# Patient Record
Sex: FEMALE | Race: BLACK OR AFRICAN AMERICAN | Employment: PART TIME | URBAN - METROPOLITAN AREA
[De-identification: names, ages, dates, MRNs, and addresses within clinical notes are randomized per-mention and may not be internally consistent; named-entity substitution may affect disease eponyms.]

---
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
## 2023-01-01 ENCOUNTER — APPOINTMENT (OUTPATIENT)
Dept: CT IMAGING | Facility: CLINIC | Age: 18
End: 2023-01-01
Attending: EMERGENCY MEDICINE
Payer: COMMERCIAL

## 2023-01-01 ENCOUNTER — HOSPITAL ENCOUNTER (EMERGENCY)
Facility: CLINIC | Age: 18
Discharge: HOME | End: 2023-01-01
Attending: EMERGENCY MEDICINE | Admitting: EMERGENCY MEDICINE
Payer: COMMERCIAL

## 2023-01-01 VITALS
TEMPERATURE: 97.8 F | HEART RATE: 68 BPM | DIASTOLIC BLOOD PRESSURE: 58 MMHG | RESPIRATION RATE: 18 BRPM | WEIGHT: 235 LBS | OXYGEN SATURATION: 99 % | BODY MASS INDEX: 34.7 KG/M2 | SYSTOLIC BLOOD PRESSURE: 114 MMHG

## 2023-01-01 DIAGNOSIS — I26.99 ACUTE PULMONARY EMBOLISM WITHOUT ACUTE COR PULMONALE, UNSPECIFIED PULMONARY EMBOLISM TYPE (H): ICD-10-CM

## 2023-01-01 LAB
ANION GAP SERPL CALCULATED.3IONS-SCNC: 11 MMOL/L (ref 7–15)
BASOPHILS # BLD AUTO: 0 10E3/UL (ref 0–0.2)
BASOPHILS NFR BLD AUTO: 0 %
BUN SERPL-MCNC: 11.6 MG/DL (ref 6–20)
CALCIUM SERPL-MCNC: 8.6 MG/DL (ref 8.6–10)
CHLORIDE SERPL-SCNC: 99 MMOL/L (ref 98–107)
CREAT SERPL-MCNC: 0.62 MG/DL (ref 0.51–0.95)
DEPRECATED HCO3 PLAS-SCNC: 26 MMOL/L (ref 22–29)
EOSINOPHIL # BLD AUTO: 0 10E3/UL (ref 0–0.7)
EOSINOPHIL NFR BLD AUTO: 0 %
ERYTHROCYTE [DISTWIDTH] IN BLOOD BY AUTOMATED COUNT: 17.2 % (ref 10–15)
GFR SERPL CREATININE-BSD FRML MDRD: >90 ML/MIN/1.73M2
GLUCOSE SERPL-MCNC: 83 MG/DL (ref 70–99)
HCG SERPL QL: NEGATIVE
HCT VFR BLD AUTO: 33.5 % (ref 35–47)
HGB BLD-MCNC: 9.8 G/DL (ref 11.7–15.7)
HOLD SPECIMEN: NORMAL
IMM GRANULOCYTES # BLD: 0 10E3/UL
IMM GRANULOCYTES NFR BLD: 0 %
LYMPHOCYTES # BLD AUTO: 2.3 10E3/UL (ref 0.8–5.3)
LYMPHOCYTES NFR BLD AUTO: 24 %
MCH RBC QN AUTO: 22.6 PG (ref 26.5–33)
MCHC RBC AUTO-ENTMCNC: 29.3 G/DL (ref 31.5–36.5)
MCV RBC AUTO: 77 FL (ref 78–100)
MONOCYTES # BLD AUTO: 0.6 10E3/UL (ref 0–1.3)
MONOCYTES NFR BLD AUTO: 6 %
NEUTROPHILS # BLD AUTO: 6.6 10E3/UL (ref 1.6–8.3)
NEUTROPHILS NFR BLD AUTO: 70 %
NRBC # BLD AUTO: 0 10E3/UL
NRBC BLD AUTO-RTO: 0 /100
NT-PROBNP SERPL-MCNC: <36 PG/ML (ref 0–450)
PLATELET # BLD AUTO: 422 10E3/UL (ref 150–450)
POTASSIUM SERPL-SCNC: 4.5 MMOL/L (ref 3.4–5.3)
RBC # BLD AUTO: 4.33 10E6/UL (ref 3.8–5.2)
SODIUM SERPL-SCNC: 136 MMOL/L (ref 136–145)
TROPONIN T SERPL HS-MCNC: <6 NG/L
WBC # BLD AUTO: 9.6 10E3/UL (ref 4–11)

## 2023-01-01 PROCEDURE — 96374 THER/PROPH/DIAG INJ IV PUSH: CPT | Mod: 59

## 2023-01-01 PROCEDURE — 99285 EMERGENCY DEPT VISIT HI MDM: CPT | Mod: 25

## 2023-01-01 PROCEDURE — 80048 BASIC METABOLIC PNL TOTAL CA: CPT | Performed by: EMERGENCY MEDICINE

## 2023-01-01 PROCEDURE — 250N000011 HC RX IP 250 OP 636: Performed by: EMERGENCY MEDICINE

## 2023-01-01 PROCEDURE — 36415 COLL VENOUS BLD VENIPUNCTURE: CPT | Performed by: EMERGENCY MEDICINE

## 2023-01-01 PROCEDURE — 250N000009 HC RX 250: Performed by: EMERGENCY MEDICINE

## 2023-01-01 PROCEDURE — 71275 CT ANGIOGRAPHY CHEST: CPT

## 2023-01-01 PROCEDURE — 83880 ASSAY OF NATRIURETIC PEPTIDE: CPT | Performed by: EMERGENCY MEDICINE

## 2023-01-01 PROCEDURE — 93005 ELECTROCARDIOGRAM TRACING: CPT

## 2023-01-01 PROCEDURE — 85025 COMPLETE CBC W/AUTO DIFF WBC: CPT | Performed by: EMERGENCY MEDICINE

## 2023-01-01 PROCEDURE — 84484 ASSAY OF TROPONIN QUANT: CPT | Performed by: EMERGENCY MEDICINE

## 2023-01-01 PROCEDURE — 84703 CHORIONIC GONADOTROPIN ASSAY: CPT | Performed by: EMERGENCY MEDICINE

## 2023-01-01 RX ORDER — IOPAMIDOL 755 MG/ML
120 INJECTION, SOLUTION INTRAVASCULAR ONCE
Status: COMPLETED | OUTPATIENT
Start: 2023-01-01 | End: 2023-01-01

## 2023-01-01 RX ORDER — ONDANSETRON 2 MG/ML
4 INJECTION INTRAMUSCULAR; INTRAVENOUS EVERY 30 MIN PRN
Status: DISCONTINUED | OUTPATIENT
Start: 2023-01-01 | End: 2023-01-01 | Stop reason: HOSPADM

## 2023-01-01 RX ADMIN — SODIUM CHLORIDE 100 ML: 9 INJECTION, SOLUTION INTRAVENOUS at 14:35

## 2023-01-01 RX ADMIN — IOPAMIDOL 85 ML: 755 INJECTION, SOLUTION INTRAVENOUS at 14:34

## 2023-01-01 RX ADMIN — ONDANSETRON HYDROCHLORIDE 4 MG: 2 INJECTION, SOLUTION INTRAMUSCULAR; INTRAVENOUS at 13:32

## 2023-01-01 ASSESSMENT — ENCOUNTER SYMPTOMS
FEVER: 0
SHORTNESS OF BREATH: 1

## 2023-01-01 ASSESSMENT — ACTIVITIES OF DAILY LIVING (ADL)
ADLS_ACUITY_SCORE: 35
ADLS_ACUITY_SCORE: 35
ADLS_ACUITY_SCORE: 33

## 2023-01-01 NOTE — ED NOTES
Rapid Assessment Note    History:   Hector Mazariegos is a 17 year old female who presents with chest pain and shortness of breath following a breast reduction. Three days ago she received a breast reduction at the [facility]. Since then she has been feeling short of breath. Today this shortness of breath worsened and she began to experience chest pain. When she called [facility] about her symptoms earlier today she claimed that [facility] suggested a potential blood clot and directed her to the ED. She explained that she can only take short breaths because full ones are painful. The patient denies fever.     Exam:   General:  Alert, interactive  Cardiovascular:  Well perfused  Lungs:  No respiratory distress, no accessory muscle use  Neuro:  Moving all 4 extremities  Skin:  Warm, dry  Psych:  Normal affect      Plan of Care:   I evaluated the patient and developed an initial plan of care. I discussed this plan and explained that I, or one of my partners, would be returning to complete the evaluation.     Afebrile and hemodynamically stable 17-year-old female status post bilateral breast reduction on December 29 at [facility] in [place].  This was performed as an outpatient.  She has developed shortness of breath and some chest discomfort, contacted her clinic and was directed to the emergency department to rule out pulmonary embolism.  IV, labs, and CT chest PE protocol have been ordered.  Her surgical incisions have not been examined in the intake area.        I, KHRIS CORTES, am serving as a scribe to document services personally performed by Dr. Coombs, based on my observations and the provider's statements to me.    1/1/2023  [employer]    Portions of this medical record were completed by a scribe. UPON MY REVIEW AND AUTHENTICATION BY ELECTRONIC SIGNATURE, this confirms (a) I performed the applicable clinical services, and (b) the record is accurate.        Jg Coombs,  MD  01/01/23 [id number]

## 2023-01-01 NOTE — ED TRIAGE NOTES
Breast reduction on 12/29/2022. Pt reports SOB, chest pain, warmth and worsening pain around incisions that started last night. Also concerned about rash on left shoulder. C/o sharp pain near drainage incisions too. Has been taking oxycodone for pain. Denies fever, N/V/D.

## 2023-01-01 NOTE — ED PROVIDER NOTES
History     Chief Complaint:  Shortness of Breath       HPI   Hector Mazariegos is a 17 year old female s/p breast reduction on 12/29 at [facility] who presents with chest pain and shortness of breath. Three days ago she received a breast reduction at the [facility]. Since then she has been feeling short of breath. Today this shortness of breath worsened and she began to experience chest pain. When she called [facility] about her symptoms earlier today she claimed that [facility] suggested a potential blood clot and directed her to the ED. She explained that she can only take short breaths because full ones are painful. The patient denies fever.     Independent Historian: Yes     Review of External Notes: yes    ROS:  Review of Systems   Constitutional: Negative for fever.   Respiratory: Positive for shortness of breath.    Cardiovascular: Positive for chest pain.   All other systems reviewed and are negative.    Allergies:  No Known Allergies     Medications:    Tylenol  Colace  Motrin  Senokot  Aldactone  Docusate sodium  Roxicodone  Zofran    Past Medical History:    Macromastia  Intoeing  Obesity  Anxiety  Acne    Past Surgical History:    Reduction Breast, Bilateral     Family History:  Mother: Diabetes  Brother: Diabetes      Social History:  The patient presents to the ED with her mother  PCP: No Ref-Primary, Physician     Physical Exam     Patient Vitals for the past 24 hrs:   BP Temp Temp src Pulse Resp SpO2 Weight   01/01/23 1642 114/58 -- -- -- -- 99 % --   01/01/23 1621 124/71 -- -- -- -- 100 % --   01/01/23 1606 -- -- -- -- -- 99 % --   01/01/23 1605 136/61 -- -- 68 -- -- --   01/01/23 1330 104/50 -- -- 67 -- 98 % --   01/01/23 1323 111/45 -- -- -- -- 98 % --   01/01/23 1207 118/72 -- -- -- -- -- --   01/01/23 1206 -- 97.8  F (36.6  C) Temporal 89 18 100 % 106.6 kg (235 lb)        Physical Exam  General: Patient is alert and cooperative.  HENT:  Normal nose, oropharynx. Moist oral mucosa.  Eyes: EOMI. Normal  conjunctiva.  Neck:  Normal range of motion and appearance.   Cardiovascular:  Normal rate, regular rhythm and normal heart sounds.   Pulmonary/Chest:  Effort normal. No wheezing or crackles.  Surgical incisions healing, no sign of infection.   Abdominal: Soft. No distension or tenderness.     Musculoskeletal: Normal range of motion. No edema or tenderness.   Neurological: oriented, normal strength, sensation, and coordination.   Skin: Warm and dry. No rash or bruising.   Psychiatric: Normal mood and affect. Normal behavior and judgement.      Emergency Department Course   ECG:  ECG results from 01/01/23   EKG 12 lead     Value    Systolic Blood Pressure     Diastolic Blood Pressure     Ventricular Rate 70    Atrial Rate 70    NE Interval 144    QRS Duration 80        QTc 432    P Axis 0    R AXIS 46    T Axis 33    Interpretation ECG      Sinus rhythm with sinus arrhythmia  Normal ECG  No previous ECGs available         Imaging:  CT Chest Pulmonary Embolism w Contrast   Final Result   IMPRESSION: The exam is positive for acute appearing pulmonary embolism within several right lower lobe pulmonary artery branches.         Critical Result: Pulmonary embolism      Finding was identified on 1/1/2023 2:54 PM.       Dr. Coombs was contacted by me on 1/1/2023 2:54 PM and verbalized understanding of the critical result.          Report per radiology    Laboratory:  Labs Ordered and Resulted from Time of ED Arrival to Time of ED Departure   CBC WITH PLATELETS AND DIFFERENTIAL - Abnormal       Result Value    WBC Count 9.6      RBC Count 4.33      Hemoglobin 9.8 (*)     Hematocrit 33.5 (*)     MCV 77 (*)     MCH 22.6 (*)     MCHC 29.3 (*)     RDW 17.2 (*)     Platelet Count 422      % Neutrophils 70      % Lymphocytes 24      % Monocytes 6      % Eosinophils 0      % Basophils 0      % Immature Granulocytes 0      NRBCs per 100 WBC 0      Absolute Neutrophils 6.6      Absolute Lymphocytes 2.3      Absolute Monocytes  0.6      Absolute Eosinophils 0.0      Absolute Basophils 0.0      Absolute Immature Granulocytes 0.0      Absolute NRBCs 0.0     BASIC METABOLIC PANEL - Normal    Sodium 136      Potassium 4.5      Chloride 99      Carbon Dioxide (CO2) 26      Anion Gap 11      Urea Nitrogen 11.6      Creatinine 0.62      Calcium 8.6      Glucose 83      GFR Estimate >90     HCG QUALITATIVE PREGNANCY - Normal    hCG Serum Qualitative Negative     TROPONIN T, HIGH SENSITIVITY - Normal    Troponin T, High Sensitivity <6     NT PROBNP INPATIENT - Normal    N terminal Pro BNP Inpatient <36        Emergency Department Course & Assessments:      Interventions:  1332 Zofran 4mg IV    Consultations/Discussion of Management or Tests:  1309 I obtained history and examined the patient.  1454 I spoke with Dr. Shell, radiology, regarding CT results.  1558 I rechecked the patient and explained findings.  1650 I rechecked the patient and spoke with the patient's surgery team, including Dr. Mackey, regarding CT findings.      Disposition:  The patient was discharged to home.     Impression & Plan      Medical Decision Making:  Afebrile, hemodynamically stable 17-year-old female status post breast reduction at [facility] on December 29.  She is presented with complaints of chest discomfort and shortness of breath.  Surgical incisions show no drainage or sign of infection.  Work-ups included a CT chest demonstrating evidence of an acute appearing pulmonary embolism within several right lower lobe pulmonary branches.  Labs are unremarkable.  I did speak with the surgery team at [facility].  From their perspective, they have recommended managing pulmonary embolism with anticoagulation, she will be placed on a course of Eliquis with standard dosing and is advised to follow-up with her clinic in the near future for reevaluation and further management.  She has a normal cardiopulmonary examination including a normal respiratory rate and oxygen  saturations.  Blood pressure and heart rate are also within normal limits.  She is a good candidate for outpatient management with early follow-up.    Diagnosis:    ICD-10-CM    1. Acute pulmonary embolism without acute cor pulmonale, unspecified pulmonary embolism type (H)  I26.99            Discharge Medications:  Discharge Medication List as of 1/1/2023  5:08 PM      START taking these medications    Details   Rivaroxaban ANTICOAGULANT 15 & 20 MG TBPK Starter Therapy Pack Take 15 mg by mouth 2 times daily (with meals) for 21 days, THEN 20 mg daily with food for 9 days., Disp-51 each, R-0, E-Prescribe              Scribe Disclosure:  I, KHRIS CORTES, am serving as a scribe at 3:24 PM on 1/1/2023 to document services personally performed by Jg Coombs MD based on my observations and the provider's statements to me.     I, Cindy Swenson, am serving as a scribe () on 1/1/2023 at 4:22 PM to personally document services performed by Jg Coombs MD based on my observations and the provider's statements to me.      1/1/2023   Jg Coombs MD Isaacson, Brian A, MD  01/23/23 0697

## 2023-01-02 LAB
ATRIAL RATE - MUSE: 70 BPM
DIASTOLIC BLOOD PRESSURE - MUSE: NORMAL MMHG
INTERPRETATION ECG - MUSE: NORMAL
P AXIS - MUSE: 0 DEGREES
PR INTERVAL - MUSE: 144 MS
QRS DURATION - MUSE: 80 MS
QT - MUSE: 400 MS
QTC - MUSE: 432 MS
R AXIS - MUSE: 46 DEGREES
SYSTOLIC BLOOD PRESSURE - MUSE: NORMAL MMHG
T AXIS - MUSE: 33 DEGREES
VENTRICULAR RATE- MUSE: 70 BPM

## 2023-01-02 NOTE — ED NOTES
Call received from pt reporting difficulty filling xarelto prescription d/t insurance.  Spoke with Dr. Joya who gave verbal order to change prescription.  Called [facility] [id number] on [facility], left message to change RX to Eliquis 10 mg BID x 1 week, followed by Eliquis 5 mg BID x 1 week.  Pt called back and instructions relayed to  medication tomorrow AM and call ER back with any issues.

## 2023-01-20 ENCOUNTER — APPOINTMENT (OUTPATIENT)
Dept: URBAN - METROPOLITAN AREA CLINIC 253 | Age: 18
Setting detail: DERMATOLOGY
End: 2023-01-20

## 2023-01-20 VITALS — RESPIRATION RATE: 14 BRPM | WEIGHT: 215 LBS | HEIGHT: 69 IN

## 2023-01-20 DIAGNOSIS — L70.0 ACNE VULGARIS: ICD-10-CM

## 2023-01-20 DIAGNOSIS — L85.3 XEROSIS CUTIS: ICD-10-CM

## 2023-01-20 PROCEDURE — 99213 OFFICE O/P EST LOW 20 MIN: CPT

## 2023-01-20 PROCEDURE — OTHER PRESCRIPTION: OTHER

## 2023-01-20 PROCEDURE — OTHER ADDITIONAL NOTES: OTHER

## 2023-01-20 PROCEDURE — OTHER COUNSELING: OTHER

## 2023-01-20 PROCEDURE — OTHER MIPS QUALITY: OTHER

## 2023-01-20 RX ORDER — SPIRONOLACTONE 50 MG/1
TABLET, FILM COATED ORAL
Qty: 60 | Refills: 1 | Status: ERX | COMMUNITY
Start: 2023-01-20

## 2023-01-20 RX ORDER — CLINDAMYCIN PHOSPHATE 10 MG/ML
1% SOLUTION TOPICAL QAM
Qty: 60 | Refills: 5 | Status: ERX | COMMUNITY
Start: 2023-01-20

## 2023-01-20 RX ORDER — TRETIONIN 1 MG/G
0.1% CREAM TOPICAL QHS
Qty: 45 | Refills: 1 | Status: ERX

## 2023-01-20 ASSESSMENT — LOCATION ZONE DERM: LOCATION ZONE: FACE

## 2023-01-20 ASSESSMENT — LOCATION SIMPLE DESCRIPTION DERM: LOCATION SIMPLE: LEFT CHEEK

## 2023-01-20 ASSESSMENT — LOCATION DETAILED DESCRIPTION DERM: LOCATION DETAILED: LEFT INFERIOR MEDIAL MALAR CHEEK

## 2023-01-20 NOTE — HPI: PIMPLES (ACNE)
What Type Of Note Output Would You Prefer (Optional)?: Standard Output
Is This A New Presentation, Or A Follow-Up?: Follow Up Acne
Additional Comments (Use Complete Sentences): Following up for acne. She has been using the tretinoin and clindamycin for a couple years, it’s gone well but now she’s getting larger ones that stay awhile. She thinks she never tried the Spironolactone as it maybe wasn’t covered by her insurance. She thinks the bad ones are worse when she is stressed, or energy drinks. Not necessarily worse around her period. The larger ones are stubborn.

## 2023-01-20 NOTE — PROCEDURE: ADDITIONAL NOTES
Additional Notes: Call if Spironolactone is expensive again, it should not me\\n\\nReviewed Spironolactone risks and side effects with the patient including but not limited to lightheadedness and dizziness, headaches, breast tenderness, menstrual irregularities and spotting, heart palpitations, muscle fatigue or weakness, and signs of renal or liver toxicity. Told patient to call the clinic and stop taking the medication if she experiences any of these serious side effects. Emphasized she should not become pregnant while taking this medication. All questions were answered regarding this medication.\\n\\nA clinical assistant was present for the exam. Care instructions were explained to the patient in detail. Told patient to call with any concerns or questions. The patient verbalized understanding and agreement of the education provided and the treatment plan. Encouraged patient to schedule a follow up appointment right after visit. At the end of the visit, all questions had been answered and the patient was satisfied with the visit.
Detail Level: Simple
Render Risk Assessment In Note?: no

## 2023-01-20 NOTE — PROCEDURE: COUNSELING
High Dose Vitamin A Counseling: Side effects reviewed, pt to contact office should one occur.
Bactrim Pregnancy And Lactation Text: This medication is Pregnancy Category D and is known to cause fetal risk.  It is also excreted in breast milk.
Winlevi Pregnancy And Lactation Text: This medication is considered safe during pregnancy and breastfeeding.
Detail Level: Generalized
Minocycline Counseling: Patient advised regarding possible photosensitivity and discoloration of the teeth, skin, lips, tongue and gums.  Patient instructed to avoid sunlight, if possible.  When exposed to sunlight, patients should wear protective clothing, sunglasses, and sunscreen.  The patient was instructed to call the office immediately if the following severe adverse effects occur:  hearing changes, easy bruising/bleeding, severe headache, or vision changes.  The patient verbalized understanding of the proper use and possible adverse effects of minocycline.  All of the patient's questions and concerns were addressed.
Topical Clindamycin Counseling: Patient counseled that this medication may cause skin irritation or allergic reactions.  In the event of skin irritation, the patient was advised to reduce the amount of the drug applied or use it less frequently.   The patient verbalized understanding of the proper use and possible adverse effects of clindamycin.  All of the patient's questions and concerns were addressed.
Use Enhanced Medication Counseling?: No
Doxycycline Counseling:  Patient counseled regarding possible photosensitivity and increased risk for sunburn.  Patient instructed to avoid sunlight, if possible.  When exposed to sunlight, patients should wear protective clothing, sunglasses, and sunscreen.  The patient was instructed to call the office immediately if the following severe adverse effects occur:  hearing changes, easy bruising/bleeding, severe headache, or vision changes.  The patient verbalized understanding of the proper use and possible adverse effects of doxycycline.  All of the patient's questions and concerns were addressed.
Winlevi Counseling:  I discussed with the patient the risks of topical clascoterone including but not limited to erythema, scaling, itching, and stinging. Patient voiced their understanding.
Aklief Pregnancy And Lactation Text: It is unknown if this medication is safe to use during pregnancy.  It is unknown if this medication is excreted in breast milk.  Breastfeeding women should use the topical cream on the smallest area of the skin for the shortest time needed while breastfeeding.  Do not apply to nipple and areola.
Birth Control Pills Counseling: Birth Control Pill Counseling: I discussed with the patient the potential side effects of OCPs including but not limited to increased risk of stroke, heart attack, thrombophlebitis, deep venous thrombosis, hepatic adenomas, breast changes, GI upset, headaches, and depression.  The patient verbalized understanding of the proper use and possible adverse effects of OCPs. All of the patient's questions and concerns were addressed.
Patient Specific Counseling (Will Not Stick From Patient To Patient): She states she never started the spironolactone. She states it was to experience. \\nRecommended that she continue with the topical medications.\\nRecommend she try and get the spironolactone again. \\nShe voices liking the clindamycin wipes instead of the lotion.
Detail Level: Zone
Topical Clindamycin Pregnancy And Lactation Text: This medication is Pregnancy Category B and is considered safe during pregnancy. It is unknown if it is excreted in breast milk.
Dapsone Counseling: I discussed with the patient the risks of dapsone including but not limited to hemolytic anemia, agranulocytosis, rashes, methemoglobinemia, kidney failure, peripheral neuropathy, headaches, GI upset, and liver toxicity.  Patients who start dapsone require monitoring including baseline LFTs and weekly CBCs for the first month, then every month thereafter.  The patient verbalized understanding of the proper use and possible adverse effects of dapsone.  All of the patient's questions and concerns were addressed.
Topical Sulfur Applications Pregnancy And Lactation Text: This medication is Pregnancy Category C and has an unknown safety profile during pregnancy. It is unknown if this topical medication is excreted in breast milk.
Tetracycline Pregnancy And Lactation Text: This medication is Pregnancy Category D and not consider safe during pregnancy. It is also excreted in breast milk.
Benzoyl Peroxide Counseling: Patient counseled that medicine may cause skin irritation and bleach clothing.  In the event of skin irritation, the patient was advised to reduce the amount of the drug applied or use it less frequently.   The patient verbalized understanding of the proper use and possible adverse effects of benzoyl peroxide.  All of the patient's questions and concerns were addressed.
Erythromycin Counseling:  I discussed with the patient the risks of erythromycin including but not limited to GI upset, allergic reaction, drug rash, diarrhea, increase in liver enzymes, and yeast infections.
Azelaic Acid Counseling: Patient counseled that medicine may cause skin irritation and to avoid applying near the eyes.  In the event of skin irritation, the patient was advised to reduce the amount of the drug applied or use it less frequently.   The patient verbalized understanding of the proper use and possible adverse effects of azelaic acid.  All of the patient's questions and concerns were addressed.
Topical Retinoid counseling:  Patient advised to apply a pea-sized amount only at bedtime and wait 30 minutes after washing their face before applying.  If too drying, patient may add a non-comedogenic moisturizer. The patient verbalized understanding of the proper use and possible adverse effects of retinoids.  All of the patient's questions and concerns were addressed.
Sarecycline Counseling: Patient advised regarding possible photosensitivity and discoloration of the teeth, skin, lips, tongue and gums.  Patient instructed to avoid sunlight, if possible.  When exposed to sunlight, patients should wear protective clothing, sunglasses, and sunscreen.  The patient was instructed to call the office immediately if the following severe adverse effects occur:  hearing changes, easy bruising/bleeding, severe headache, or vision changes.  The patient verbalized understanding of the proper use and possible adverse effects of sarecycline.  All of the patient's questions and concerns were addressed.
Isotretinoin Counseling: Patient should get monthly blood tests, not donate blood, not drive at night if vision affected, not share medication, and not undergo elective surgery for 6 months after tx completed. Side effects reviewed, pt to contact office should one occur.
Bactrim Counseling:  I discussed with the patient the risks of sulfa antibiotics including but not limited to GI upset, allergic reaction, drug rash, diarrhea, dizziness, photosensitivity, and yeast infections.  Rarely, more serious reactions can occur including but not limited to aplastic anemia, agranulocytosis, methemoglobinemia, blood dyscrasias, liver or kidney failure, lung infiltrates or desquamative/blistering drug rashes.
Isotretinoin Pregnancy And Lactation Text: This medication is Pregnancy Category X and is considered extremely dangerous during pregnancy. It is unknown if it is excreted in breast milk.
Spironolactone Pregnancy And Lactation Text: This medication can cause feminization of the male fetus and should be avoided during pregnancy. The active metabolite is also found in breast milk.
Benzoyl Peroxide Pregnancy And Lactation Text: This medication is Pregnancy Category C. It is unknown if benzoyl peroxide is excreted in breast milk.
Topical Retinoid Pregnancy And Lactation Text: This medication is Pregnancy Category C. It is unknown if this medication is excreted in breast milk.
Erythromycin Pregnancy And Lactation Text: This medication is Pregnancy Category B and is considered safe during pregnancy. It is also excreted in breast milk.
Tetracycline Counseling: Patient counseled regarding possible photosensitivity and increased risk for sunburn.  Patient instructed to avoid sunlight, if possible.  When exposed to sunlight, patients should wear protective clothing, sunglasses, and sunscreen.  The patient was instructed to call the office immediately if the following severe adverse effects occur:  hearing changes, easy bruising/bleeding, severe headache, or vision changes.  The patient verbalized understanding of the proper use and possible adverse effects of tetracycline.  All of the patient's questions and concerns were addressed. Patient understands to avoid pregnancy while on therapy due to potential birth defects.
Tazorac Pregnancy And Lactation Text: This medication is not safe during pregnancy. It is unknown if this medication is excreted in breast milk.
Tazorac Counseling:  Patient advised that medication is irritating and drying.  Patient may need to apply sparingly and wash off after an hour before eventually leaving it on overnight.  The patient verbalized understanding of the proper use and possible adverse effects of tazorac.  All of the patient's questions and concerns were addressed.
High Dose Vitamin A Pregnancy And Lactation Text: High dose vitamin A therapy is contraindicated during pregnancy and breast feeding.
Aklief counseling:  Patient advised to apply a pea-sized amount only at bedtime and wait 30 minutes after washing their face before applying.  If too drying, patient may add a non-comedogenic moisturizer.  The most commonly reported side effects including irritation, redness, scaling, dryness, stinging, burning, itching, and increased risk of sunburn.  The patient verbalized understanding of the proper use and possible adverse effects of retinoids.  All of the patient's questions and concerns were addressed.
Birth Control Pills Pregnancy And Lactation Text: This medication should be avoided if pregnant and for the first 30 days post-partum.
Spironolactone Counseling: Patient advised regarding risks of diarrhea, abdominal pain, hyperkalemia, birth defects (for female patients), liver toxicity and renal toxicity. The patient may need blood work to monitor liver and kidney function and potassium levels while on therapy. The patient verbalized understanding of the proper use and possible adverse effects of spironolactone.  All of the patient's questions and concerns were addressed.
Azithromycin Counseling:  I discussed with the patient the risks of azithromycin including but not limited to GI upset, allergic reaction, drug rash, diarrhea, and yeast infections.
Topical Sulfur Applications Counseling: Topical Sulfur Counseling: Patient counseled that this medication may cause skin irritation or allergic reactions.  In the event of skin irritation, the patient was advised to reduce the amount of the drug applied or use it less frequently.   The patient verbalized understanding of the proper use and possible adverse effects of topical sulfur application.  All of the patient's questions and concerns were addressed.
Azithromycin Pregnancy And Lactation Text: This medication is considered safe during pregnancy and is also secreted in breast milk.
Dapsone Pregnancy And Lactation Text: This medication is Pregnancy Category C and is not considered safe during pregnancy or breast feeding.
Doxycycline Pregnancy And Lactation Text: This medication is Pregnancy Category D and not consider safe during pregnancy. It is also excreted in breast milk but is considered safe for shorter treatment courses.
Patient Specific Counseling (Will Not Stick From Patient To Patient): Declined exam. \\nRecommend OTC moisturizers.
Azelaic Acid Pregnancy And Lactation Text: This medication is considered safe during pregnancy and breast feeding.

## 2023-02-13 ENCOUNTER — OFFICE VISIT (OUTPATIENT)
Dept: URGENT CARE | Facility: URGENT CARE | Age: 18
End: 2023-02-13
Payer: COMMERCIAL

## 2023-02-13 VITALS
DIASTOLIC BLOOD PRESSURE: 67 MMHG | TEMPERATURE: 97.8 F | OXYGEN SATURATION: 100 % | WEIGHT: 245 LBS | BODY MASS INDEX: 36.29 KG/M2 | HEART RATE: 77 BPM | SYSTOLIC BLOOD PRESSURE: 102 MMHG | HEIGHT: 69 IN

## 2023-02-13 DIAGNOSIS — Z86.711 HISTORY OF PULMONARY EMBOLISM: ICD-10-CM

## 2023-02-13 DIAGNOSIS — R06.02 SOB (SHORTNESS OF BREATH): Primary | ICD-10-CM

## 2023-02-13 PROCEDURE — 99207 PR NO BILLABLE SERVICE THIS VISIT: CPT

## 2023-02-13 NOTE — PROGRESS NOTES
SUBJECTIVE:   Hector Mazariegos is a 17 year old female presenting with a chief complaint of   Chief Complaint   Patient presents with     Urgent Care     Shortness of Breath     Pt in clinic to have eval for SOB and sharp right breast pain.  Pt states she is 2 months post op breast reduction.     Breast Problem       She is an established patient of [facility].  Patient is presenting with right breast pain and SOB.  No abdominal pain.  Since yesterday.  Patient has a hx of PE and is off of her blood thinner.          Review of Systems    No past medical history on file.  Family History   Problem Relation Age of Onset     Diabetes Mother      Diabetes Brother      Alzheimer Disease Paternal Grandmother      Current Outpatient Medications   Medication Sig Dispense Refill     acetaminophen 500 MG CAPS Take 2 capsules by mouth every 8 hours as needed For aches, pain, fever (Patient not taking: Reported on 2/13/2023) 60 capsule 0     clindamycin (CLEOCIN T) 1 % external lotion Through Dermatologist (Patient not taking: Reported on 2/13/2023)  1     docusate sodium (COLACE) 100 MG tablet Take 1 tablet (100 mg) by mouth daily (Patient not taking: Reported on 2/13/2023) 20 tablet 0     ibuprofen (ADVIL/MOTRIN) 200 MG tablet Take 1 tablet (200 mg) by mouth every 4 hours as needed for mild pain (Patient not taking: Reported on 2/13/2023) 100 tablet 3     RETIN-A 0.1 % external cream Through Dermatologist (Patient not taking: Reported on 2/13/2023)       Rivaroxaban ANTICOAGULANT 15 & 20 MG TBPK Starter Therapy Pack Take 15 mg by mouth 2 times daily (with meals) for 21 days, THEN 20 mg daily with food for 9 days. 51 each 0     sennosides (SENOKOT) 8.6 MG tablet Take 1 tablet by mouth daily (Patient not taking: Reported on 2/13/2023) 15 tablet 0     spironolactone (ALDACTONE) 100 MG tablet Through Dermatologist (Patient not taking: Reported on 2/13/2023)       vitamin D3 (CHOLECALCIFEROL) 1.25 MG (29806 UT) capsule Take 1 capsule  "(50,000 Units) by mouth once a week (Patient not taking: Reported on 2/13/2023) 8 capsule 0     Social History     Tobacco Use     Smoking status: Never     Smokeless tobacco: Never   Substance Use Topics     Alcohol use: No       OBJECTIVE  /67   Pulse 77   Temp 97.8  F (36.6  C) (Temporal)   Ht 1.753 m (5' 9\")   Wt 111.1 kg (245 lb)   LMP 02/12/2023   SpO2 100%   BMI 36.18 kg/m      Physical Exam    No PE    PLAN:  Although patient has excellent vitals, cannnot R/O PE.  Patient thought she was in the ED. Patient sent to ED for evaluation and treatment.  Patient left in stable condition.    Followup:    If not improving or if condition worsens, follow up with your Primary Care Provider, Transfer to ED via private car    There are no Patient Instructions on file for this visit.      "

## 2023-02-15 ENCOUNTER — OFFICE VISIT (OUTPATIENT)
Dept: PEDIATRICS | Facility: CLINIC | Age: 18
End: 2023-02-15
Payer: COMMERCIAL

## 2023-02-15 ENCOUNTER — OFFICE VISIT (OUTPATIENT)
Dept: FAMILY MEDICINE | Facility: CLINIC | Age: 18
End: 2023-02-15
Payer: COMMERCIAL

## 2023-02-15 VITALS
HEART RATE: 79 BPM | DIASTOLIC BLOOD PRESSURE: 72 MMHG | TEMPERATURE: 98.6 F | BODY MASS INDEX: 35.53 KG/M2 | RESPIRATION RATE: 15 BRPM | WEIGHT: 248.2 LBS | OXYGEN SATURATION: 100 % | HEIGHT: 70 IN | SYSTOLIC BLOOD PRESSURE: 120 MMHG

## 2023-02-15 VITALS
DIASTOLIC BLOOD PRESSURE: 59 MMHG | WEIGHT: 248 LBS | SYSTOLIC BLOOD PRESSURE: 101 MMHG | BODY MASS INDEX: 35.5 KG/M2 | TEMPERATURE: 98.1 F | HEART RATE: 69 BPM | HEIGHT: 70 IN | RESPIRATION RATE: 16 BRPM | OXYGEN SATURATION: 100 %

## 2023-02-15 DIAGNOSIS — I26.99 ACUTE PULMONARY EMBOLISM WITHOUT ACUTE COR PULMONALE, UNSPECIFIED PULMONARY EMBOLISM TYPE (H): Primary | ICD-10-CM

## 2023-02-15 DIAGNOSIS — R06.02 SOB (SHORTNESS OF BREATH): ICD-10-CM

## 2023-02-15 DIAGNOSIS — Z86.711 HISTORY OF PULMONARY EMBOLUS (PE): ICD-10-CM

## 2023-02-15 LAB
BASOPHILS # BLD AUTO: 0 10E3/UL (ref 0–0.2)
BASOPHILS NFR BLD AUTO: 0 %
D DIMER PPP FEU-MCNC: <0.27 UG/ML FEU (ref 0–0.5)
EOSINOPHIL # BLD AUTO: 0 10E3/UL (ref 0–0.7)
EOSINOPHIL NFR BLD AUTO: 0 %
ERYTHROCYTE [DISTWIDTH] IN BLOOD BY AUTOMATED COUNT: 15.9 % (ref 10–15)
HCT VFR BLD AUTO: 34.5 % (ref 35–47)
HGB BLD-MCNC: 9.9 G/DL (ref 11.7–15.7)
IMM GRANULOCYTES # BLD: 0 10E3/UL
IMM GRANULOCYTES NFR BLD: 0 %
LYMPHOCYTES # BLD AUTO: 2.5 10E3/UL (ref 0.8–5.3)
LYMPHOCYTES NFR BLD AUTO: 45 %
MCH RBC QN AUTO: 21.7 PG (ref 26.5–33)
MCHC RBC AUTO-ENTMCNC: 28.7 G/DL (ref 31.5–36.5)
MCV RBC AUTO: 76 FL (ref 78–100)
MONOCYTES # BLD AUTO: 0.2 10E3/UL (ref 0–1.3)
MONOCYTES NFR BLD AUTO: 4 %
NEUTROPHILS # BLD AUTO: 2.8 10E3/UL (ref 1.6–8.3)
NEUTROPHILS NFR BLD AUTO: 51 %
NRBC # BLD AUTO: 0 10E3/UL
NRBC BLD AUTO-RTO: 0 /100
PLATELET # BLD AUTO: 449 10E3/UL (ref 150–450)
RBC # BLD AUTO: 4.56 10E6/UL (ref 3.8–5.2)
WBC # BLD AUTO: 5.5 10E3/UL (ref 4–11)

## 2023-02-15 PROCEDURE — 99213 OFFICE O/P EST LOW 20 MIN: CPT | Performed by: PHYSICIAN ASSISTANT

## 2023-02-15 PROCEDURE — 99207 REFERRAL TO ACUTE AND DIAGNOSTIC SERVICES: CPT | Performed by: PHYSICIAN ASSISTANT

## 2023-02-15 PROCEDURE — 99214 OFFICE O/P EST MOD 30 MIN: CPT | Performed by: NURSE PRACTITIONER

## 2023-02-15 PROCEDURE — 85025 COMPLETE CBC W/AUTO DIFF WBC: CPT | Performed by: NURSE PRACTITIONER

## 2023-02-15 PROCEDURE — 36415 COLL VENOUS BLD VENIPUNCTURE: CPT | Performed by: NURSE PRACTITIONER

## 2023-02-15 PROCEDURE — 85379 FIBRIN DEGRADATION QUANT: CPT | Performed by: NURSE PRACTITIONER

## 2023-02-15 ASSESSMENT — PAIN SCALES - GENERAL
PAINLEVEL: SEVERE PAIN (6)
PAINLEVEL: SEVERE PAIN (7)

## 2023-02-15 NOTE — PATIENT INSTRUCTIONS
Results for orders placed or performed in visit on 02/15/23   D dimer quantitative     Status: Normal   Result Value Ref Range    D-Dimer Quantitative <0.27 0.00 - 0.50 ug/mL FEU    Narrative    This D-dimer assay is intended for use in conjunction with a clinical pretest probability assessment model to exclude pulmonary embolism (PE) and deep venous thrombosis (DVT) in outpatients suspected of PE or DVT. The cut-off value is 0.50 ug/mL FEU.   CBC with platelets and differential     Status: Abnormal   Result Value Ref Range    WBC Count 5.5 4.0 - 11.0 10e3/uL    RBC Count 4.56 3.80 - 5.20 10e6/uL    Hemoglobin 9.9 (L) 11.7 - 15.7 g/dL    Hematocrit 34.5 (L) 35.0 - 47.0 %    MCV 76 (L) 78 - 100 fL    MCH 21.7 (L) 26.5 - 33.0 pg    MCHC 28.7 (L) 31.5 - 36.5 g/dL    RDW 15.9 (H) 10.0 - 15.0 %    Platelet Count 449 150 - 450 10e3/uL    % Neutrophils 51 %    % Lymphocytes 45 %    % Monocytes 4 %    % Eosinophils 0 %    % Basophils 0 %    % Immature Granulocytes 0 %    NRBCs per 100 WBC 0 <1 /100    Absolute Neutrophils 2.8 1.6 - 8.3 10e3/uL    Absolute Lymphocytes 2.5 0.8 - 5.3 10e3/uL    Absolute Monocytes 0.2 0.0 - 1.3 10e3/uL    Absolute Eosinophils 0.0 0.0 - 0.7 10e3/uL    Absolute Basophils 0.0 0.0 - 0.2 10e3/uL    Absolute Immature Granulocytes 0.0 <=0.4 10e3/uL    Absolute NRBCs 0.0 10e3/uL   CBC with platelets differential     Status: Abnormal    Narrative    The following orders were created for panel order CBC with platelets differential.  Procedure                               Abnormality         Status                     ---------                               -----------         ------                     CBC with platelets and d...[[id number]]  Abnormal            Final result                 Please view results for these tests on the individual orders.     The d dimer is negative.  This usually indicates this is not from a blood clot.    We were unable to get an IV in to do the CT scan to confirm this.  I  do recommend you continue on the anticoagulants and follow up with your PCP.

## 2023-02-15 NOTE — PROGRESS NOTES
Assessment & Plan     History of pulmonary embolus (PE)  - Referral to Acute and Diagnostic Services (Day of diagnostic / First order acute)  - D dimer quantitative; Future  - CBC with platelets differential; Future  - IV access  - sodium chloride (PF) 0.9% PF flush 3 mL  - D dimer quantitative  - CBC with platelets differential    SOB (shortness of breath)  - Referral to Acute and Diagnostic Services (Day of diagnostic / First order acute)  - D dimer quantitative; Future  - CBC with platelets differential; Future  - IV access  - sodium chloride (PF) 0.9% PF flush 3 mL  - D dimer quantitative  - CBC with platelets differential    Patient Instructions     Results for orders placed or performed in visit on 02/15/23   D dimer quantitative     Status: Normal   Result Value Ref Range    D-Dimer Quantitative <0.27 0.00 - 0.50 ug/mL FEU    Narrative    This D-dimer assay is intended for use in conjunction with a clinical pretest probability assessment model to exclude pulmonary embolism (PE) and deep venous thrombosis (DVT) in outpatients suspected of PE or DVT. The cut-off value is 0.50 ug/mL FEU.   CBC with platelets and differential     Status: Abnormal   Result Value Ref Range    WBC Count 5.5 4.0 - 11.0 10e3/uL    RBC Count 4.56 3.80 - 5.20 10e6/uL    Hemoglobin 9.9 (L) 11.7 - 15.7 g/dL    Hematocrit 34.5 (L) 35.0 - 47.0 %    MCV 76 (L) 78 - 100 fL    MCH 21.7 (L) 26.5 - 33.0 pg    MCHC 28.7 (L) 31.5 - 36.5 g/dL    RDW 15.9 (H) 10.0 - 15.0 %    Platelet Count 449 150 - 450 10e3/uL    % Neutrophils 51 %    % Lymphocytes 45 %    % Monocytes 4 %    % Eosinophils 0 %    % Basophils 0 %    % Immature Granulocytes 0 %    NRBCs per 100 WBC 0 <1 /100    Absolute Neutrophils 2.8 1.6 - 8.3 10e3/uL    Absolute Lymphocytes 2.5 0.8 - 5.3 10e3/uL    Absolute Monocytes 0.2 0.0 - 1.3 10e3/uL    Absolute Eosinophils 0.0 0.0 - 0.7 10e3/uL    Absolute Basophils 0.0 0.0 - 0.2 10e3/uL    Absolute Immature Granulocytes 0.0 <=0.4 10e3/uL     Absolute NRBCs 0.0 10e3/uL   CBC with platelets differential     Status: Abnormal    Narrative    The following orders were created for panel order CBC with platelets differential.  Procedure                               Abnormality         Status                     ---------                               -----------         ------                     CBC with platelets and d...[[id number]]  Abnormal            Final result                 Please view results for these tests on the individual orders.     The d dimer is negative.  This usually indicates this is not from a blood clot.    We were unable to get an IV in to do the CT scan to confirm this.  I do recommend you continue on the anticoagulants and follow up with your PCP.              Return in about 1 week (around 2/22/2023) for with regular provider if symptoms persist.    UZIEL Damian CNP  [facility]    Lance Young is a 17 year old, presenting  urgently to [facility] by referral from Rah FLORES to rule out recurrent PE for the following health issues:  Breathing Problem (SOB and pain on rt upper chest for 3 days)      HPI     Shortness of Breath/Breathing Problem  Onset/Duration: 3 days  Progression of Symptoms: worsening  Accompanying Signs & Symptoms:  SOB at rest: YES  SOB with activity: YES  Pain with inspiration: YES  Cough: No  Pink tinged sputum: No  Sweating: YES  Nausea/vomiting: No  Lightheadedness: YES  Palpitations: YES  Fever/Chills: No           Heartburn: YES  History:   Family history of coagulation disorders: No  Tobacco use: YES  Previous similar symptoms: no   Precipitating factors:            Related to eating: No           Better with burping: No  Therapies tried and outcome: none    Acute PE post breast reduction 2 months ago, only took anticoagulant for 2-3 weeks then stopped.        Review of Systems   Constitutional, HEENT, cardiovascular, pulmonary, GI, , musculoskeletal,  "neuro, skin, endocrine and psych systems are negative, except as otherwise noted.      Objective    /59   Pulse 69   Temp 98.1  F (36.7  C) (Oral)   Resp 16   Ht 1.765 m (5' 9.5\")   Wt 112.5 kg (248 lb)   LMP 02/12/2023   SpO2 100%   BMI 36.10 kg/m    Body mass index is 36.1 kg/m .  Physical Exam   GENERAL: healthy, alert and no distress  NECK: no adenopathy, no asymmetry, masses, or scars and thyroid normal to palpation  RESP: lungs clear to auscultation - no rales, rhonchi or wheezes  CV: regular rate and rhythm, normal S1 S2, no S3 or S4, no murmur, click or rub, no peripheral edema and peripheral pulses strong  ABDOMEN: soft, nontender, no hepatosplenomegaly, no masses and bowel sounds normal  MS: no gross musculoskeletal defects noted, no edema  SKIN: no suspicious lesions or rashes           "

## 2023-02-15 NOTE — PROGRESS NOTES
"  Assessment & Plan     Acute pulmonary embolism without acute cor pulmonale, unspecified pulmonary embolism type (H)  Discussed with provider at [facility]- will send patient over to get CT scan and follow up.  Consider Heme referral as well.  - Referral to Acute and Diagnostic Services (Day of diagnostic / First order acute); Future    SOB (shortness of breath)    - Referral to Acute and Diagnostic Services (Day of diagnostic / First order acute); Future       MED REC REQUIRED  Post Medication Reconciliation Status:       BMI:   Estimated body mass index is 36.13 kg/m  as calculated from the following:    Height as of this encounter: 1.765 m (5' 9.5\").    Weight as of this encounter: 112.6 kg (248 lb 3.2 oz).   Weight management plan: Discussed healthy diet and exercise guidelines        Return today (on 2/15/2023) for HUB-ADS for work up.    Rah Castillo PA-C  [facility] SHAYE Young is a 17 year old, presenting for the following health issues:  Chest Pain (Follow up pulmonary embolism and continues with some sob and discomfort on the right side of chest that has moved from the outer aspect to the midline. )      HPI     Concern - C/O right sided chest discomfort  Onset: has been since having her breast reduction and pulmonary embolism in January.   Description: discomfort can be very sharp and painful at times  Intensity: moderate  Progression of Symptoms:  same  Accompanying Signs & Symptoms: none  Previous history of similar problem: no  Precipitating factors:        Worsened by: if she tries to lay on her right side it is very painful.   Alleviating factors:        Improved by: uncertain  Therapies tried and outcome:  none     Done with blood thinners- took only for 2-3 weeks she thinks, had some left over but can't find in her house right now  No FH of blood clots  Still having SOB and chest pain  Plastic surg told her to follow up with PCP- had breast reduction two " "months ago  Was in  2/13/23 and was told to go to the ED but she couldn't wait there due to having class  Patient in nursing school        Review of Systems   Constitutional, HEENT, cardiovascular, pulmonary, gi and gu systems are negative, except as otherwise noted.      Objective    /72   Pulse 79   Temp 98.6  F (37  C) (Oral)   Resp 15   Ht 1.765 m (5' 9.5\")   Wt 112.6 kg (248 lb 3.2 oz)   LMP 02/12/2023   SpO2 100%   BMI 36.13 kg/m    Body mass index is 36.13 kg/m .  Physical Exam   GENERAL: healthy, alert and no distress  CV: regular rate and rhythm, normal S1 S2, no S3 or S4, no murmur, click or rub, no peripheral edema and peripheral pulses strong                     "

## 2023-03-21 ENCOUNTER — OFFICE VISIT (OUTPATIENT)
Dept: PEDIATRICS | Facility: CLINIC | Age: 18
End: 2023-03-21
Payer: COMMERCIAL

## 2023-03-21 ENCOUNTER — NURSE TRIAGE (OUTPATIENT)
Dept: NURSING | Facility: CLINIC | Age: 18
End: 2023-03-21

## 2023-03-21 VITALS
TEMPERATURE: 99.4 F | OXYGEN SATURATION: 100 % | SYSTOLIC BLOOD PRESSURE: 108 MMHG | RESPIRATION RATE: 18 BRPM | HEART RATE: 84 BPM | DIASTOLIC BLOOD PRESSURE: 64 MMHG

## 2023-03-21 DIAGNOSIS — L03.116 CELLULITIS OF LEFT LOWER EXTREMITY: Primary | ICD-10-CM

## 2023-03-21 PROCEDURE — 99214 OFFICE O/P EST MOD 30 MIN: CPT | Performed by: PEDIATRICS

## 2023-03-21 RX ORDER — CLINDAMYCIN HCL 300 MG
300 CAPSULE ORAL 4 TIMES DAILY
Qty: 40 CAPSULE | Refills: 0 | Status: SHIPPED | OUTPATIENT
Start: 2023-03-21 | End: 2023-03-31

## 2023-03-21 RX ORDER — MUPIROCIN 20 MG/G
OINTMENT TOPICAL 3 TIMES DAILY
Qty: 30 G | Refills: 0 | Status: SHIPPED | OUTPATIENT
Start: 2023-03-21 | End: 2023-03-28

## 2023-03-21 NOTE — TELEPHONE ENCOUNTER
She has lump on her left leg    Inner thigh more by the rectum - and she noticed it 2 days    It is painful    There is one and it is the size of about a quarter    She also has a URI    The area is red    Per protocol patient should be seen today    Home cares given per protocol.    Wendy Reaves RN  [facility] Nurse Advisor  7:48 AM  3/21/2023        Reason for Disposition    Boil > 1/2 inch across (> 12 mm; larger than a marble) and center is soft or pus colored    Additional Information    Negative: Widespread rash and bright red, sunburn-like and too weak to stand    Negative: Sounds like a life-threatening emergency to the triager    Negative: Widespread red rash    Negative: Black (necrotic) color or blisters develop in wound    Negative: Patient sounds very sick or weak to the triager    Negative: SEVERE pain (e.g., excruciating)    Negative: Red streak from area of infection    Negative: Fever > 100.4 F (38.0 C)    Negative: Boil > 2 inches across (> 5 cm; larger than a golf ball or ping pong ball)    Protocols used: BOIL (SKIN ABSCESS)-A-OH

## 2023-03-21 NOTE — PROGRESS NOTES
Assessment & Plan     Cellulitis of left lower extremity     - clindamycin (CLEOCIN) 300 MG capsule; Take 1 capsule (300 mg) by mouth 4 times daily for 10 days  - mupirocin (BACTROBAN) 2 % external ointment; Apply topically 3 times daily for 7 days    Prescription drug management  30 minutes spent on the date of the encounter doing chart review, history and exam, documentation and further activities per the note        MEDICATIONS:        - Start taking clindamycin  FUTURE APPOINTMENTS:       - Follow-up visit in 1 week    Return in about 2 days (around 3/23/2023) for via VIRTUAL VISIT.    Mary Shabazz MD  [facility]    Subjective   Hector is a 17 year old, presenting for the following health issues:  No chief complaint on file.      History of Present Illness       Reason for visit:  A boil in my inner thigh  Symptom onset:  1-3 days ago    She eats 0-1 servings of fruits and vegetables daily.She consumes 1 sweetened beverage(s) daily.She exercises with enough effort to increase her heart rate 20 to 29 minutes per day.  She exercises with enough effort to increase her heart rate 3 or less days per week.   She is taking medications regularly.       SUBJECTIVE:  Hector Mazariegos is a 17 year old female  who is here because of a infected    The rash was firs noticed on the finger 3 days.    Associated symptoms:     Recent illnesses: none  Sick contacts: none known  ROS:    Review of systems negative for constitutional, HEENT, respiratory, cardiovascular, gastrointestinal, genitourinary, endocrine, neurological, skin, and hematologic issues, other than as above.  Review of systems negative for constitutional, HEENT, respiratory, cardiovascular, gastrointestinal, genitourinary, endocrine, neorological, skin, and hematologic issues, other than as above.        OBJECTIVE:  Resp 15  Ht 5' 4.5 (1.638m)  Wt 132 lbs 3 oz (59.966 kg)    EXAM:   Rash description:     Location: .left inner  prox thigh 5 mm     Lesion type: pustular     Color: red       ASSESSMENT / IMPRESSION:  Cellulitis    PLAN:    Cellulitis of left lower extremity  Warm baths  Soaks bid    Clindamycin  rec ER if febrile increased redness or pain  Aveeno baths  See orders and follow-up plans for this encounter.

## 2023-03-22 ENCOUNTER — HOSPITAL ENCOUNTER (EMERGENCY)
Facility: CLINIC | Age: 18
End: 2023-03-22
Payer: COMMERCIAL

## 2023-04-28 ENCOUNTER — VIRTUAL VISIT (OUTPATIENT)
Dept: PEDIATRICS | Facility: CLINIC | Age: 18
End: 2023-04-28
Payer: COMMERCIAL

## 2023-04-28 DIAGNOSIS — F41.9 ANXIETY: ICD-10-CM

## 2023-04-28 DIAGNOSIS — F32.89 OTHER DEPRESSION: Primary | ICD-10-CM

## 2023-04-28 PROCEDURE — 96127 BRIEF EMOTIONAL/BEHAV ASSMT: CPT | Mod: 95 | Performed by: PEDIATRICS

## 2023-04-28 PROCEDURE — 99443 PR PHYSICIAN TELEPHONE EVALUATION 21-30 MIN: CPT | Mod: 93 | Performed by: PEDIATRICS

## 2023-04-28 RX ORDER — FLUOXETINE 10 MG/1
CAPSULE ORAL
Qty: 49 CAPSULE | Refills: 0 | Status: SHIPPED | OUTPATIENT
Start: 2023-04-28 | End: 2023-11-16

## 2023-04-28 ASSESSMENT — ANXIETY QUESTIONNAIRES
3. WORRYING TOO MUCH ABOUT DIFFERENT THINGS: NEARLY EVERY DAY
GAD7 TOTAL SCORE: 17
5. BEING SO RESTLESS THAT IT IS HARD TO SIT STILL: MORE THAN HALF THE DAYS
GAD7 TOTAL SCORE: 17
4. TROUBLE RELAXING: NEARLY EVERY DAY
7. FEELING AFRAID AS IF SOMETHING AWFUL MIGHT HAPPEN: MORE THAN HALF THE DAYS
7. FEELING AFRAID AS IF SOMETHING AWFUL MIGHT HAPPEN: MORE THAN HALF THE DAYS
8. IF YOU CHECKED OFF ANY PROBLEMS, HOW DIFFICULT HAVE THESE MADE IT FOR YOU TO DO YOUR WORK, TAKE CARE OF THINGS AT HOME, OR GET ALONG WITH OTHER PEOPLE?: EXTREMELY DIFFICULT
6. BECOMING EASILY ANNOYED OR IRRITABLE: MORE THAN HALF THE DAYS
1. FEELING NERVOUS, ANXIOUS, OR ON EDGE: MORE THAN HALF THE DAYS
GAD7 TOTAL SCORE: 17
2. NOT BEING ABLE TO STOP OR CONTROL WORRYING: NEARLY EVERY DAY
IF YOU CHECKED OFF ANY PROBLEMS ON THIS QUESTIONNAIRE, HOW DIFFICULT HAVE THESE PROBLEMS MADE IT FOR YOU TO DO YOUR WORK, TAKE CARE OF THINGS AT HOME, OR GET ALONG WITH OTHER PEOPLE: EXTREMELY DIFFICULT

## 2023-04-28 ASSESSMENT — PATIENT HEALTH QUESTIONNAIRE - PHQ9
SUM OF ALL RESPONSES TO PHQ QUESTIONS 1-9: 19
10. IF YOU CHECKED OFF ANY PROBLEMS, HOW DIFFICULT HAVE THESE PROBLEMS MADE IT FOR YOU TO DO YOUR WORK, TAKE CARE OF THINGS AT HOME, OR GET ALONG WITH OTHER PEOPLE: VERY DIFFICULT
SUM OF ALL RESPONSES TO PHQ QUESTIONS 1-9: 19

## 2023-04-28 ASSESSMENT — ENCOUNTER SYMPTOMS: NERVOUS/ANXIOUS: 1

## 2023-04-28 NOTE — PROGRESS NOTES
Hector is a 17 year old who is being evaluated via a billable telephone visit.      What phone number would you like to be contacted at? [phone]  How would you like to obtain your AVS? Mail a copy    [place] Location (provider location):  On-site    Assessment & Plan     Other depression  Anxiety  Reviewed risk and benefits of medication and elected to start fluoxetine as below  - Adult Mental Health  Referral  - FLUoxetine (PROZAC) 10 MG capsule  Dispense: 49 capsule; Refill: 0  - FLUoxetine (PROZAC) 20 MG capsule  Dispense: 30 capsule; Refill: 0  - PRIMARY CARE FOLLOW-UP SCHEDULING  - [place] BEHAV ASSMT W/SCORE & DOCD/STAND INSTRUMENT      Follow-up in 1 month, sooner if needed     Depression Screening Follow Up        4/28/2023    12:01 PM   PHQ   PHQ-9 Total Score 19   Q9: Thoughts of better off dead/self-harm past 2 weeks Not at all     Kamryn Murphy MD  [facility]    Lance Young is a 17 year old, presenting for the following health issues:  Anxiety         View : No data to display.              Anxiety    History of Present Illness       Mental Health Follow-up:  Patient presents to follow-up on Depression & Anxiety.Patient's depression since last visit has been:  Worse  The patient is having other symptoms associated with depression.  Patient's anxiety since last visit has been:  Worse  The patient is having other symptoms associated with anxiety.  Any significant life events: other  Patient is feeling anxious or having panic attacks.  Patient has no concerns about alcohol or drug use.    She eats 2-3 servings of fruits and vegetables daily.She consumes 1 sweetened beverage(s) daily.She exercises with enough effort to increase her heart rate 30 to 60 minutes per day.  She exercises with enough effort to increase her heart rate 3 or less days per week.   She is taking medications regularly.    Today's PHQ-9         PHQ-9 Total Score: 19    PHQ-9 Q9 Thoughts  of better off dead/self-harm past 2 weeks :   Not at all    How difficult have these problems made it for you to do your work, take care of things at home, or get along with other people: Very difficult  Today's SANJANA-7 Score: 17     ===========================================================  Hector has a history of anxiety.  Since last semester in school, her symptoms have gotten worse.  She used to be excited to go to school and to study nursing, but now finds herself lacking in motivation.  It is difficult for her to pay attention in class, even though the material is interesting.  She has lost interest in other activities and social groups.  Her grades have dropped.  Her appetite seems to be increasing.  She is also isolating herself from her family and friends, and is often feeling tired and sleepy.    There is no known family history of mental health problems, though she acknowledges that this is not something discussed in her family.  No history of trauma.  No known trigger for these worsening symptoms.  No suicidal ideation.    She does not smoke, drink alcohol, or use any other drugs.  She has already reached out to her school for a school-based therapist.      Review of Systems   Psychiatric/Behavioral: The patient is nervous/anxious.       Constitutional, HEENT, cardiovascular, pulmonary, gi and gu systems are negative, except as otherwise noted.      Objective           Vitals:  No vitals were obtained today due to virtual visit.    Physical Exam     PSYCH: Alert and oriented times 3; coherent speech, normal   rate and volume, able to articulate logical thoughts, able   to abstract reason, no tangential thoughts, no hallucinations   or delusions  Her affect is normal  RESP: No cough, no audible wheezing, able to talk in full sentences  Remainder of exam unable to be completed due to telephone visits            Phone call duration: 25 minutes

## 2023-05-16 ENCOUNTER — APPOINTMENT (OUTPATIENT)
Dept: GENERAL RADIOLOGY | Facility: CLINIC | Age: 18
End: 2023-05-16
Attending: EMERGENCY MEDICINE
Payer: COMMERCIAL

## 2023-05-16 ENCOUNTER — HOSPITAL ENCOUNTER (EMERGENCY)
Facility: CLINIC | Age: 18
Discharge: HOME | End: 2023-05-16
Attending: EMERGENCY MEDICINE | Admitting: EMERGENCY MEDICINE
Payer: COMMERCIAL

## 2023-05-16 VITALS
HEIGHT: 69 IN | TEMPERATURE: 98.5 F | WEIGHT: 245.6 LBS | RESPIRATION RATE: 18 BRPM | SYSTOLIC BLOOD PRESSURE: 105 MMHG | BODY MASS INDEX: 36.38 KG/M2 | DIASTOLIC BLOOD PRESSURE: 48 MMHG | OXYGEN SATURATION: 99 % | HEART RATE: 88 BPM

## 2023-05-16 DIAGNOSIS — J02.0 ACUTE STREPTOCOCCAL PHARYNGITIS: ICD-10-CM

## 2023-05-16 LAB
ALBUMIN SERPL BCG-MCNC: 4.1 G/DL (ref 3.5–5.2)
ALBUMIN UR-MCNC: 50 MG/DL
ALP SERPL-CCNC: 86 U/L (ref 35–104)
ALT SERPL W P-5'-P-CCNC: 14 U/L (ref 10–35)
ANION GAP SERPL CALCULATED.3IONS-SCNC: 11 MMOL/L (ref 7–15)
APPEARANCE UR: ABNORMAL
AST SERPL W P-5'-P-CCNC: 16 U/L (ref 10–35)
ATRIAL RATE - MUSE: 65 BPM
BASOPHILS # BLD AUTO: 0 10E3/UL (ref 0–0.2)
BASOPHILS NFR BLD AUTO: 0 %
BILIRUB SERPL-MCNC: 0.3 MG/DL
BILIRUB UR QL STRIP: NEGATIVE
BUN SERPL-MCNC: 8.5 MG/DL (ref 6–20)
CALCIUM SERPL-MCNC: 8.6 MG/DL (ref 8.6–10)
CHLORIDE SERPL-SCNC: 105 MMOL/L (ref 98–107)
COLOR UR AUTO: ABNORMAL
CREAT SERPL-MCNC: 0.63 MG/DL (ref 0.51–0.95)
D DIMER PPP FEU-MCNC: 0.27 UG/ML FEU (ref 0–0.5)
DEPRECATED HCO3 PLAS-SCNC: 23 MMOL/L (ref 22–29)
DEPRECATED S PYO AG THROAT QL EIA: POSITIVE
DIASTOLIC BLOOD PRESSURE - MUSE: NORMAL MMHG
EOSINOPHIL # BLD AUTO: 0 10E3/UL (ref 0–0.7)
EOSINOPHIL NFR BLD AUTO: 0 %
ERYTHROCYTE [DISTWIDTH] IN BLOOD BY AUTOMATED COUNT: 18.9 % (ref 10–15)
FLUAV RNA SPEC QL NAA+PROBE: NEGATIVE
FLUBV RNA RESP QL NAA+PROBE: NEGATIVE
GFR SERPL CREATININE-BSD FRML MDRD: >90 ML/MIN/1.73M2
GLUCOSE SERPL-MCNC: 81 MG/DL (ref 70–99)
GLUCOSE UR STRIP-MCNC: NEGATIVE MG/DL
HCG INTACT+B SERPL-ACNC: <1 MIU/ML
HCT VFR BLD AUTO: 33.4 % (ref 35–47)
HGB BLD-MCNC: 9.7 G/DL (ref 11.7–15.7)
HGB UR QL STRIP: ABNORMAL
IMM GRANULOCYTES # BLD: 0 10E3/UL
IMM GRANULOCYTES NFR BLD: 0 %
INTERPRETATION ECG - MUSE: NORMAL
KETONES UR STRIP-MCNC: NEGATIVE MG/DL
LACTATE SERPL-SCNC: 1 MMOL/L (ref 0.7–2)
LEUKOCYTE ESTERASE UR QL STRIP: ABNORMAL
LYMPHOCYTES # BLD AUTO: 1.6 10E3/UL (ref 0.8–5.3)
LYMPHOCYTES NFR BLD AUTO: 21 %
MCH RBC QN AUTO: 21.6 PG (ref 26.5–33)
MCHC RBC AUTO-ENTMCNC: 29 G/DL (ref 31.5–36.5)
MCV RBC AUTO: 74 FL (ref 78–100)
MONOCYTES # BLD AUTO: 0.4 10E3/UL (ref 0–1.3)
MONOCYTES NFR BLD AUTO: 5 %
MUCOUS THREADS #/AREA URNS LPF: PRESENT /LPF
NEUTROPHILS # BLD AUTO: 5.7 10E3/UL (ref 1.6–8.3)
NEUTROPHILS NFR BLD AUTO: 74 %
NITRATE UR QL: NEGATIVE
NRBC # BLD AUTO: 0 10E3/UL
NRBC BLD AUTO-RTO: 0 /100
NT-PROBNP SERPL-MCNC: 42 PG/ML (ref 0–450)
P AXIS - MUSE: 25 DEGREES
PH UR STRIP: 6 [PH] (ref 5–7)
PLATELET # BLD AUTO: 396 10E3/UL (ref 150–450)
POTASSIUM SERPL-SCNC: 3.7 MMOL/L (ref 3.4–5.3)
PR INTERVAL - MUSE: 146 MS
PROCALCITONIN SERPL IA-MCNC: 0.04 NG/ML
PROT SERPL-MCNC: 6.7 G/DL (ref 6.4–8.3)
QRS DURATION - MUSE: 78 MS
QT - MUSE: 436 MS
QTC - MUSE: 453 MS
R AXIS - MUSE: 33 DEGREES
RBC # BLD AUTO: 4.5 10E6/UL (ref 3.8–5.2)
RBC URINE: >182 /HPF
RSV RNA SPEC NAA+PROBE: NEGATIVE
SARS-COV-2 RNA RESP QL NAA+PROBE: NEGATIVE
SODIUM SERPL-SCNC: 139 MMOL/L (ref 136–145)
SP GR UR STRIP: 1.03 (ref 1–1.03)
SQUAMOUS EPITHELIAL: 3 /HPF
SYSTOLIC BLOOD PRESSURE - MUSE: NORMAL MMHG
T AXIS - MUSE: 18 DEGREES
TROPONIN T SERPL HS-MCNC: <6 NG/L
TSH SERPL DL<=0.005 MIU/L-ACNC: 0.89 UIU/ML (ref 0.5–4.3)
UROBILINOGEN UR STRIP-MCNC: NORMAL MG/DL
VENTRICULAR RATE- MUSE: 65 BPM
WBC # BLD AUTO: 7.8 10E3/UL (ref 4–11)
WBC URINE: 3 /HPF

## 2023-05-16 PROCEDURE — 99285 EMERGENCY DEPT VISIT HI MDM: CPT | Mod: 25 | Performed by: EMERGENCY MEDICINE

## 2023-05-16 PROCEDURE — 84145 PROCALCITONIN (PCT): CPT | Performed by: EMERGENCY MEDICINE

## 2023-05-16 PROCEDURE — 87880 STREP A ASSAY W/OPTIC: CPT | Performed by: EMERGENCY MEDICINE

## 2023-05-16 PROCEDURE — 36415 COLL VENOUS BLD VENIPUNCTURE: CPT | Performed by: EMERGENCY MEDICINE

## 2023-05-16 PROCEDURE — 85379 FIBRIN DEGRADATION QUANT: CPT | Performed by: EMERGENCY MEDICINE

## 2023-05-16 PROCEDURE — 96361 HYDRATE IV INFUSION ADD-ON: CPT | Performed by: EMERGENCY MEDICINE

## 2023-05-16 PROCEDURE — 85004 AUTOMATED DIFF WBC COUNT: CPT | Performed by: EMERGENCY MEDICINE

## 2023-05-16 PROCEDURE — 84132 ASSAY OF SERUM POTASSIUM: CPT | Performed by: EMERGENCY MEDICINE

## 2023-05-16 PROCEDURE — 250N000013 HC RX MED GY IP 250 OP 250 PS 637: Performed by: EMERGENCY MEDICINE

## 2023-05-16 PROCEDURE — 84484 ASSAY OF TROPONIN QUANT: CPT | Performed by: EMERGENCY MEDICINE

## 2023-05-16 PROCEDURE — 84702 CHORIONIC GONADOTROPIN TEST: CPT | Performed by: EMERGENCY MEDICINE

## 2023-05-16 PROCEDURE — 258N000003 HC RX IP 258 OP 636: Performed by: EMERGENCY MEDICINE

## 2023-05-16 PROCEDURE — 83880 ASSAY OF NATRIURETIC PEPTIDE: CPT | Performed by: EMERGENCY MEDICINE

## 2023-05-16 PROCEDURE — 93005 ELECTROCARDIOGRAM TRACING: CPT | Performed by: EMERGENCY MEDICINE

## 2023-05-16 PROCEDURE — 87637 SARSCOV2&INF A&B&RSV AMP PRB: CPT | Performed by: EMERGENCY MEDICINE

## 2023-05-16 PROCEDURE — 84443 ASSAY THYROID STIM HORMONE: CPT | Performed by: EMERGENCY MEDICINE

## 2023-05-16 PROCEDURE — 96360 HYDRATION IV INFUSION INIT: CPT | Performed by: EMERGENCY MEDICINE

## 2023-05-16 PROCEDURE — 81001 URINALYSIS AUTO W/SCOPE: CPT | Performed by: EMERGENCY MEDICINE

## 2023-05-16 PROCEDURE — 83605 ASSAY OF LACTIC ACID: CPT | Performed by: EMERGENCY MEDICINE

## 2023-05-16 PROCEDURE — 71046 X-RAY EXAM CHEST 2 VIEWS: CPT

## 2023-05-16 PROCEDURE — C9803 HOPD COVID-19 SPEC COLLECT: HCPCS | Performed by: EMERGENCY MEDICINE

## 2023-05-16 PROCEDURE — 99284 EMERGENCY DEPT VISIT MOD MDM: CPT | Mod: 25 | Performed by: EMERGENCY MEDICINE

## 2023-05-16 PROCEDURE — 93010 ELECTROCARDIOGRAM REPORT: CPT | Performed by: EMERGENCY MEDICINE

## 2023-05-16 RX ORDER — PENICILLIN V POTASSIUM 500 MG/1
500 TABLET, FILM COATED ORAL ONCE
Status: COMPLETED | OUTPATIENT
Start: 2023-05-16 | End: 2023-05-16

## 2023-05-16 RX ORDER — IBUPROFEN 600 MG/1
600 TABLET, FILM COATED ORAL ONCE
Status: COMPLETED | OUTPATIENT
Start: 2023-05-16 | End: 2023-05-16

## 2023-05-16 RX ORDER — PENICILLIN V POTASSIUM 500 MG/1
500 TABLET, FILM COATED ORAL 3 TIMES DAILY
Qty: 30 TABLET | Refills: 0 | Status: SHIPPED | OUTPATIENT
Start: 2023-05-16 | End: 2023-05-26

## 2023-05-16 RX ADMIN — IBUPROFEN 600 MG: 600 TABLET, FILM COATED ORAL at 13:32

## 2023-05-16 RX ADMIN — PENICILLIN V POTASSIUM 500 MG: 500 TABLET, FILM COATED ORAL at 12:51

## 2023-05-16 RX ADMIN — Medication 1 LOZENGE: at 09:43

## 2023-05-16 RX ADMIN — SODIUM CHLORIDE 1000 ML: 0.9 INJECTION, SOLUTION INTRAVENOUS at 09:53

## 2023-05-16 ASSESSMENT — ACTIVITIES OF DAILY LIVING (ADL)
ADLS_ACUITY_SCORE: 35

## 2023-05-16 NOTE — DISCHARGE INSTRUCTIONS
Take complete course of penicillin.  Take ibuprofen 600 mg every 6 hours with food as needed for pain.  You may also take doses of acetaminophen in between doses of ibuprofen.  Drink plenty of fluids.    Follow-up with your primary care clinic in 1 week if any ongoing symptoms.    Return to the emergency department if worse, difficulty swallowing, or other concerns.

## 2023-05-16 NOTE — ED TRIAGE NOTES
Pharyngitis X 2 days       Cough Dry cough x 2 days           Triage Assessment     Row Name 05/16/23 0801       Triage Assessment (Adult)    Airway WDL WDL       Respiratory WDL    Respiratory WDL WDL       Skin Circulation/Temperature WDL    Skin Circulation/Temperature WDL WDL       Cardiac WDL    Cardiac WDL WDL       Peripheral/Neurovascular WDL    Peripheral Neurovascular WDL WDL       Cognitive/Neuro/Behavioral WDL    Cognitive/Neuro/Behavioral WDL WDL

## 2023-05-16 NOTE — ED PROVIDER NOTES
"ED Provider Note  [facility]      History     Chief Complaint   Patient presents with     Pharyngitis     X 2 days     Cough     Dry cough x 2 days     Dizziness     Feelings of lightheaded since the cough started.     HPI  Hector Mazariegos is a 17 year old female who presents to the emergency department with sore throat and cough.  Patient states that she has felt ill for the past 2 days.  Patient complains of a sore throat and some painful swallowing.  She also reports a cough that has been nonproductive.  She denies any chest pain or dyspnea.  She states that she does feel lightheaded after she has a coughing spell.  She denies any fainting.  Patient denies any chest pain or dyspnea.  No abdominal pain.  No nausea or vomiting.  Patient denies any leg pain or swelling.  She did have a pulmonary embolism after breast reduction surgery in January.  She was treated on outpatient basis.  She received 1 month of treatment out of the emergency department and then never followed up.    Past Medical History  History reviewed. No pertinent past medical history.  History reviewed. No pertinent surgical history.  [START ON 5/28/2023] FLUoxetine (PROZAC) 20 MG capsule  penicillin V (VEETID) 500 MG tablet  FLUoxetine (PROZAC) 10 MG capsule      No Known Allergies  Family History  Family History   Problem Relation Age of Onset     Diabetes Mother      Diabetes Brother      Alzheimer Disease Paternal Grandmother      Social History   Social History     Tobacco Use     Smoking status: Never     Smokeless tobacco: Never   Substance Use Topics     Alcohol use: No     Drug use: No         A medically appropriate review of systems was performed with pertinent positives and negatives noted in the HPI, and all other systems negative.    Physical Exam   BP: (!) 87/65 (second check)  Pulse: 84  Temp: 98.5  F (36.9  C)  Resp: 18  Height: 175.3 cm (5' 9\")  Weight: 111.4 kg (245 lb 9.6 oz)  SpO2: 99 %  Physical " Exam  Vitals and nursing note reviewed.   Constitutional:       General: She is not in acute distress.     Appearance: She is well-developed. She is not diaphoretic.   HENT:      Head: Normocephalic and atraumatic.      Mouth/Throat:      Pharynx: Posterior oropharyngeal erythema present. No oropharyngeal exudate.   Eyes:      General: No scleral icterus.     Pupils: Pupils are equal, round, and reactive to light.   Cardiovascular:      Rate and Rhythm: Normal rate and regular rhythm.      Heart sounds: Normal heart sounds.   Pulmonary:      Effort: No respiratory distress.      Breath sounds: Normal breath sounds.   Abdominal:      General: Bowel sounds are normal.      Palpations: Abdomen is soft.      Tenderness: There is no abdominal tenderness.   Musculoskeletal:         General: No tenderness.      Cervical back: Normal range of motion and neck supple.   Skin:     General: Skin is warm.      Findings: No rash.   Neurological:      General: No focal deficit present.      Mental Status: She is alert.      Motor: Motor function is intact.           ED Course, Procedures, & Data      Procedures       ED Course Selections:        EKG Interpretation:      Interpreted by NAMRATA HEATON MD, MD  Time reviewed: 0817  Symptoms at time of EKG: lightheaded   Rhythm: normal sinus with sinus arrhythmia  Rate:65  Axis: normal  Ectopy: none  Conduction: normal  ST Segments/ T Waves: No ST-T wave changes  Q Waves: none  Comparison to prior: No old EKG available    Clinical Impression: normal EKG    Results for orders placed or performed during the hospital encounter of 05/16/23   XR Chest 2 Views     Status: None    Narrative    CHEST TWO VIEWS   5/16/2023 10:30 AM     HISTORY: Cough.    COMPARISON: 10/1/2022.      Impression    IMPRESSION: No acute cardiopulmonary disease.    AUDIE BARAJAS MD         SYSTEM ID:  [id number]   Symptomatic Influenza A/B, RSV, & SARS-CoV2 PCR (COVID-19) Nose     Status: Normal    Specimen: Nose; Swab    Result Value Ref Range    Influenza A PCR Negative Negative    Influenza B PCR Negative Negative    RSV PCR Negative Negative    SARS CoV2 PCR Negative Negative    Narrative    Testing was performed using the Xpert Xpress CoV2/Flu/RSV Assay on the Cepheid GeneXpert Instrument. This test should be ordered for the detection of SARS-CoV-2, influenza, and RSV viruses in individuals who meet clinical and/or epidemiological criteria. Test performance is unknown in asymptomatic patients. This test is for in vitro diagnostic use under the FDA EUA for laboratories certified under CLIA to perform high or moderate complexity testing. This test has not been FDA cleared or approved. A negative result does not rule out the presence of PCR inhibitors in the specimen or target RNA in concentration below the limit of detection for the assay. If only one viral target is positive but coinfection with multiple targets is suspected, the sample should be re-tested with another FDA cleared, approved, or authorized test, if coinfection would change clinical management. This test was validated by the [facility] [employer]. These laboratories are certified under the Clinical Laboratory Improvement Amendments of 1988 (CLIA-88) as qualified to perform high complexity laboratory testing.   Comprehensive metabolic panel     Status: Normal   Result Value Ref Range    Sodium 139 136 - 145 mmol/L    Potassium 3.7 3.4 - 5.3 mmol/L    Chloride 105 98 - 107 mmol/L    Carbon Dioxide (CO2) 23 22 - 29 mmol/L    Anion Gap 11 7 - 15 mmol/L    Urea Nitrogen 8.5 6.0 - 20.0 mg/dL    Creatinine 0.63 0.51 - 0.95 mg/dL    Calcium 8.6 8.6 - 10.0 mg/dL    Glucose 81 70 - 99 mg/dL    Alkaline Phosphatase 86 35 - 104 U/L    AST 16 10 - 35 U/L    ALT 14 10 - 35 U/L    Protein Total 6.7 6.4 - 8.3 g/dL    Albumin 4.1 3.5 - 5.2 g/dL    Bilirubin Total 0.3 <=1.2 mg/dL    GFR Estimate >90 >60 mL/min/1.73m2   Lactic acid whole blood     Status: Normal   Result  Value Ref Range    Lactic Acid 1.0 0.7 - 2.0 mmol/L   HCG quantitative pregnancy     Status: Normal   Result Value Ref Range    hCG Quantitative <1 <5 mIU/mL   UA with Microscopic reflex to Culture     Status: Abnormal    Specimen: Urine, Clean Catch   Result Value Ref Range    Color Urine Orange (A) Colorless, Straw, Light Yellow, Yellow    Appearance Urine Slightly Cloudy (A) Clear    Glucose Urine Negative Negative mg/dL    Bilirubin Urine Negative Negative    Ketones Urine Negative Negative mg/dL    Specific Gravity Urine 1.032 1.003 - 1.035    Blood Urine Large (A) Negative    pH Urine 6.0 5.0 - 7.0    Protein Albumin Urine 50 (A) Negative mg/dL    Urobilinogen Urine Normal Normal, 2.0 mg/dL    Nitrite Urine Negative Negative    Leukocyte Esterase Urine Trace (A) Negative    Mucus Urine Present (A) None Seen /LPF    RBC Urine >182 (H) <=2 /HPF    WBC Urine 3 <=5 /HPF    Squamous Epithelials Urine 3 (H) <=1 /HPF    Narrative    Urine Culture not indicated   TSH with free T4 reflex     Status: Normal   Result Value Ref Range    TSH 0.89 0.50 - 4.30 uIU/mL   Nt probnp inpatient (BNP)     Status: Normal   Result Value Ref Range    N terminal Pro BNP Inpatient 42 0 - 450 pg/mL   Troponin T, High Sensitivity     Status: Normal   Result Value Ref Range    Troponin T, High Sensitivity <6 <=14 ng/L   D dimer quantitative     Status: Normal   Result Value Ref Range    D-Dimer Quantitative 0.27 0.00 - 0.50 ug/mL FEU    Narrative    This D-dimer assay is intended for use in conjunction with a clinical pretest probability assessment model to exclude pulmonary embolism (PE) and deep venous thrombosis (DVT) in outpatients suspected of PE or DVT. The cut-off value is 0.50 ug/mL FEU.   Procalcitonin     Status: Normal   Result Value Ref Range    Procalcitonin 0.04 <0.05 ng/mL   CBC with platelets and differential     Status: Abnormal   Result Value Ref Range    WBC Count 7.8 4.0 - 11.0 10e3/uL    RBC Count 4.50 3.80 - 5.20  10e6/uL    Hemoglobin 9.7 (L) 11.7 - 15.7 g/dL    Hematocrit 33.4 (L) 35.0 - 47.0 %    MCV 74 (L) 78 - 100 fL    MCH 21.6 (L) 26.5 - 33.0 pg    MCHC 29.0 (L) 31.5 - 36.5 g/dL    RDW 18.9 (H) 10.0 - 15.0 %    Platelet Count 396 150 - 450 10e3/uL    % Neutrophils 74 %    % Lymphocytes 21 %    % Monocytes 5 %    % Eosinophils 0 %    % Basophils 0 %    % Immature Granulocytes 0 %    NRBCs per 100 WBC 0 <1 /100    Absolute Neutrophils 5.7 1.6 - 8.3 10e3/uL    Absolute Lymphocytes 1.6 0.8 - 5.3 10e3/uL    Absolute Monocytes 0.4 0.0 - 1.3 10e3/uL    Absolute Eosinophils 0.0 0.0 - 0.7 10e3/uL    Absolute Basophils 0.0 0.0 - 0.2 10e3/uL    Absolute Immature Granulocytes 0.0 <=0.4 10e3/uL    Absolute NRBCs 0.0 10e3/uL   EKG 12 lead     Status: None   Result Value Ref Range    Systolic Blood Pressure  mmHg    Diastolic Blood Pressure  mmHg    Ventricular Rate 65 BPM    Atrial Rate 65 BPM    AZ Interval 146 ms    QRS Duration 78 ms     ms    QTc 453 ms    P Axis 25 degrees    R AXIS 33 degrees    T Axis 18 degrees    Interpretation ECG       Sinus rhythm with marked sinus arrhythmia  Otherwise normal ECG    Unconfirmed report - interpretation of this ECG is computer generated - see medical record for final interpretation  Confirmed by - EMERGENCY ROOM, PHYSICIAN ([id number]),  TAHMINA FITZPATRICK ([id number]) on 5/16/2023 10:23:21 AM     Streptococcus A Rapid Screen w/Reflex to PCR     Status: Abnormal    Specimen: Throat; Swab   Result Value Ref Range    Group A Strep antigen Positive (A) Negative   CBC with platelets differential     Status: Abnormal    Narrative    The following orders were created for panel order CBC with platelets differential.  Procedure                               Abnormality         Status                     ---------                               -----------         ------                     CBC with platelets and d...[[id number]]  Abnormal            Final result                 Please view results  for these tests on the individual orders.                     Results for orders placed or performed during the hospital encounter of 05/16/23   XR Chest 2 Views     Status: None    Narrative    CHEST TWO VIEWS   5/16/2023 10:30 AM     HISTORY: Cough.    COMPARISON: 10/1/2022.      Impression    IMPRESSION: No acute cardiopulmonary disease.    AUDIE BARAJAS MD         SYSTEM ID:  [id number]   Symptomatic Influenza A/B, RSV, & SARS-CoV2 PCR (COVID-19) Nose     Status: Normal    Specimen: Nose; Swab   Result Value Ref Range    Influenza A PCR Negative Negative    Influenza B PCR Negative Negative    RSV PCR Negative Negative    SARS CoV2 PCR Negative Negative    Narrative    Testing was performed using the Xpert Xpress CoV2/Flu/RSV Assay on the Cepheid GeneXpert Instrument. This test should be ordered for the detection of SARS-CoV-2, influenza, and RSV viruses in individuals who meet clinical and/or epidemiological criteria. Test performance is unknown in asymptomatic patients. This test is for in vitro diagnostic use under the FDA EUA for laboratories certified under CLIA to perform high or moderate complexity testing. This test has not been FDA cleared or approved. A negative result does not rule out the presence of PCR inhibitors in the specimen or target RNA in concentration below the limit of detection for the assay. If only one viral target is positive but coinfection with multiple targets is suspected, the sample should be re-tested with another FDA cleared, approved, or authorized test, if coinfection would change clinical management. This test was validated by the [facility] [employer]. These laboratories are certified under the Clinical Laboratory Improvement Amendments of 1988 (CLIA-88) as qualified to perform high complexity laboratory testing.   Comprehensive metabolic panel     Status: Normal   Result Value Ref Range    Sodium 139 136 - 145 mmol/L    Potassium 3.7 3.4 - 5.3 mmol/L    Chloride 105 98  - 107 mmol/L    Carbon Dioxide (CO2) 23 22 - 29 mmol/L    Anion Gap 11 7 - 15 mmol/L    Urea Nitrogen 8.5 6.0 - 20.0 mg/dL    Creatinine 0.63 0.51 - 0.95 mg/dL    Calcium 8.6 8.6 - 10.0 mg/dL    Glucose 81 70 - 99 mg/dL    Alkaline Phosphatase 86 35 - 104 U/L    AST 16 10 - 35 U/L    ALT 14 10 - 35 U/L    Protein Total 6.7 6.4 - 8.3 g/dL    Albumin 4.1 3.5 - 5.2 g/dL    Bilirubin Total 0.3 <=1.2 mg/dL    GFR Estimate >90 >60 mL/min/1.73m2   Lactic acid whole blood     Status: Normal   Result Value Ref Range    Lactic Acid 1.0 0.7 - 2.0 mmol/L   HCG quantitative pregnancy     Status: Normal   Result Value Ref Range    hCG Quantitative <1 <5 mIU/mL   UA with Microscopic reflex to Culture     Status: Abnormal    Specimen: Urine, Clean Catch   Result Value Ref Range    Color Urine Orange (A) Colorless, Straw, Light Yellow, Yellow    Appearance Urine Slightly Cloudy (A) Clear    Glucose Urine Negative Negative mg/dL    Bilirubin Urine Negative Negative    Ketones Urine Negative Negative mg/dL    Specific Gravity Urine 1.032 1.003 - 1.035    Blood Urine Large (A) Negative    pH Urine 6.0 5.0 - 7.0    Protein Albumin Urine 50 (A) Negative mg/dL    Urobilinogen Urine Normal Normal, 2.0 mg/dL    Nitrite Urine Negative Negative    Leukocyte Esterase Urine Trace (A) Negative    Mucus Urine Present (A) None Seen /LPF    RBC Urine >182 (H) <=2 /HPF    WBC Urine 3 <=5 /HPF    Squamous Epithelials Urine 3 (H) <=1 /HPF    Narrative    Urine Culture not indicated   TSH with free T4 reflex     Status: Normal   Result Value Ref Range    TSH 0.89 0.50 - 4.30 uIU/mL   Nt probnp inpatient (BNP)     Status: Normal   Result Value Ref Range    N terminal Pro BNP Inpatient 42 0 - 450 pg/mL   Troponin T, High Sensitivity     Status: Normal   Result Value Ref Range    Troponin T, High Sensitivity <6 <=14 ng/L   D dimer quantitative     Status: Normal   Result Value Ref Range    D-Dimer Quantitative 0.27 0.00 - 0.50 ug/mL FEU    Narrative     This D-dimer assay is intended for use in conjunction with a clinical pretest probability assessment model to exclude pulmonary embolism (PE) and deep venous thrombosis (DVT) in outpatients suspected of PE or DVT. The cut-off value is 0.50 ug/mL FEU.   Procalcitonin     Status: Normal   Result Value Ref Range    Procalcitonin 0.04 <0.05 ng/mL   CBC with platelets and differential     Status: Abnormal   Result Value Ref Range    WBC Count 7.8 4.0 - 11.0 10e3/uL    RBC Count 4.50 3.80 - 5.20 10e6/uL    Hemoglobin 9.7 (L) 11.7 - 15.7 g/dL    Hematocrit 33.4 (L) 35.0 - 47.0 %    MCV 74 (L) 78 - 100 fL    MCH 21.6 (L) 26.5 - 33.0 pg    MCHC 29.0 (L) 31.5 - 36.5 g/dL    RDW 18.9 (H) 10.0 - 15.0 %    Platelet Count 396 150 - 450 10e3/uL    % Neutrophils 74 %    % Lymphocytes 21 %    % Monocytes 5 %    % Eosinophils 0 %    % Basophils 0 %    % Immature Granulocytes 0 %    NRBCs per 100 WBC 0 <1 /100    Absolute Neutrophils 5.7 1.6 - 8.3 10e3/uL    Absolute Lymphocytes 1.6 0.8 - 5.3 10e3/uL    Absolute Monocytes 0.4 0.0 - 1.3 10e3/uL    Absolute Eosinophils 0.0 0.0 - 0.7 10e3/uL    Absolute Basophils 0.0 0.0 - 0.2 10e3/uL    Absolute Immature Granulocytes 0.0 <=0.4 10e3/uL    Absolute NRBCs 0.0 10e3/uL   EKG 12 lead     Status: None   Result Value Ref Range    Systolic Blood Pressure  mmHg    Diastolic Blood Pressure  mmHg    Ventricular Rate 65 BPM    Atrial Rate 65 BPM    OH Interval 146 ms    QRS Duration 78 ms     ms    QTc 453 ms    P Axis 25 degrees    R AXIS 33 degrees    T Axis 18 degrees    Interpretation ECG       Sinus rhythm with marked sinus arrhythmia  Otherwise normal ECG    Unconfirmed report - interpretation of this ECG is computer generated - see medical record for final interpretation  Confirmed by - EMERGENCY ROOM, PHYSICIAN ([id number]),  TAHMINA FITZPATRICK ([id number]) on 5/16/2023 10:23:21 AM     Streptococcus A Rapid Screen w/Reflex to PCR     Status: Abnormal    Specimen: Throat; Swab   Result Value  Ref Range    Group A Strep antigen Positive (A) Negative   CBC with platelets differential     Status: Abnormal    Narrative    The following orders were created for panel order CBC with platelets differential.  Procedure                               Abnormality         Status                     ---------                               -----------         ------                     CBC with platelets and d...[[id number]]  Abnormal            Final result                 Please view results for these tests on the individual orders.     Medications   0.9% sodium chloride BOLUS (0 mLs Intravenous Stopped 5/16/23 1132)   benzocaine-menthol (CHLORASEPTIC) 6-10 MG lozenge 1 lozenge (1 lozenge Buccal $Given 5/16/23 0935)   penicillin V (VEETID) tablet 500 mg (500 mg Oral $Given 5/16/23 1251)   ibuprofen (ADVIL/MOTRIN) tablet 600 mg (600 mg Oral $Given 5/16/23 1332)     Labs Ordered and Resulted from Time of ED Arrival to Time of ED Departure   ROUTINE UA WITH MICROSCOPIC REFLEX TO CULTURE - Abnormal       Result Value    Color Urine Orange (*)     Appearance Urine Slightly Cloudy (*)     Glucose Urine Negative      Bilirubin Urine Negative      Ketones Urine Negative      Specific Gravity Urine 1.032      Blood Urine Large (*)     pH Urine 6.0      Protein Albumin Urine 50 (*)     Urobilinogen Urine Normal      Nitrite Urine Negative      Leukocyte Esterase Urine Trace (*)     Mucus Urine Present (*)     RBC Urine >182 (*)     WBC Urine 3      Squamous Epithelials Urine 3 (*)    CBC WITH PLATELETS AND DIFFERENTIAL - Abnormal    WBC Count 7.8      RBC Count 4.50      Hemoglobin 9.7 (*)     Hematocrit 33.4 (*)     MCV 74 (*)     MCH 21.6 (*)     MCHC 29.0 (*)     RDW 18.9 (*)     Platelet Count 396      % Neutrophils 74      % Lymphocytes 21      % Monocytes 5      % Eosinophils 0      % Basophils 0      % Immature Granulocytes 0      NRBCs per 100 WBC 0      Absolute Neutrophils 5.7      Absolute Lymphocytes 1.6       Absolute Monocytes 0.4      Absolute Eosinophils 0.0      Absolute Basophils 0.0      Absolute Immature Granulocytes 0.0      Absolute NRBCs 0.0     STREPTOCOCCUS A RAPID SCREEN W REFELX TO PCR - Abnormal    Group A Strep antigen Positive (*)    INFLUENZA A/B, RSV, & SARS-COV2 PCR - Normal    Influenza A PCR Negative      Influenza B PCR Negative      RSV PCR Negative      SARS CoV2 PCR Negative     COMPREHENSIVE METABOLIC PANEL - Normal    Sodium 139      Potassium 3.7      Chloride 105      Carbon Dioxide (CO2) 23      Anion Gap 11      Urea Nitrogen 8.5      Creatinine 0.63      Calcium 8.6      Glucose 81      Alkaline Phosphatase 86      AST 16      ALT 14      Protein Total 6.7      Albumin 4.1      Bilirubin Total 0.3      GFR Estimate >90     LACTIC ACID WHOLE BLOOD - Normal    Lactic Acid 1.0     HCG QUANTITATIVE PREGNANCY - Normal    hCG Quantitative <1     TSH WITH FREE T4 REFLEX - Normal    TSH 0.89     NT PROBNP INPATIENT - Normal    N terminal Pro BNP Inpatient 42     TROPONIN T, HIGH SENSITIVITY - Normal    Troponin T, High Sensitivity <6     D DIMER QUANTITATIVE - Normal    D-Dimer Quantitative 0.27     PROCALCITONIN - Normal    Procalcitonin 0.04       XR Chest 2 Views   Final Result   IMPRESSION: No acute cardiopulmonary disease.      AUDIE BARAJAS MD            SYSTEM ID:  [id number]             Critical care was not performed.     Medical Decision Making  The patient's presentation was of moderate complexity (an undiagnosed new problem with uncertain diagnosis).    The patient's evaluation involved:  ordering and/or review of 3+ test(s) in this encounter (see separate area of note for details)    The patient's management necessitated moderate risk (prescription drug management including medications given in the ED).      Assessment & Plan    17 year old female to emergency department with 2-day history of sore throat, cough, and posttussive lightheadedness.  Differential diagnosis includes strep  pharyngitis, COVID-19, influenza, pneumonia, ACS, pneumothorax, pulmonary embolism, sepsis, anemia, electrolyte disturbance.  The patient's EKG does not reveal any acute ischemic change and troponin is normal so do not suspect ACS after days of symptoms.  The patient's D-dimer is negative so do not suspect pulmonary embolism in a patient that is not tachycardic nor hypoxic.  Chest radiograph does not reveal any infiltrates do not suspect pneumonia.  COVID and influenza testing negative.  The patient's blood pressure was low on arrival but white count, lactate level, and procalcitonin levels all normal and blood pressure normalized with 1 L of normal saline so do not suspect sepsis.  Suspect dehydration likely cause of low blood pressure.  Patient strep screen positive.  Suspect strep pharyngitis as cause for her symptoms.  Initiated penicillin in the emergency department.  Will discharge home on a 10-day course.  Patient appears clinically well after IV fluid resuscitation and appropriate for outpatient management.    I have reviewed the nursing notes. I have reviewed the findings, diagnosis, plan and need for follow up with the patient.    Discharge Medication List as of 5/16/2023  1:08 PM      START taking these medications    Details   penicillin V (VEETID) 500 MG tablet Take 1 tablet (500 mg) by mouth 3 times daily for 10 days, Disp-30 tablet, R-0, E-Prescribe             Final diagnoses:   Acute streptococcal pharyngitis     Chart documentation was completed with Dragon voice-recognition software. Even though reviewed, this chart may still contain some grammatical, spelling, and word errors.     Panfilo Loera Md  [facility] EMERGENCY DEPARTMENT  5/16/2023     Panfilo Loera MD  05/17/23 0821

## 2023-05-16 NOTE — LETTER
May 16, 2023      To Whom It May Concern:      Hector Mazariegos was seen in our Emergency Department today, 05/16/23.  I expect her condition to improve over the next 2 days.  She may return to work/school when improved.    Sincerely,        NAMRATA HEATON MD, MD

## 2023-08-25 ENCOUNTER — RX ONLY (RX ONLY)
Age: 18
End: 2023-08-25

## 2023-08-25 RX ORDER — CLINDAMYCIN PHOSPHATE 10 MG/ML
SOLUTION TOPICAL
Qty: 60 | Refills: 5 | Status: ERX | COMMUNITY
Start: 2023-08-25

## 2023-08-25 RX ORDER — TRETIONIN 1 MG/G
CREAM TOPICAL
Qty: 45 | Refills: 2 | Status: ERX | COMMUNITY
Start: 2023-08-25

## 2023-11-06 ENCOUNTER — HOSPITAL ENCOUNTER (EMERGENCY)
Facility: CLINIC | Age: 18
Discharge: HOME | End: 2023-11-06
Attending: EMERGENCY MEDICINE | Admitting: EMERGENCY MEDICINE
Payer: COMMERCIAL

## 2023-11-06 ENCOUNTER — APPOINTMENT (OUTPATIENT)
Dept: GENERAL RADIOLOGY | Facility: CLINIC | Age: 18
End: 2023-11-06
Attending: EMERGENCY MEDICINE
Payer: COMMERCIAL

## 2023-11-06 VITALS
RESPIRATION RATE: 14 BRPM | TEMPERATURE: 98.6 F | BODY MASS INDEX: 42.09 KG/M2 | OXYGEN SATURATION: 98 % | HEART RATE: 86 BPM | WEIGHT: 284.2 LBS | SYSTOLIC BLOOD PRESSURE: 104 MMHG | DIASTOLIC BLOOD PRESSURE: 70 MMHG | HEIGHT: 69 IN

## 2023-11-06 DIAGNOSIS — B33.8 RSV (RESPIRATORY SYNCYTIAL VIRUS INFECTION): ICD-10-CM

## 2023-11-06 LAB
FLUAV RNA SPEC QL NAA+PROBE: NEGATIVE
FLUBV RNA RESP QL NAA+PROBE: NEGATIVE
GROUP A STREP BY PCR: NOT DETECTED
HCG UR QL: NEGATIVE
INTERNAL QC OK POCT: NORMAL
POCT KIT EXPIRATION DATE: NORMAL
POCT KIT LOT NUMBER: NORMAL
RSV RNA SPEC NAA+PROBE: POSITIVE
SARS-COV-2 RNA RESP QL NAA+PROBE: NEGATIVE

## 2023-11-06 PROCEDURE — 87651 STREP A DNA AMP PROBE: CPT | Performed by: EMERGENCY MEDICINE

## 2023-11-06 PROCEDURE — 71046 X-RAY EXAM CHEST 2 VIEWS: CPT

## 2023-11-06 PROCEDURE — 99284 EMERGENCY DEPT VISIT MOD MDM: CPT | Mod: 25 | Performed by: EMERGENCY MEDICINE

## 2023-11-06 PROCEDURE — 99284 EMERGENCY DEPT VISIT MOD MDM: CPT | Performed by: EMERGENCY MEDICINE

## 2023-11-06 PROCEDURE — 81025 URINE PREGNANCY TEST: CPT | Performed by: EMERGENCY MEDICINE

## 2023-11-06 PROCEDURE — 87637 SARSCOV2&INF A&B&RSV AMP PRB: CPT | Performed by: EMERGENCY MEDICINE

## 2023-11-06 RX ORDER — PSEUDOEPHEDRINE HCL 120 MG/1
120 TABLET, FILM COATED, EXTENDED RELEASE ORAL EVERY 12 HOURS
Qty: 14 TABLET | Refills: 0 | Status: SHIPPED | OUTPATIENT
Start: 2023-11-06 | End: 2023-11-13

## 2023-11-06 RX ORDER — BENZONATATE 100 MG/1
100 CAPSULE ORAL 3 TIMES DAILY PRN
Qty: 30 CAPSULE | Refills: 0 | Status: SHIPPED | OUTPATIENT
Start: 2023-11-06 | End: 2023-11-16

## 2023-11-06 NOTE — DISCHARGE INSTRUCTIONS
Take augmentin for sinus infection.     Take sudafed to help clear up your nose congestion.      You can take tessalon perles for cough.     Stay well hydrated and get plenty of rest.     School note given.

## 2023-11-06 NOTE — Clinical Note
Yair was seen and treated in our emergency department on 11/6/2023.  She may return to school on 11/07/2023.      If you have any questions or concerns, please don't hesitate to call.      Dalia Isidro MD

## 2023-11-06 NOTE — Clinical Note
Pt calm after medication administration. Pt verbalizes understanding as to why restraints were placed in the first place. Pt making more logical statements and not fighting against restraints. Decision made to remove restraints.    Yair was seen and treated in our emergency department on 11/6/2023.  She may return to school on 11/07/2023.      If you have any questions or concerns, please don't hesitate to call.      Dalia Isidro MD

## 2023-11-06 NOTE — ED TRIAGE NOTES
PT has been having cough and discomfort for approx 1 month.  Since last week, increased coughing but unable to get rid of it on her own. Pt states she has bilateral ear pain with it worse on he left.  Pt states she coughed up blood a few nights ago.  7/10 general pain, 8-9/10 ear pain.

## 2023-11-06 NOTE — ED PROVIDER NOTES
"ED Provider Note  [facility]      History     Chief Complaint   Patient presents with    Cough    Otalgia     HPI  Hector Mazariegos is a 17 year old female with a history of acute PEs not currently on anticoagulants presents to the ED for cough, bilateral ear pain.  She says she has been sick the past month but does think it gets better and then returns again.  She does wonder if it is different illness.  She has a dry cough and a lot of nasal congestion.  She says she did cough up a little bit of blood in the morning.  The nasal congestion has been there the whole month.      Past Medical History  History reviewed. No pertinent past medical history.  History reviewed. No pertinent surgical history.  amoxicillin-clavulanate (AUGMENTIN) 875-125 MG tablet  benzonatate (TESSALON) 100 MG capsule  FLUoxetine (PROZAC) 20 MG capsule  pseudoePHEDrine (SUDAFED 12 HOUR) 120 MG 12 hr tablet  FLUoxetine (PROZAC) 10 MG capsule      No Known Allergies  Family History  Family History   Problem Relation Age of Onset    Diabetes Mother     Diabetes Brother     Alzheimer Disease Paternal Grandmother      Social History   Social History     Tobacco Use    Smoking status: Never    Smokeless tobacco: Never   Substance Use Topics    Alcohol use: No    Drug use: No         A medically appropriate review of systems was performed with pertinent positives and negatives noted in the HPI, and all other systems negative.    Physical Exam   BP: 104/70  Pulse: 86  Temp: 98.6  F (37  C)  Resp: 14  Height: 175.3 cm (5' 9\")  Weight: 128.9 kg (284 lb 3.2 oz)  SpO2: 98 %  Physical Exam  Vitals and nursing note reviewed.   Constitutional:       General: She is not in acute distress.     Appearance: She is normal weight. She is not ill-appearing, toxic-appearing or diaphoretic.   HENT:      Head: Normocephalic and atraumatic.      Right Ear: Ear canal normal. Tympanic membrane is erythematous. Right ear perforated TM: dull in " appearance.     Left Ear: Tympanic membrane and ear canal normal.      Nose: Congestion present.      Mouth/Throat:      Mouth: Mucous membranes are moist.   Eyes:      General: No scleral icterus.        Right eye: No discharge.         Left eye: No discharge.      Extraocular Movements: Extraocular movements intact.   Cardiovascular:      Rate and Rhythm: Normal rate and regular rhythm.      Heart sounds: Normal heart sounds.   Pulmonary:      Effort: Pulmonary effort is normal.      Breath sounds: Normal breath sounds.      Comments: Seems congested in her chest but no wheezing  Abdominal:      Palpations: Abdomen is soft.   Musculoskeletal:         General: Normal range of motion.      Cervical back: Normal range of motion and neck supple. No tenderness.   Lymphadenopathy:      Cervical: No cervical adenopathy.   Skin:     General: Skin is warm and dry.      Findings: No rash.   Neurological:      General: No focal deficit present.      Mental Status: She is alert and oriented to person, place, and time.   Psychiatric:         Mood and Affect: Mood normal.           ED Course, Procedures, & Data      Procedures                     Results for orders placed or performed during the hospital encounter of 11/06/23   XR Chest 2 Views     Status: None    Narrative    CHEST TWO VIEWS 11/6/2023 11:51 AM     HISTORY: cough for 1 month    COMPARISON: 5/16/2023.       Impression    IMPRESSION: Cardiac silhouette within normal limits. No focal airspace  consolidation. No pleural effusion or pneumothorax.    PANTERA MONTES MD         SYSTEM ID:  [id number]   Symptomatic Influenza A/B, RSV, & SARS-CoV2 PCR (COVID-19) Nasopharyngeal     Status: Abnormal    Specimen: Nasopharyngeal; Swab   Result Value Ref Range    Influenza A PCR Negative Negative    Influenza B PCR Negative Negative    RSV PCR Positive (A) Negative    SARS CoV2 PCR Negative Negative    Narrative    Testing was performed using the Xpert Xpress CoV2/Flu/RSV  Assay on the [employer] Instrument. This test should be ordered for the detection of SARS-CoV-2, influenza, and RSV viruses in individuals who meet clinical and/or epidemiological criteria. Test performance is unknown in asymptomatic patients. This test is for in vitro diagnostic use under the FDA EUA for laboratories certified under CLIA to perform high or moderate complexity testing. This test has not been FDA cleared or approved. A negative result does not rule out the presence of PCR inhibitors in the specimen or target RNA in concentration below the limit of detection for the assay. If only one viral target is positive but coinfection with multiple targets is suspected, the sample should be re-tested with another FDA cleared, approved, or authorized test, if coinfection would change clinical management. This test was validated by the [facility]. These laboratories are certified under the Clinical Laboratory Improvement Amendments of 1988 (CLIA-88) as qualified to perform high complexity laboratory testing.   hCG qual urine POCT     Status: Normal   Result Value Ref Range    HCG Qual Urine Negative Negative    Internal QC Check POCT Valid Valid    POCT Kit Lot Number [id number]     POCT Kit Expiration Date 05/29/2025    Group A Streptococcus PCR Throat Swab     Status: Normal    Specimen: Throat; Swab   Result Value Ref Range    Group A strep by PCR Not Detected Not Detected    Narrative    The Xpert Xpress Strep A test, performed on the [employer] Systems, is a rapid, qualitative in vitro diagnostic test for the detection of Streptococcus pyogenes (Group A ß-hemolytic Streptococcus, Strep A) in throat swab specimens from patients with signs and symptoms of pharyngitis. The Xpert Xpress Strep A test can be used as an aid in the diagnosis of Group A Streptococcal pharyngitis. The assay is not intended to monitor treatment for Group A Streptococcus infections. The Xpert Xpress  Strep A test utilizes an automated real-time polymerase chain reaction (PCR) to detect Streptococcus pyogenes DNA.     Medications - No data to display  Labs Ordered and Resulted from Time of ED Arrival to Time of ED Departure   INFLUENZA A/B, RSV, & SARS-COV2 PCR - Abnormal       Result Value    Influenza A PCR Negative      Influenza B PCR Negative      RSV PCR Positive (*)     SARS CoV2 PCR Negative     HCG QUALITATIVE URINE POCT - Normal    HCG Qual Urine Negative      Internal QC Check POCT Valid      POCT Kit Lot Number [id number]      POCT Kit Expiration Date 05/29/2025     GROUP A STREPTOCOCCUS PCR THROAT SWAB - Normal    Group A strep by PCR Not Detected       XR Chest 2 Views   Final Result   IMPRESSION: Cardiac silhouette within normal limits. No focal airspace   consolidation. No pleural effusion or pneumothorax.      PANTERA MONTES MD            SYSTEM ID:  [id number]             Critical care was not performed.     Medical Decision Making  The patient's presentation was of moderate complexity (an acute illness with systemic symptoms).    The patient's evaluation involved:  ordering and/or review of 3+ test(s) in this encounter (see separate area of note for details)  independent interpretation of testing performed by another health professional (cxr)    The patient's management necessitated moderate risk (prescription drug management including medications given in the ED).    Assessment & Plan    Hector Mazariegos is a 17 year old female with a history of acute PEs not currently on anticoagulants presents to the ED for cough, ear pain and nasal congestion.  She has had the symptoms for a month but then believes it has been coming and going.  No hx of allergies like this.  She has a lot of sinus congestion which does sound to have been continuous for the month.  She has a red dull right TM and sounds congested when coughing.  Covid, flu and rapid strep checked and negative,  rsv is positive.  Cxr shows no  infiltrate.  I will treat with augmentin for ear and sinus concerns.  I will also prescribe tessalon perles and sudafed. I did not give an inhaler given no wheezing and sats 98%. Pcp follow up if needed.     I have reviewed the nursing notes. I have reviewed the findings, diagnosis, plan and need for follow up with the patient.    Discharge Medication List as of 11/6/2023 12:53 PM        START taking these medications    Details   amoxicillin-clavulanate (AUGMENTIN) 875-125 MG tablet Take 1 tablet by mouth 2 times daily for 7 days, Disp-14 tablet, R-0, E-Prescribe      benzonatate (TESSALON) 100 MG capsule Take 1 capsule (100 mg) by mouth 3 times daily as needed for cough, Disp-30 capsule, R-0, E-Prescribe      pseudoePHEDrine (SUDAFED 12 HOUR) 120 MG 12 hr tablet Take 1 tablet (120 mg) by mouth every 12 hours for 7 days, Disp-14 tablet, R-0, E-Prescribe             Final diagnoses:   RSV (respiratory syncytial virus infection)   I, Rey Rivas, am serving as a trained medical scribe to document services personally performed by Dalia Isidro MD, based to the provider's statements to me.     I, Dalia Isidro MD, was physically present and have reviewed and verified the accuracy of this note documented by Rey Rivas.      Dalia Isidro MD  [facility] EMERGENCY DEPARTMENT  11/6/2023     Dalia Isidro MD  11/08/23 0911

## 2023-11-16 ENCOUNTER — VIRTUAL VISIT (OUTPATIENT)
Dept: INTERNAL MEDICINE | Facility: CLINIC | Age: 18
End: 2023-11-16
Payer: COMMERCIAL

## 2023-11-16 DIAGNOSIS — R41.840 CONCENTRATION DEFICIT: ICD-10-CM

## 2023-11-16 DIAGNOSIS — F33.1 MODERATE EPISODE OF RECURRENT MAJOR DEPRESSIVE DISORDER (H): Primary | ICD-10-CM

## 2023-11-16 DIAGNOSIS — F41.9 ANXIETY: ICD-10-CM

## 2023-11-16 PROCEDURE — 99442 PR PHYSICIAN TELEPHONE EVALUATION 11-20 MIN: CPT | Mod: 95 | Performed by: NURSE PRACTITIONER

## 2023-11-16 RX ORDER — CLINDAMYCIN PHOSPHATE 10 MG/ML
SOLUTION TOPICAL
COMMUNITY
Start: 2023-01-20

## 2023-11-16 RX ORDER — ESCITALOPRAM OXALATE 10 MG/1
10 TABLET ORAL DAILY
Qty: 30 TABLET | Refills: 1 | Status: SHIPPED | OUTPATIENT
Start: 2023-11-16 | End: 2024-09-11

## 2023-11-16 ASSESSMENT — PATIENT HEALTH QUESTIONNAIRE - PHQ9
SUM OF ALL RESPONSES TO PHQ QUESTIONS 1-9: 19
5. POOR APPETITE OR OVEREATING: NEARLY EVERY DAY

## 2023-11-16 ASSESSMENT — ANXIETY QUESTIONNAIRES
GAD7 TOTAL SCORE: 17
IF YOU CHECKED OFF ANY PROBLEMS ON THIS QUESTIONNAIRE, HOW DIFFICULT HAVE THESE PROBLEMS MADE IT FOR YOU TO DO YOUR WORK, TAKE CARE OF THINGS AT HOME, OR GET ALONG WITH OTHER PEOPLE: EXTREMELY DIFFICULT
5. BEING SO RESTLESS THAT IT IS HARD TO SIT STILL: NEARLY EVERY DAY
7. FEELING AFRAID AS IF SOMETHING AWFUL MIGHT HAPPEN: MORE THAN HALF THE DAYS
2. NOT BEING ABLE TO STOP OR CONTROL WORRYING: NEARLY EVERY DAY
6. BECOMING EASILY ANNOYED OR IRRITABLE: NEARLY EVERY DAY
1. FEELING NERVOUS, ANXIOUS, OR ON EDGE: MORE THAN HALF THE DAYS
GAD7 TOTAL SCORE: 17
3. WORRYING TOO MUCH ABOUT DIFFERENT THINGS: SEVERAL DAYS

## 2023-11-16 NOTE — PATIENT INSTRUCTIONS
Mental health referral for   ADHD   Genetic testing     Lexapro 10 mg daily     Follow up in a month

## 2023-11-16 NOTE — PROGRESS NOTES
"Hector is a 17 year old who is being evaluated via a billable telephone visit.      What phone number would you like to be contacted at? In chart   How would you like to obtain your AVS? Keyla    Distant Location (provider location):  On-site    Assessment & Plan     Moderate episode of recurrent major depressive disorder (H)  Prozac not working   Will try lexapro    Want to see someone for medication management and genetic testing - her mother has a version of bipolar and she would like to see a mental health provider    Want to do counseling - referral for counselor     Anxiety  Will try lexapro   - escitalopram (LEXAPRO) 10 MG tablet; Take 1 tablet (10 mg) by mouth daily  - Adult Mental Health  Referral; Future  - Adult Mental Health  Referral; Future    Concentration deficit  Want to do testing for ADHD   - escitalopram (LEXAPRO) 10 MG tablet; Take 1 tablet (10 mg) by mouth daily  - Adult Mental Health  Referral; Future  - Adult Mental Health  Referral; Future      18 minutes spent by me on the date of the encounter doing chart review, history and exam, documentation and further activities per the note       BMI:   Estimated body mass index is 41.97 kg/m  as calculated from the following:    Height as of 11/6/23: 1.753 m (5' 9\").    Weight as of 11/6/23: 128.9 kg (284 lb 3.2 oz).       Depression Screening Follow Up        11/16/2023     1:00 PM   PHQ   PHQ-9 Total Score 19   Q9: Thoughts of better off dead/self-harm past 2 weeks Not at all           Patient Instructions   Mental health referral for   ADHD   Genetic testing     Lexapro 10 mg daily     Follow up in a month     UZIEL Sheldon CNP  [facility]    Subjective   Hector is a 17 year old, presenting for the following health issues:          HPI     Discuss new medication for anxiety / depression   Prescribed prozac in May and made her feel worse   Felt tired   She is in college " "  She did [employer] in high school   Now in college feels she is not \"there\" - feels she is not paying attention   Instead of socializing goes home and naps   Slacking on home work   Worried about failing   Easily irritable   Anxiety increased on medication          4/28/2023    12:01 PM 11/16/2023     1:00 PM   PHQ   PHQ-9 Total Score 19 19   Q9: Thoughts of better off dead/self-harm past 2 weeks Not at all Not at all         1/3/2022     7:53 AM 4/28/2023    12:01 PM 11/16/2023     1:00 PM   SANJANA-7 SCORE   Total Score  17 (severe anxiety)    Total Score 8 17 17               Review of Systems   Constitutional, HEENT, cardiovascular, pulmonary, GI, , musculoskeletal, neuro, skin, endocrine and psych systems are negative, except as otherwise noted.      Objective           Vitals:  No vitals were obtained today due to virtual visit.    Physical Exam   alert and no distress  PSYCH: Alert and oriented times 3; coherent speech, normal   rate and volume, able to articulate logical thoughts, able   to abstract reason, no tangential thoughts, no hallucinations   or delusions  Her affect is normal  RESP: No cough, no audible wheezing, able to talk in full sentences  Remainder of exam unable to be completed due to telephone visits                Phone call duration: 18 minutes      "

## 2023-11-28 ENCOUNTER — APPOINTMENT (OUTPATIENT)
Dept: GENERAL RADIOLOGY | Facility: CLINIC | Age: 18
End: 2023-11-28
Attending: EMERGENCY MEDICINE
Payer: COMMERCIAL

## 2023-11-28 ENCOUNTER — HOSPITAL ENCOUNTER (EMERGENCY)
Facility: CLINIC | Age: 18
Discharge: HOME | End: 2023-11-28
Attending: EMERGENCY MEDICINE | Admitting: EMERGENCY MEDICINE
Payer: COMMERCIAL

## 2023-11-28 VITALS
HEIGHT: 69 IN | BODY MASS INDEX: 41.97 KG/M2 | RESPIRATION RATE: 16 BRPM | DIASTOLIC BLOOD PRESSURE: 72 MMHG | TEMPERATURE: 97.9 F | OXYGEN SATURATION: 100 % | SYSTOLIC BLOOD PRESSURE: 100 MMHG | HEART RATE: 78 BPM

## 2023-11-28 DIAGNOSIS — S93.402A SPRAIN OF LEFT ANKLE, UNSPECIFIED LIGAMENT, INITIAL ENCOUNTER: ICD-10-CM

## 2023-11-28 PROCEDURE — 250N000013 HC RX MED GY IP 250 OP 250 PS 637: Performed by: EMERGENCY MEDICINE

## 2023-11-28 PROCEDURE — 99284 EMERGENCY DEPT VISIT MOD MDM: CPT | Mod: 25

## 2023-11-28 PROCEDURE — 73562 X-RAY EXAM OF KNEE 3: CPT | Mod: RT

## 2023-11-28 PROCEDURE — 73610 X-RAY EXAM OF ANKLE: CPT | Mod: LT

## 2023-11-28 PROCEDURE — 99284 EMERGENCY DEPT VISIT MOD MDM: CPT | Performed by: EMERGENCY MEDICINE

## 2023-11-28 PROCEDURE — 73630 X-RAY EXAM OF FOOT: CPT | Mod: LT

## 2023-11-28 RX ORDER — ACETAMINOPHEN 500 MG
1000 TABLET ORAL 3 TIMES DAILY
Qty: 42 TABLET | Refills: 0 | Status: SHIPPED | OUTPATIENT
Start: 2023-11-28 | End: 2023-12-05

## 2023-11-28 RX ORDER — IBUPROFEN 200 MG
400 TABLET ORAL 3 TIMES DAILY
Qty: 42 TABLET | Refills: 0 | Status: SHIPPED | OUTPATIENT
Start: 2023-11-28 | End: 2024-06-04

## 2023-11-28 RX ORDER — ACETAMINOPHEN 500 MG
1000 TABLET ORAL ONCE
Status: COMPLETED | OUTPATIENT
Start: 2023-11-28 | End: 2023-11-28

## 2023-11-28 RX ORDER — IBUPROFEN 800 MG/1
800 TABLET, FILM COATED ORAL ONCE
Status: COMPLETED | OUTPATIENT
Start: 2023-11-28 | End: 2023-11-28

## 2023-11-28 RX ADMIN — ACETAMINOPHEN 1000 MG: 500 TABLET ORAL at 20:14

## 2023-11-28 RX ADMIN — IBUPROFEN 800 MG: 800 TABLET ORAL at 20:14

## 2023-11-28 ASSESSMENT — ACTIVITIES OF DAILY LIVING (ADL): ADLS_ACUITY_SCORE: 35

## 2023-11-29 ENCOUNTER — TELEPHONE (OUTPATIENT)
Dept: ORTHOPEDICS | Facility: CLINIC | Age: 18
End: 2023-11-29
Payer: COMMERCIAL

## 2023-11-29 NOTE — ED TRIAGE NOTES
Triage Assessment (Adult)       Row Name 11/28/23 1952          Triage Assessment    Airway WDL WDL        Respiratory WDL    Respiratory WDL WDL

## 2023-11-29 NOTE — DISCHARGE INSTRUCTIONS
Clinic will call you within 1 to 2 business days in order to set up a follow-up appointment with them.

## 2023-11-29 NOTE — ED PROVIDER NOTES
"      [facility] EMERGENCY DEPARTMENT ([facility])    11/28/23      ED PROVIDER NOTE\    History     Chief Complaint   Patient presents with    Ankle Pain    Foot Pain     Left foot and ankle pain after fall.     HPI  Hector Mazariegos is a 17 year old female who presents for evaluation of left foot and ankle pain following a fall. Patient reports that she fell on cement and caught herself with her arms. She felt her ankle hit against the cement. She now describes severe left ankle pain. Patient notes that the pain is localized in her left ankle and the sides of her left foot. Patient did not take medications prior to arrival. She denies loss of consciousness or upper body trauma with the fall. No head trauma. Patient has some pain at her right knee, but states that the pain is from a superficial scrape. She also describes increased swelling at her ankle. She states that she is unable to move or bear weight on her ankle without significant pain.     Past Medical History  History reviewed. No pertinent past medical history.  History reviewed. No pertinent surgical history.  clindamycin (CLEOCIN T) 1 % SWAB  escitalopram (LEXAPRO) 10 MG tablet      No Known Allergies  Family History  Family History   Problem Relation Age of Onset    Diabetes Mother     Diabetes Brother     Alzheimer Disease Paternal Grandmother      Social History   Social History     Tobacco Use    Smoking status: Never    Smokeless tobacco: Never   Vaping Use    Vaping Use: Never used   Substance Use Topics    Alcohol use: No    Drug use: No      Past medical history, past surgical history, medications, allergies, family history, and social history were reviewed with the patient. No additional pertinent items.          Physical Exam     BP: 100/72  Pulse: 78  Temp: 97.9  F (36.6  C)  Resp: 16  Height: 175.3 cm (5' 9\")  SpO2: 100 %    Physical Exam  Vitals and nursing note reviewed.   Constitutional:       General: She is not in acute distress.   "   Appearance: Normal appearance. She is not diaphoretic.   HENT:      Head: Atraumatic.      Mouth/Throat:      Mouth: Mucous membranes are moist.   Eyes:      General: No scleral icterus.     Conjunctiva/sclera: Conjunctivae normal.   Cardiovascular:      Rate and Rhythm: Normal rate.      Heart sounds: Normal heart sounds.   Pulmonary:      Effort: No respiratory distress.      Breath sounds: Normal breath sounds.   Abdominal:      General: Abdomen is flat.   Musculoskeletal:      Cervical back: Neck supple.   Skin:     General: Skin is warm.      Findings: No rash.   Neurological:      Mental Status: She is alert.           ED Course, Procedures, & Data        Procedures          No results found for any visits on 11/28/23.  Medications   acetaminophen (TYLENOL) tablet 1,000 mg (has no administration in time range)   ibuprofen (ADVIL/MOTRIN) tablet 800 mg (has no administration in time range)     Labs Ordered and Resulted from Time of ED Arrival to Time of ED Departure - No data to display  XR Ankle Left G/E 3 Views    (Results Pending)   Foot XR, G/E 3 views, left    (Results Pending)          Critical care was not performed.     Medical Decision Making  The patient's presentation was of moderate complexity (an acute complicated injury).    The patient's evaluation involved:  ordering and/or review of 3+ test(s) in this encounter (see separate area of note for details)    The patient's management necessitated only low risk treatment.    Assessment & Plan      17 year old female who presents for evaluation of left foot and ankle pain following a fall.  Vital signs stable and afebrile including normal pulse ox at 100% on room air.  Patient sent to x-ray for imaging of the left foot and left ankle and right knee, all these images revealed no evidence of acute traumatic injury.  Patient fitted with cam boot immobilizer and crutches and given acetaminophen ibuprofen here in the emergency department upon repeat  assessment patient reports adequate pain relief after medication.  Plan to follow-up with orthopedic clinic for further evaluation and care.    I have reviewed the nursing notes. I have reviewed the findings, diagnosis, plan and need for follow up with the patient.    New Prescriptions    No medications on file       Final diagnoses:   Sprain of left ankle, unspecified ligament, initial encounter     I, Naina Stephens, am serving as a trained medical scribe to document services personally performed by Danette Edwrads MD based on the provider's statements to me on November 28, 2023.  This document has been checked and approved by the attending provider.    I, Danette Edwards MD, was physically present and have reviewed and verified the accuracy of this note documented by Naina Stephens, medical scribe.      Danette Edwards MD  [facility] EMERGENCY DEPARTMENT  11/28/2023     Danette Edwards MD  11/29/23 0025

## 2023-12-01 ENCOUNTER — TELEPHONE (OUTPATIENT)
Dept: ORTHOPEDICS | Facility: CLINIC | Age: 18
End: 2023-12-01
Payer: COMMERCIAL

## 2023-12-02 ENCOUNTER — HEALTH MAINTENANCE LETTER (OUTPATIENT)
Age: 18
End: 2023-12-02

## 2023-12-10 NOTE — TELEPHONE ENCOUNTER
DIAGNOSIS: (L) ankle sprain     APPOINTMENT DATE: 12.14.23   NOTES STATUS DETAILS   DISCHARGE REPORT from the ER Internal 11.28.23  [place]  [facility]   MEDICATION LIST Internal    XRAYS (IMAGES & REPORTS) Internal 11.28.23  XR Foot Left  XR Ankle Left

## 2023-12-14 ENCOUNTER — PRE VISIT (OUTPATIENT)
Dept: ORTHOPEDICS | Facility: CLINIC | Age: 18
End: 2023-12-14

## 2024-01-23 ENCOUNTER — TELEPHONE (OUTPATIENT)
Dept: PSYCHOLOGY | Facility: CLINIC | Age: 19
End: 2024-01-23
Payer: COMMERCIAL

## 2024-01-23 NOTE — TELEPHONE ENCOUNTER
Patient was scheduled for 10 am new patient video therapy session.  Patient did not respond to video invite to cell and email.  Attempted 2x to reach patient on phone for session.  Left patient vmail regarding missed session, let patient know there was a follow up appointment scheduled next week but that the process is for that appointment to be cancelled if patient does not show for intial new pt appointment.  Recommended patient call as soon as possible if they desired to try to keep that appointment.  Provided contact information for scheduling and this writer.

## 2024-02-28 NOTE — PROGRESS NOTES
Subjective     Hector Mazariegos is a 14 year old female who presents to clinic today for the following health issues:    HPI         Concern - painful period   Onset: couple days ago (occurs for 9 days a month- 2 days prior to the menstrual period and the 7 day period)  Description: pain comes two days before periods., cramping (radites into the back)  Intensity: 8/10  Progression of Symptoms:  worsening  Accompanying Signs & Symptoms: nausea, headaches   Previous history of similar problem: yes, since year ago, keep's  getting worst  Precipitating factors:        Worsened by: worst when lying down   Alleviating factors:        Improved by: heating pads, ibuprofen, Tylenol , Naproxen     First started menstrual cycle at age 10 or 11. She notes the cramping is getting worse.    She notes she has severe migraines usually occur a couple times a year. The pain usually is located on one side of the head. She has nausea and sensitivity to light. She notes note she has a family history of migraines.      Review of Systems   GENERAL:  No fevers  GI:  As noted in HPI  NEURO:  As noted in HPI        Objective    /80 (BP Location: Right arm, Patient Position: Sitting, Cuff Size: Adult Large)   Pulse 80   Temp 98.8  F (37.1  C) (Oral)   Resp 18   Wt 103.9 kg (229 lb)   LMP 10/17/2020   SpO2 98%   There is no height or weight on file to calculate BMI.  Physical Exam   GENERAL: No acute distress  HEENT: Normocephalic  NEURO: Alert and non-focal        Assessment & Plan     Severe menstrual cramps  Discussed trying to start naproxen 2 days prior to cramping starting. Referral to OB/GYN placed.  Discussed some options but mother is concerned about using any birth control.  - naproxen (NAPROSYN) 500 MG tablet; Take 1 tablet (500 mg) by mouth 2 times daily as needed for moderate pain (start 2 days prior to cramping starting)  - OB/GYN REFERRAL    Migraine with aura and without status migrainosus, not  intractable  Headaches are migraine like in nature. Try imitrex. She does have an aura with the migraines.  - SUMAtriptan (IMITREX) 50 MG tablet; Take 1 tablet (50 mg) by mouth at onset of headache for migraine May repeat in 2 hours. Max 4 tablets/24 hours.    Obesity without serious comorbidity in pediatric patient, unspecified BMI, unspecified obesity type  Patient notes she exercises and eats well but still does not lose weight. Discussed referral to nutrition and she was agreeable.  - NUTRITION REFERRAL          Return if symptoms worsen or fail to improve.    Eileen Pizano PA-C  [facility]     Unable to assess due to medical condition

## 2024-05-31 ENCOUNTER — APPOINTMENT (OUTPATIENT)
Dept: GENERAL RADIOLOGY | Facility: CLINIC | Age: 19
End: 2024-05-31
Attending: FAMILY MEDICINE
Payer: COMMERCIAL

## 2024-05-31 ENCOUNTER — HOSPITAL ENCOUNTER (EMERGENCY)
Facility: CLINIC | Age: 19
Discharge: HOME | End: 2024-05-31
Attending: FAMILY MEDICINE | Admitting: FAMILY MEDICINE
Payer: COMMERCIAL

## 2024-05-31 VITALS
WEIGHT: 250 LBS | HEART RATE: 93 BPM | TEMPERATURE: 98.9 F | OXYGEN SATURATION: 100 % | RESPIRATION RATE: 14 BRPM | SYSTOLIC BLOOD PRESSURE: 99 MMHG | HEIGHT: 69 IN | DIASTOLIC BLOOD PRESSURE: 63 MMHG | BODY MASS INDEX: 37.03 KG/M2

## 2024-05-31 DIAGNOSIS — J06.9 URI WITH COUGH AND CONGESTION: ICD-10-CM

## 2024-05-31 LAB
FLUAV RNA SPEC QL NAA+PROBE: NEGATIVE
FLUBV RNA RESP QL NAA+PROBE: NEGATIVE
GROUP A STREP BY PCR: NOT DETECTED
HCG UR QL: NEGATIVE
INTERNAL QC OK POCT: NORMAL
POCT KIT EXPIRATION DATE: NORMAL
POCT KIT LOT NUMBER: NORMAL
RSV RNA SPEC NAA+PROBE: NEGATIVE
SARS-COV-2 RNA RESP QL NAA+PROBE: NEGATIVE

## 2024-05-31 PROCEDURE — 87637 SARSCOV2&INF A&B&RSV AMP PRB: CPT | Performed by: FAMILY MEDICINE

## 2024-05-31 PROCEDURE — 99284 EMERGENCY DEPT VISIT MOD MDM: CPT | Mod: 25 | Performed by: FAMILY MEDICINE

## 2024-05-31 PROCEDURE — 99284 EMERGENCY DEPT VISIT MOD MDM: CPT | Performed by: FAMILY MEDICINE

## 2024-05-31 PROCEDURE — 71046 X-RAY EXAM CHEST 2 VIEWS: CPT

## 2024-05-31 PROCEDURE — 87651 STREP A DNA AMP PROBE: CPT | Performed by: FAMILY MEDICINE

## 2024-05-31 PROCEDURE — 81025 URINE PREGNANCY TEST: CPT | Performed by: FAMILY MEDICINE

## 2024-05-31 RX ORDER — GUAIFENESIN 600 MG/1
1200 TABLET, EXTENDED RELEASE ORAL 2 TIMES DAILY
Qty: 20 TABLET | Refills: 0 | Status: SHIPPED | OUTPATIENT
Start: 2024-05-31 | End: 2024-09-11

## 2024-05-31 RX ORDER — AZELASTINE 1 MG/ML
2 SPRAY, METERED NASAL 2 TIMES DAILY
Qty: 30 ML | Refills: 0 | Status: SHIPPED | OUTPATIENT
Start: 2024-05-31

## 2024-05-31 ASSESSMENT — COLUMBIA-SUICIDE SEVERITY RATING SCALE - C-SSRS
1. IN THE PAST MONTH, HAVE YOU WISHED YOU WERE DEAD OR WISHED YOU COULD GO TO SLEEP AND NOT WAKE UP?: NO
2. HAVE YOU ACTUALLY HAD ANY THOUGHTS OF KILLING YOURSELF IN THE PAST MONTH?: NO
6. HAVE YOU EVER DONE ANYTHING, STARTED TO DO ANYTHING, OR PREPARED TO DO ANYTHING TO END YOUR LIFE?: NO

## 2024-05-31 ASSESSMENT — ACTIVITIES OF DAILY LIVING (ADL)
ADLS_ACUITY_SCORE: 35
ADLS_ACUITY_SCORE: 33

## 2024-05-31 NOTE — ED TRIAGE NOTES
Patient presents due to sore throat, coughing up mucus, nasal congestion, chills and ear popping. Patient has a history of sinus infections in the past. Patient reports 7/10 soreness to throat.      Triage Assessment (Adult)       Row Name 05/31/24 1315          Triage Assessment    Airway WDL WDL        Respiratory WDL    Respiratory WDL X;cough     Cough Frequency frequent     Cough Type congested;loose;productive        Skin Circulation/Temperature WDL    Skin Circulation/Temperature WDL WDL        Cardiac WDL    Cardiac WDL WDL        Peripheral/Neurovascular WDL    Peripheral Neurovascular WDL WDL        Cognitive/Neuro/Behavioral WDL    Cognitive/Neuro/Behavioral WDL WDL

## 2024-05-31 NOTE — ED PROVIDER NOTES
"ED Provider Note  [facility]      History     Chief Complaint   Patient presents with    Pharyngitis     Patient presents due to sore throat, coughing up mucus, nasal congestion, chills and ear popping. Patient has a history of sinus infections in the past. Patient reports 7/10 soreness to throat.      HPI  Hector Mazariegos is a 18 year old female with a history of provoked PE and anxiety presents to the emergency department for 3 days of cough, chills, and sore throat.     Patient has been having eye watering, ear popping, nasal and sinus congestion, for 3 days.  She is also been having a cough which was productive yesterday, but this morning was dry and patient noted spots of red when she coughed.  Patient has been using cough medicine at home, DayQuil and NyQuil, with some relief.  Patient denies seasonal allergies.  Patient did not take an at-home COVID test.    Past Medical History  No past medical history on file.  No past surgical history on file.  azelastine (ASTELIN) 0.1 % nasal spray  Benzocaine-Menthol 10-2.1 MG LOZG  clindamycin (CLEOCIN T) 1 % SWAB  escitalopram (LEXAPRO) 10 MG tablet  guaiFENesin (MUCINEX) 600 MG 12 hr tablet  ibuprofen (ADVIL/MOTRIN) 200 MG tablet      No Known Allergies  Family History  Family History   Problem Relation Age of Onset    Diabetes Mother     Diabetes Brother     Alzheimer Disease Paternal Grandmother      Social History   Social History     Tobacco Use    Smoking status: Never    Smokeless tobacco: Never   Vaping Use    Vaping status: Never Used   Substance Use Topics    Alcohol use: No    Drug use: No      A medically appropriate review of systems was performed with pertinent positives and negatives noted in the HPI, and all other systems negative.    Physical Exam   BP: 99/63  Pulse: 93  Temp: 98.9  F (37.2  C)  Resp: 14  Height: 175.3 cm (5' 9\")  Weight: 113.4 kg (250 lb)  SpO2: 100 %  Physical Exam  Vitals and nursing note reviewed. "   Constitutional:       General: She is not in acute distress.     Appearance: Normal appearance. She is not diaphoretic.   HENT:      Head: Atraumatic.      Nose: Congestion and rhinorrhea present.      Right Sinus: Maxillary sinus tenderness present.      Left Sinus: Maxillary sinus tenderness present.      Mouth/Throat:      Mouth: Mucous membranes are moist.      Pharynx: Uvula midline. Posterior oropharyngeal erythema present. No pharyngeal swelling or oropharyngeal exudate.   Eyes:      General: No scleral icterus.     Conjunctiva/sclera: Conjunctivae normal.      Pupils: Pupils are equal, round, and reactive to light.   Cardiovascular:      Rate and Rhythm: Normal rate.      Heart sounds: Normal heart sounds.   Pulmonary:      Effort: No respiratory distress.      Breath sounds: Normal breath sounds.   Abdominal:      General: Abdomen is flat.   Musculoskeletal:      Cervical back: Neck supple.   Skin:     General: Skin is warm.      Findings: No rash.   Neurological:      Mental Status: She is alert.           ED Course, Procedures, & Data      Procedures                 Results for orders placed or performed during the hospital encounter of 05/31/24   XR Chest 2 Views     Status: None    Narrative    XR CHEST 2 VIEWS   5/31/2024 2:27 PM     HISTORY: cough    COMPARISON: Chest radiograph 11/6/2023      Impression    IMPRESSION: No acute cardiopulmonary disease.    SHARMILA MAYNARD MD         SYSTEM ID:  [id number]   Symptomatic Influenza A/B, RSV, & SARS-CoV2 PCR (COVID-19) Nasopharyngeal     Status: Normal    Specimen: Nasopharyngeal; Swab   Result Value Ref Range    Influenza A PCR Negative Negative    Influenza B PCR Negative Negative    RSV PCR Negative Negative    SARS CoV2 PCR Negative Negative    Narrative    Testing was performed using the Xpert Xpress CoV2/Flu/RSV Assay on the [employer] GeneXpert Instrument. This test should be ordered for the detection of SARS-CoV-2, influenza, and RSV viruses in  individuals who meet clinical and/or epidemiological criteria. Test performance is unknown in asymptomatic patients. This test is for in vitro diagnostic use under the FDA EUA for laboratories certified under CLIA to perform high or moderate complexity testing. This test has not been FDA cleared or approved. A negative result does not rule out the presence of PCR inhibitors in the specimen or target RNA in concentration below the limit of detection for the assay. If only one viral target is positive but coinfection with multiple targets is suspected, the sample should be re-tested with another FDA cleared, approved, or authorized test, if coinfection would change clinical management. This test was validated by the [facility] [address]. These laboratories are certified under the Clinical Laboratory Improvement Amendments of 1988 (CLIA-88) as qualified to perform high complexity laboratory testing.   hCG qual urine POCT     Status: Normal   Result Value Ref Range    HCG Qual Urine Negative Negative    Internal QC Check POCT Valid Valid    POCT Kit Lot Number [id number]     POCT Kit Expiration Date 10/25/2025    Group A Streptococcus PCR Throat Swab     Status: Normal    Specimen: Throat; Swab   Result Value Ref Range    Group A strep by PCR Not Detected Not Detected    Narrative    The Xpert Xpress Strep A test, performed on the [employer], is a rapid, qualitative in vitro diagnostic test for the detection of Streptococcus pyogenes (Group A ß-hemolytic Streptococcus, Strep A) in throat swab specimens from patients with signs and symptoms of pharyngitis. The Xpert Xpress Strep A test can be used as an aid in the diagnosis of Group A Streptococcal pharyngitis. The assay is not intended to monitor treatment for Group A Streptococcus infections. The Xpert Xpress Strep A test utilizes an automated real-time polymerase chain reaction (PCR) to detect Streptococcus pyogenes DNA.     Medications - No  data to display  Labs Ordered and Resulted from Time of ED Arrival to Time of ED Departure   INFLUENZA A/B, RSV, & SARS-COV2 PCR - Normal       Result Value    Influenza A PCR Negative      Influenza B PCR Negative      RSV PCR Negative      SARS CoV2 PCR Negative     HCG QUALITATIVE URINE POCT - Normal    HCG Qual Urine Negative      Internal QC Check POCT Valid      POCT Kit Lot Number [id number]      POCT Kit Expiration Date 10/25/2025     GROUP A STREPTOCOCCUS PCR THROAT SWAB - Normal    Group A strep by PCR Not Detected       XR Chest 2 Views   Final Result   IMPRESSION: No acute cardiopulmonary disease.      SHARMILA MAYNARD MD            SYSTEM ID:  [id number]             Critical care was not performed.     Medical Decision Making  The patient's presentation was of moderate complexity (an acute illness with systemic symptoms).    The patient's evaluation involved:  review of external note(s) from 2 sources (reviewed documentation regarding the patient's prior history of provoked PE in January 2023)  ordering and/or review of 3+ test(s) in this encounter (see separate area of note for details)    The patient's management necessitated moderate risk (prescription drug management including medications given in the ED).    Assessment & Plan    A 18-year-old female presenting with sore throat cough nasal congestion and chills.  History of allergies and sinus infections.  On exam she has normal vital signs and is in no respiratory distress.  She has congested nasal mucosa and bilateral maxillary sinus tenderness.  Tympanic membranes and external auditory canals clear.  Oropharynx with symmetric injection of the posterior pharynx, uvula midline, no tonsillar enlargement or exudates.  Neck supple without adenopathy.  Lungs clear.  Testing for RSV, flu, COVID, strep all negative.  Chest x-ray also normal.  Patient has an acute URI with sinus symptoms of 3 days duration.  Likely viral etiology.  Nothing to suggest lower  respiratory tract disease at this time.  She does have a prior history of provoked PE but has not had any recent provocations, her oxygen saturation is 100% on room air, she is not tachycardic respirations are 14 and there is no chest pain or pleuritis.  Do not think a workup for PE is indicated.  I will plan to treat symptomatically for viral URI/sinusitis.  We discussed the indications for emergency department return and follow-up.  Stable for discharge.      I have reviewed the nursing notes. I have reviewed the findings, diagnosis, plan and need for follow up with the patient.    New Prescriptions    AZELASTINE (ASTELIN) 0.1 % NASAL SPRAY    Spray 2 sprays into both nostrils 2 times daily    BENZOCAINE-MENTHOL 10-2.1 MG LOZG    Take 1 lozenge by mouth 4 times daily as needed (Cough or sore throat)    GUAIFENESIN (MUCINEX) 600 MG 12 HR TABLET    Take 2 tablets (1,200 mg) by mouth 2 times daily       Final diagnoses:   URI with cough and congestion   I, Rey Rivas, am serving as a trained medical scribe to document services personally performed by Mark Norman MD, based to the provider's statements to me.     I, Mark Norman MD, was physically present and have reviewed and verified the accuracy of this note documented by Rey Rivas.     Mark Norman MD  [facility] EMERGENCY DEPARTMENT  5/31/2024     Mark Norman MD  05/31/24 8525

## 2024-05-31 NOTE — DISCHARGE INSTRUCTIONS
Thank you for choosing [facility].     Please closely monitor for further symptoms. Return to the Emergency Department if you develop any new or worsening signs or symptoms.    If you received any opiate pain medications or sedatives during your visit, please do not drive for at least 8 hours.     Labs, cultures or final xray interpretations may still need to be reviewed.  We will call you if your plan of care needs to be changed.    Please follow up with your primary care physician or clinic.

## 2024-06-04 ENCOUNTER — HOSPITAL ENCOUNTER (EMERGENCY)
Facility: CLINIC | Age: 19
Discharge: HOME | End: 2024-06-04
Attending: EMERGENCY MEDICINE | Admitting: EMERGENCY MEDICINE
Payer: COMMERCIAL

## 2024-06-04 VITALS
DIASTOLIC BLOOD PRESSURE: 91 MMHG | BODY MASS INDEX: 37.22 KG/M2 | HEIGHT: 69 IN | WEIGHT: 251.32 LBS | RESPIRATION RATE: 18 BRPM | HEART RATE: 53 BPM | SYSTOLIC BLOOD PRESSURE: 134 MMHG | TEMPERATURE: 97.1 F | OXYGEN SATURATION: 100 %

## 2024-06-04 DIAGNOSIS — N93.9 VAGINAL BLEEDING: ICD-10-CM

## 2024-06-04 DIAGNOSIS — R10.9 ABDOMINAL CRAMPING: ICD-10-CM

## 2024-06-04 LAB
ALBUMIN SERPL BCG-MCNC: 3.8 G/DL (ref 3.5–5.2)
ALBUMIN UR-MCNC: 30 MG/DL
ALP SERPL-CCNC: 87 U/L (ref 40–150)
ALT SERPL W P-5'-P-CCNC: 17 U/L (ref 0–50)
ANION GAP SERPL CALCULATED.3IONS-SCNC: 12 MMOL/L (ref 7–15)
APPEARANCE UR: CLEAR
AST SERPL W P-5'-P-CCNC: 18 U/L (ref 0–45)
ATRIAL RATE - MUSE: 57 BPM
BASOPHILS # BLD AUTO: 0 10E3/UL (ref 0–0.2)
BASOPHILS NFR BLD AUTO: 0 %
BILIRUB DIRECT SERPL-MCNC: <0.2 MG/DL (ref 0–0.3)
BILIRUB SERPL-MCNC: 0.3 MG/DL
BILIRUB UR QL STRIP: NEGATIVE
BUN SERPL-MCNC: 8.2 MG/DL (ref 6–20)
CALCIUM SERPL-MCNC: 8.4 MG/DL (ref 8.6–10)
CHLORIDE SERPL-SCNC: 104 MMOL/L (ref 98–107)
COLOR UR AUTO: YELLOW
CREAT SERPL-MCNC: 0.63 MG/DL (ref 0.51–0.95)
DEPRECATED HCO3 PLAS-SCNC: 22 MMOL/L (ref 22–29)
DIASTOLIC BLOOD PRESSURE - MUSE: NORMAL MMHG
EGFRCR SERPLBLD CKD-EPI 2021: >90 ML/MIN/1.73M2
EOSINOPHIL # BLD AUTO: 0 10E3/UL (ref 0–0.7)
EOSINOPHIL NFR BLD AUTO: 1 %
ERYTHROCYTE [DISTWIDTH] IN BLOOD BY AUTOMATED COUNT: 17.8 % (ref 10–15)
GLUCOSE SERPL-MCNC: 88 MG/DL (ref 70–99)
GLUCOSE UR STRIP-MCNC: NEGATIVE MG/DL
HCG SERPL QL: NEGATIVE
HCT VFR BLD AUTO: 33.4 % (ref 35–47)
HGB BLD-MCNC: 9.8 G/DL (ref 11.7–15.7)
HGB UR QL STRIP: ABNORMAL
HOLD SPECIMEN: NORMAL
IMM GRANULOCYTES # BLD: 0 10E3/UL
IMM GRANULOCYTES NFR BLD: 0 %
INTERPRETATION ECG - MUSE: NORMAL
KETONES UR STRIP-MCNC: 10 MG/DL
LEUKOCYTE ESTERASE UR QL STRIP: NEGATIVE
LIPASE SERPL-CCNC: 24 U/L (ref 13–60)
LYMPHOCYTES # BLD AUTO: 1.9 10E3/UL (ref 0.8–5.3)
LYMPHOCYTES NFR BLD AUTO: 30 %
MCH RBC QN AUTO: 21.6 PG (ref 26.5–33)
MCHC RBC AUTO-ENTMCNC: 29.3 G/DL (ref 31.5–36.5)
MCV RBC AUTO: 74 FL (ref 78–100)
MONOCYTES # BLD AUTO: 0.4 10E3/UL (ref 0–1.3)
MONOCYTES NFR BLD AUTO: 6 %
MUCOUS THREADS #/AREA URNS LPF: PRESENT /LPF
NEUTROPHILS # BLD AUTO: 4 10E3/UL (ref 1.6–8.3)
NEUTROPHILS NFR BLD AUTO: 63 %
NITRATE UR QL: NEGATIVE
NRBC # BLD AUTO: 0 10E3/UL
NRBC BLD AUTO-RTO: 0 /100
P AXIS - MUSE: NORMAL DEGREES
PH UR STRIP: 6 [PH] (ref 5–7)
PLATELET # BLD AUTO: 447 10E3/UL (ref 150–450)
POTASSIUM SERPL-SCNC: 3.5 MMOL/L (ref 3.4–5.3)
PR INTERVAL - MUSE: 144 MS
PROT SERPL-MCNC: 7.2 G/DL (ref 6.4–8.3)
QRS DURATION - MUSE: 82 MS
QT - MUSE: 442 MS
QTC - MUSE: 430 MS
R AXIS - MUSE: 146 DEGREES
RBC # BLD AUTO: 4.53 10E6/UL (ref 3.8–5.2)
RBC URINE: 84 /HPF
SODIUM SERPL-SCNC: 138 MMOL/L (ref 135–145)
SP GR UR STRIP: 1.03 (ref 1–1.03)
SQUAMOUS EPITHELIAL: 1 /HPF
SYSTOLIC BLOOD PRESSURE - MUSE: NORMAL MMHG
T AXIS - MUSE: 153 DEGREES
TROPONIN T SERPL HS-MCNC: <6 NG/L
UROBILINOGEN UR STRIP-MCNC: 2 MG/DL
VENTRICULAR RATE- MUSE: 57 BPM
WBC # BLD AUTO: 6.4 10E3/UL (ref 4–11)
WBC URINE: 7 /HPF

## 2024-06-04 PROCEDURE — 96361 HYDRATE IV INFUSION ADD-ON: CPT

## 2024-06-04 PROCEDURE — 84703 CHORIONIC GONADOTROPIN ASSAY: CPT | Performed by: EMERGENCY MEDICINE

## 2024-06-04 PROCEDURE — 82248 BILIRUBIN DIRECT: CPT | Performed by: EMERGENCY MEDICINE

## 2024-06-04 PROCEDURE — 93005 ELECTROCARDIOGRAM TRACING: CPT

## 2024-06-04 PROCEDURE — 99284 EMERGENCY DEPT VISIT MOD MDM: CPT | Mod: 25

## 2024-06-04 PROCEDURE — 80048 BASIC METABOLIC PNL TOTAL CA: CPT | Performed by: EMERGENCY MEDICINE

## 2024-06-04 PROCEDURE — 80053 COMPREHEN METABOLIC PANEL: CPT | Performed by: EMERGENCY MEDICINE

## 2024-06-04 PROCEDURE — 85025 COMPLETE CBC W/AUTO DIFF WBC: CPT | Performed by: EMERGENCY MEDICINE

## 2024-06-04 PROCEDURE — 83690 ASSAY OF LIPASE: CPT | Performed by: EMERGENCY MEDICINE

## 2024-06-04 PROCEDURE — 258N000003 HC RX IP 258 OP 636: Performed by: EMERGENCY MEDICINE

## 2024-06-04 PROCEDURE — 96374 THER/PROPH/DIAG INJ IV PUSH: CPT

## 2024-06-04 PROCEDURE — 84484 ASSAY OF TROPONIN QUANT: CPT | Performed by: EMERGENCY MEDICINE

## 2024-06-04 PROCEDURE — 250N000011 HC RX IP 250 OP 636: Performed by: EMERGENCY MEDICINE

## 2024-06-04 PROCEDURE — 85049 AUTOMATED PLATELET COUNT: CPT | Performed by: EMERGENCY MEDICINE

## 2024-06-04 PROCEDURE — 36415 COLL VENOUS BLD VENIPUNCTURE: CPT | Performed by: EMERGENCY MEDICINE

## 2024-06-04 PROCEDURE — 81001 URINALYSIS AUTO W/SCOPE: CPT | Performed by: EMERGENCY MEDICINE

## 2024-06-04 RX ORDER — ONDANSETRON 4 MG/1
4 TABLET, ORALLY DISINTEGRATING ORAL ONCE
Status: COMPLETED | OUTPATIENT
Start: 2024-06-04 | End: 2024-06-04

## 2024-06-04 RX ORDER — KETOROLAC TROMETHAMINE 15 MG/ML
15 INJECTION, SOLUTION INTRAMUSCULAR; INTRAVENOUS ONCE
Status: COMPLETED | OUTPATIENT
Start: 2024-06-04 | End: 2024-06-04

## 2024-06-04 RX ORDER — IBUPROFEN 600 MG/1
600 TABLET, FILM COATED ORAL EVERY 6 HOURS PRN
Qty: 30 TABLET | Refills: 1 | Status: SHIPPED | OUTPATIENT
Start: 2024-06-04 | End: 2024-09-11

## 2024-06-04 RX ADMIN — KETOROLAC TROMETHAMINE 15 MG: 15 INJECTION, SOLUTION INTRAMUSCULAR; INTRAVENOUS at 17:18

## 2024-06-04 RX ADMIN — SODIUM CHLORIDE 1000 ML: 9 INJECTION, SOLUTION INTRAVENOUS at 17:18

## 2024-06-04 RX ADMIN — ONDANSETRON 4 MG: 4 TABLET, ORALLY DISINTEGRATING ORAL at 14:53

## 2024-06-04 ASSESSMENT — ACTIVITIES OF DAILY LIVING (ADL)
ADLS_ACUITY_SCORE: 35
ADLS_ACUITY_SCORE: 35

## 2024-06-04 ASSESSMENT — COLUMBIA-SUICIDE SEVERITY RATING SCALE - C-SSRS
1. IN THE PAST MONTH, HAVE YOU WISHED YOU WERE DEAD OR WISHED YOU COULD GO TO SLEEP AND NOT WAKE UP?: NO
6. HAVE YOU EVER DONE ANYTHING, STARTED TO DO ANYTHING, OR PREPARED TO DO ANYTHING TO END YOUR LIFE?: NO
2. HAVE YOU ACTUALLY HAD ANY THOUGHTS OF KILLING YOURSELF IN THE PAST MONTH?: NO

## 2024-06-04 NOTE — ED TRIAGE NOTES
Presents to triage with c/o severe menstrual cramps. Patient states it is the first day of her period and she is not bleeding heavily but that her cramps are severe. She also states it is causing n/v and states she had a syncope episode earlier today. She was standing when she had syncope, denies hitting head, denies any injury.

## 2024-06-04 NOTE — ED PROVIDER NOTES
"  Emergency Department Note      History of Present Illness     Chief Complaint  Abdominal Pain, Nausea & Vomiting, Syncope, and Dizziness    HPI  Hector Mazariegos is a 18 year old female  who presents to the ED with a family member for abdominal pain. Patient reports that today she started her period and began having abdominal cramps that are worse than baseline. She describes the abdominal pain as sharp, shooting and worse on the left side. She also reports a near syncopal episode, vomiting, sweating, and nausea. Denies any differences with vaginal bleeding or changes in urination. Denies blood in urine, pain in urine, fever, chills. No surgery history. Denies food or water today. Of note, was just seen for an upper respiratory infection 4 days ago but this pain feels different and is in a different location.    Independent Historian  None as detailed above.    Review of External Notes  None    Past Medical History   Medical History and Problem List  Pulmonary embolism    Medications  Lexapro    Physical Exam   Patient Vitals for the past 24 hrs:   BP Temp Temp src Pulse Resp SpO2 Height Weight   06/04/24 1645 -- -- -- -- -- 100 % -- --   06/04/24 1640 -- -- -- -- -- 98 % -- --   06/04/24 1639 -- -- -- -- -- 100 % -- --   06/04/24 1638 -- -- -- -- -- 95 % -- --   06/04/24 1636 120/86 -- -- -- -- -- -- --   06/04/24 1450 103/56 -- -- -- -- -- -- --   06/04/24 1448 -- 97.1  F (36.2  C) Temporal 69 18 98 % 1.753 m (5' 9\") 114 kg (251 lb 5.2 oz)     Physical Exam  General: Adult sitting upright  Eyes: PERRL, Conjunctive within normal limits. No scleral icterus.  ENT: Moist mucous membranes, oropharynx clear.   CV: Normal S1S2, no murmur, rub or gallop. Regular rate and rhythm  Resp: Clear to auscultation bilaterally, no wheezes, rales or rhonchi. Normal respiratory effort.  GI: Abdomen is soft and nondistended. Tender diffusely in the lower abdomen, left side of the abdomen and mildly in the epigastrium. No palpable " masses. No rebound or guarding.  MSK: No edema. Nontender. Normal active range of motion.  Skin: Warm and dry. No rashes or lesions or ecchymoses on visible skin.  Neuro: Alert and oriented. Responds appropriately to all questions and commands. No focal findings appreciated. Normal muscle tone.  Psych: Normal mood and affect. Pleasant.     Diagnostics   Lab Results   Labs Ordered and Resulted from Time of ED Arrival to Time of ED Departure   BASIC METABOLIC PANEL - Abnormal       Result Value    Sodium 138      Potassium 3.5      Chloride 104      Carbon Dioxide (CO2) 22      Anion Gap 12      Urea Nitrogen 8.2      Creatinine 0.63      GFR Estimate >90      Calcium 8.4 (*)     Glucose 88     CBC WITH PLATELETS AND DIFFERENTIAL - Abnormal    WBC Count 6.4      RBC Count 4.53      Hemoglobin 9.8 (*)     Hematocrit 33.4 (*)     MCV 74 (*)     MCH 21.6 (*)     MCHC 29.3 (*)     RDW 17.8 (*)     Platelet Count 447      % Neutrophils 63      % Lymphocytes 30      % Monocytes 6      % Eosinophils 1      % Basophils 0      % Immature Granulocytes 0      NRBCs per 100 WBC 0      Absolute Neutrophils 4.0      Absolute Lymphocytes 1.9      Absolute Monocytes 0.4      Absolute Eosinophils 0.0      Absolute Basophils 0.0      Absolute Immature Granulocytes 0.0      Absolute NRBCs 0.0     TROPONIN T, HIGH SENSITIVITY - Normal    Troponin T, High Sensitivity <6     HCG QUALITATIVE PREGNANCY - Normal    hCG Serum Qualitative Negative       EKG   ECG results from 06/04/24   EKG 12-lead, tracing only     Value    Systolic Blood Pressure     Diastolic Blood Pressure     Ventricular Rate 57    Atrial Rate 57    UT Interval 144    QRS Duration 82        QTc 430    P Axis     R AXIS 146    T Axis 153    Interpretation ECG      Sinus bradycardia with marked sinus arrhythmia  Right axis deviation  Nonspecific ST and T wave abnormality  Abnormal ECG  When compared with ECG of 16-MAY-2023 08:12,  QRS axis Shifted right  ST no longer  "elevated in Lateral leads  Unconfirmed report - interpretation of this ECG is computer generated - see medical record for final interpretation  Confirmed by - EMERGENCY ROOM, PHYSICIAN ([id number]),  Tommy Jolly ([id number]) on 6/4/2024 3:47:56 PM       Independent Interpretation  None    ED Course    Medications Administered  Medications   ketorolac (TORADOL) injection 15 mg (has no administration in time range)   ondansetron (ZOFRAN ODT) ODT tab 4 mg (4 mg Oral $Given 6/4/24 2116)       Discussion of Management  None    Social Determinants of Health adding to complexity of care  None    ED Course  ED Course as of 06/04/24 2013 Tue Jun 04, 2024    I assessed the patient when roomed in [facility].   1834 Rechecked patient. Repeat abdominal exam is benign.  She notes she is feeling \"much better.     Medical Decision Making / Diagnosis     MDM  Hector Mazariegos is a 18 year old female who presents [facility] department with lower abdominal cramping in the setting of the start of her period.  Her symptoms are worse than baseline so she presented to the emergency department for assessment.  They do not localize to any 1 quadrant, being diffuse in the lower abdomen.  She has no fever.  Her labs are reassuring.  Her pain resolved with use of Toradol.  I do not suspect in this situation ectopic pregnancy, ovarian cyst, hemorrhage, appendicitis, or other acute surgical pathology.  Her abdomen is benign on reassessment and I think at this time it is reasonable to discharge home with ongoing supportive care.  Return precautions including fever, localizing pain, uncontrolled pain or vomiting, were discussed with the patient.  She will return if this occurs.    Disposition  The patient was discharged.     ICD-10 Codes:    ICD-10-CM    1. Abdominal cramping  R10.9       2. Vaginal bleeding  N93.9            Scribe Disclosure:  I, Sukhdeep Clemons, am serving as a scribe at 5:35 PM on 6/4/2024 to document services personally " performed by Lien Meadows MD based on my observations and the provider's statements to me.        Lien Meadows MD  06/04/24 [id number]

## 2024-09-11 ENCOUNTER — HOSPITAL ENCOUNTER (EMERGENCY)
Facility: CLINIC | Age: 19
Discharge: LEFT BEFORE DISCHARGE | End: 2024-09-11
Admitting: EMERGENCY MEDICINE
Payer: COMMERCIAL

## 2024-09-11 ENCOUNTER — RESULTS ONLY (OUTPATIENT)
Facility: CLINIC | Age: 19
End: 2024-09-11

## 2024-09-11 ENCOUNTER — HOSPITAL ENCOUNTER (EMERGENCY)
Facility: CLINIC | Age: 19
Discharge: HOME | End: 2024-09-12
Attending: EMERGENCY MEDICINE | Admitting: EMERGENCY MEDICINE
Payer: COMMERCIAL

## 2024-09-11 VITALS
OXYGEN SATURATION: 100 % | DIASTOLIC BLOOD PRESSURE: 77 MMHG | HEART RATE: 104 BPM | SYSTOLIC BLOOD PRESSURE: 118 MMHG | TEMPERATURE: 98.2 F | RESPIRATION RATE: 16 BRPM | WEIGHT: 251 LBS | BODY MASS INDEX: 37.07 KG/M2

## 2024-09-11 DIAGNOSIS — R11.2 NAUSEA AND VOMITING, UNSPECIFIED VOMITING TYPE: ICD-10-CM

## 2024-09-11 DIAGNOSIS — D50.9 MICROCYTIC ANEMIA: ICD-10-CM

## 2024-09-11 DIAGNOSIS — R94.31 PROLONGED Q-T INTERVAL ON ECG: ICD-10-CM

## 2024-09-11 DIAGNOSIS — R10.9 ABDOMINAL PAIN, UNSPECIFIED ABDOMINAL LOCATION: ICD-10-CM

## 2024-09-11 LAB
ALBUMIN SERPL BCG-MCNC: 4.9 G/DL (ref 3.5–5.2)
ALBUMIN UR-MCNC: 70 MG/DL
ALP SERPL-CCNC: 81 U/L (ref 40–150)
ALT SERPL W P-5'-P-CCNC: 16 U/L (ref 0–50)
AMPHETAMINES UR QL SCN: ABNORMAL
ANION GAP SERPL CALCULATED.3IONS-SCNC: 18 MMOL/L (ref 7–15)
APPEARANCE UR: CLEAR
AST SERPL W P-5'-P-CCNC: 30 U/L (ref 0–45)
BARBITURATES UR QL SCN: ABNORMAL
BASOPHILS # BLD AUTO: 0 10E3/UL (ref 0–0.2)
BASOPHILS NFR BLD AUTO: 0 %
BENZODIAZ UR QL SCN: ABNORMAL
BILIRUB SERPL-MCNC: 0.6 MG/DL
BILIRUB UR QL STRIP: NEGATIVE
BUN SERPL-MCNC: 13.3 MG/DL (ref 6–20)
BZE UR QL SCN: ABNORMAL
CALCIUM SERPL-MCNC: 9.5 MG/DL (ref 8.8–10.4)
CANNABINOIDS UR QL SCN: ABNORMAL
CHLORIDE SERPL-SCNC: 106 MMOL/L (ref 98–107)
COLOR UR AUTO: YELLOW
CREAT SERPL-MCNC: 0.75 MG/DL (ref 0.51–0.95)
EGFRCR SERPLBLD CKD-EPI 2021: >90 ML/MIN/1.73M2
EOSINOPHIL # BLD AUTO: 0 10E3/UL (ref 0–0.7)
EOSINOPHIL NFR BLD AUTO: 0 %
ERYTHROCYTE [DISTWIDTH] IN BLOOD BY AUTOMATED COUNT: 19.9 % (ref 10–15)
FENTANYL UR QL: ABNORMAL
GLUCOSE SERPL-MCNC: 128 MG/DL (ref 70–99)
GLUCOSE UR STRIP-MCNC: NEGATIVE MG/DL
HCG UR QL: NEGATIVE
HCO3 SERPL-SCNC: 21 MMOL/L (ref 22–29)
HCT VFR BLD AUTO: 37 % (ref 35–47)
HGB BLD-MCNC: 11.5 G/DL (ref 11.7–15.7)
HGB UR QL STRIP: NEGATIVE
IMM GRANULOCYTES # BLD: 0 10E3/UL
IMM GRANULOCYTES NFR BLD: 0 %
KETONES UR STRIP-MCNC: 60 MG/DL
LEUKOCYTE ESTERASE UR QL STRIP: NEGATIVE
LIPASE SERPL-CCNC: 29 U/L (ref 13–60)
LYMPHOCYTES # BLD AUTO: 1.9 10E3/UL (ref 0.8–5.3)
LYMPHOCYTES NFR BLD AUTO: 24 %
MAGNESIUM SERPL-MCNC: 1.9 MG/DL (ref 1.7–2.3)
MCH RBC QN AUTO: 22.4 PG (ref 26.5–33)
MCHC RBC AUTO-ENTMCNC: 31.1 G/DL (ref 31.5–36.5)
MCV RBC AUTO: 72 FL (ref 78–100)
MONOCYTES # BLD AUTO: 0.3 10E3/UL (ref 0–1.3)
MONOCYTES NFR BLD AUTO: 4 %
MUCOUS THREADS #/AREA URNS LPF: PRESENT /LPF
NEUTROPHILS # BLD AUTO: 5.7 10E3/UL (ref 1.6–8.3)
NEUTROPHILS NFR BLD AUTO: 72 %
NITRATE UR QL: NEGATIVE
NRBC # BLD AUTO: 0 10E3/UL
NRBC BLD AUTO-RTO: 0 /100
OPIATES UR QL SCN: ABNORMAL
PCP QUAL URINE (ROCHE): ABNORMAL
PH UR STRIP: 6 [PH] (ref 5–7)
PLATELET # BLD AUTO: 561 10E3/UL (ref 150–450)
POTASSIUM SERPL-SCNC: 3.9 MMOL/L (ref 3.4–5.3)
PROT SERPL-MCNC: 8.7 G/DL (ref 6.4–8.3)
RBC # BLD AUTO: 5.13 10E6/UL (ref 3.8–5.2)
RBC URINE: 1 /HPF
SODIUM SERPL-SCNC: 145 MMOL/L (ref 135–145)
SP GR UR STRIP: 1.02 (ref 1–1.03)
SQUAMOUS EPITHELIAL: 1 /HPF
TROPONIN T SERPL HS-MCNC: <6 NG/L
UROBILINOGEN UR STRIP-MCNC: 2 MG/DL
WBC # BLD AUTO: 7.9 10E3/UL (ref 4–11)
WBC URINE: 2 /HPF

## 2024-09-11 PROCEDURE — 87086 URINE CULTURE/COLONY COUNT: CPT | Performed by: EMERGENCY MEDICINE

## 2024-09-11 PROCEDURE — 96375 TX/PRO/DX INJ NEW DRUG ADDON: CPT | Performed by: EMERGENCY MEDICINE

## 2024-09-11 PROCEDURE — 93010 ELECTROCARDIOGRAM REPORT: CPT | Performed by: EMERGENCY MEDICINE

## 2024-09-11 PROCEDURE — 99285 EMERGENCY DEPT VISIT HI MDM: CPT | Mod: 25 | Performed by: EMERGENCY MEDICINE

## 2024-09-11 PROCEDURE — 96361 HYDRATE IV INFUSION ADD-ON: CPT | Performed by: EMERGENCY MEDICINE

## 2024-09-11 PROCEDURE — 96374 THER/PROPH/DIAG INJ IV PUSH: CPT | Performed by: EMERGENCY MEDICINE

## 2024-09-11 PROCEDURE — 84484 ASSAY OF TROPONIN QUANT: CPT | Performed by: EMERGENCY MEDICINE

## 2024-09-11 PROCEDURE — 250N000011 HC RX IP 250 OP 636: Performed by: EMERGENCY MEDICINE

## 2024-09-11 PROCEDURE — 85025 COMPLETE CBC W/AUTO DIFF WBC: CPT | Performed by: EMERGENCY MEDICINE

## 2024-09-11 PROCEDURE — 83550 IRON BINDING TEST: CPT

## 2024-09-11 PROCEDURE — 80053 COMPREHEN METABOLIC PANEL: CPT | Performed by: EMERGENCY MEDICINE

## 2024-09-11 PROCEDURE — 99285 EMERGENCY DEPT VISIT HI MDM: CPT | Performed by: EMERGENCY MEDICINE

## 2024-09-11 PROCEDURE — 81001 URINALYSIS AUTO W/SCOPE: CPT | Performed by: EMERGENCY MEDICINE

## 2024-09-11 PROCEDURE — 96372 THER/PROPH/DIAG INJ SC/IM: CPT | Performed by: EMERGENCY MEDICINE

## 2024-09-11 PROCEDURE — 81025 URINE PREGNANCY TEST: CPT | Performed by: EMERGENCY MEDICINE

## 2024-09-11 PROCEDURE — 83735 ASSAY OF MAGNESIUM: CPT | Performed by: EMERGENCY MEDICINE

## 2024-09-11 PROCEDURE — 36415 COLL VENOUS BLD VENIPUNCTURE: CPT | Performed by: EMERGENCY MEDICINE

## 2024-09-11 PROCEDURE — 258N000003 HC RX IP 258 OP 636: Performed by: EMERGENCY MEDICINE

## 2024-09-11 PROCEDURE — 83690 ASSAY OF LIPASE: CPT | Performed by: EMERGENCY MEDICINE

## 2024-09-11 PROCEDURE — 250N000009 HC RX 250: Performed by: EMERGENCY MEDICINE

## 2024-09-11 PROCEDURE — 99281 EMR DPT VST MAYX REQ PHY/QHP: CPT

## 2024-09-11 PROCEDURE — 83540 ASSAY OF IRON: CPT

## 2024-09-11 PROCEDURE — 80307 DRUG TEST PRSMV CHEM ANLYZR: CPT | Performed by: EMERGENCY MEDICINE

## 2024-09-11 PROCEDURE — 93005 ELECTROCARDIOGRAM TRACING: CPT | Performed by: EMERGENCY MEDICINE

## 2024-09-11 RX ORDER — MAGNESIUM HYDROXIDE/ALUMINUM HYDROXICE/SIMETHICONE 120; 1200; 1200 MG/30ML; MG/30ML; MG/30ML
15 SUSPENSION ORAL ONCE
Status: COMPLETED | OUTPATIENT
Start: 2024-09-11 | End: 2024-09-12

## 2024-09-11 RX ORDER — LIDOCAINE HYDROCHLORIDE 20 MG/ML
10 SOLUTION OROPHARYNGEAL ONCE
Status: COMPLETED | OUTPATIENT
Start: 2024-09-11 | End: 2024-09-12

## 2024-09-11 RX ORDER — OLANZAPINE 10 MG/2ML
5 INJECTION, POWDER, FOR SOLUTION INTRAMUSCULAR ONCE
Status: COMPLETED | OUTPATIENT
Start: 2024-09-11 | End: 2024-09-11

## 2024-09-11 RX ADMIN — PANTOPRAZOLE SODIUM 40 MG: 40 INJECTION, POWDER, FOR SOLUTION INTRAVENOUS at 22:23

## 2024-09-11 RX ADMIN — SODIUM CHLORIDE 1000 ML: 9 INJECTION, SOLUTION INTRAVENOUS at 22:07

## 2024-09-11 RX ADMIN — OLANZAPINE 5 MG: 10 INJECTION, POWDER, FOR SOLUTION INTRAMUSCULAR at 22:11

## 2024-09-11 ASSESSMENT — ACTIVITIES OF DAILY LIVING (ADL)
ADLS_ACUITY_SCORE: 35
ADLS_ACUITY_SCORE: 33
ADLS_ACUITY_SCORE: 35
ADLS_ACUITY_SCORE: 35

## 2024-09-11 NOTE — ED TRIAGE NOTES
Patient reports having vomiting a week ago and had a large workup.  Patient states she was prescribed medication and was unable to fill it.  Patient reports using tums and pepcid over the counter.  Patient states she is continuing to have acid reflux.  Patient is anxious about a large work up due to getting poked so many times      Triage Assessment (Adult)       Row Name 09/11/24 1232          Triage Assessment    Airway WDL WDL        Respiratory WDL    Respiratory WDL X  patient states she has a hard time breathing when she is vomiting        Skin Circulation/Temperature WDL    Skin Circulation/Temperature WDL WDL        Cardiac WDL    Cardiac WDL X;chest pain        Chest Pain Assessment    Chest Pain Location midsternal     Character burning        Peripheral/Neurovascular WDL    Peripheral Neurovascular WDL WDL        Cognitive/Neuro/Behavioral WDL    Cognitive/Neuro/Behavioral WDL WDL        Jo Coma Scale    Best Eye Response 4-->(E4) spontaneous     Best Motor Response 6-->(M6) obeys commands     Best Verbal Response 5-->(V5) oriented     [place] Coma Scale Score 15

## 2024-09-12 ENCOUNTER — APPOINTMENT (OUTPATIENT)
Dept: ULTRASOUND IMAGING | Facility: CLINIC | Age: 19
End: 2024-09-12
Attending: EMERGENCY MEDICINE
Payer: COMMERCIAL

## 2024-09-12 ENCOUNTER — NURSE TRIAGE (OUTPATIENT)
Dept: NURSING | Facility: CLINIC | Age: 19
End: 2024-09-12
Payer: COMMERCIAL

## 2024-09-12 VITALS
OXYGEN SATURATION: 100 % | DIASTOLIC BLOOD PRESSURE: 74 MMHG | HEIGHT: 69 IN | BODY MASS INDEX: 36.36 KG/M2 | TEMPERATURE: 97.9 F | HEART RATE: 72 BPM | SYSTOLIC BLOOD PRESSURE: 126 MMHG | RESPIRATION RATE: 16 BRPM | WEIGHT: 245.5 LBS

## 2024-09-12 LAB
ATRIAL RATE - MUSE: 46 BPM
ATRIAL RATE - MUSE: 48 BPM
ATRIAL RATE - MUSE: 49 BPM
BACTERIA UR CULT: NORMAL
DIASTOLIC BLOOD PRESSURE - MUSE: NORMAL MMHG
INTERPRETATION ECG - MUSE: NORMAL
P AXIS - MUSE: -20 DEGREES
P AXIS - MUSE: 5 DEGREES
P AXIS - MUSE: 6 DEGREES
PR INTERVAL - MUSE: 122 MS
PR INTERVAL - MUSE: 126 MS
PR INTERVAL - MUSE: 132 MS
QRS DURATION - MUSE: 80 MS
QRS DURATION - MUSE: 82 MS
QRS DURATION - MUSE: 88 MS
QT - MUSE: 532 MS
QT - MUSE: 542 MS
QT - MUSE: 560 MS
QTC - MUSE: 465 MS
QTC - MUSE: 484 MS
QTC - MUSE: 505 MS
R AXIS - MUSE: 34 DEGREES
R AXIS - MUSE: 38 DEGREES
R AXIS - MUSE: 41 DEGREES
SYSTOLIC BLOOD PRESSURE - MUSE: NORMAL MMHG
T AXIS - MUSE: 34 DEGREES
T AXIS - MUSE: 35 DEGREES
T AXIS - MUSE: 38 DEGREES
VENTRICULAR RATE- MUSE: 46 BPM
VENTRICULAR RATE- MUSE: 48 BPM
VENTRICULAR RATE- MUSE: 49 BPM

## 2024-09-12 PROCEDURE — 250N000011 HC RX IP 250 OP 636: Performed by: EMERGENCY MEDICINE

## 2024-09-12 PROCEDURE — 250N000009 HC RX 250: Performed by: EMERGENCY MEDICINE

## 2024-09-12 PROCEDURE — 250N000013 HC RX MED GY IP 250 OP 250 PS 637: Performed by: EMERGENCY MEDICINE

## 2024-09-12 PROCEDURE — 76705 ECHO EXAM OF ABDOMEN: CPT

## 2024-09-12 PROCEDURE — 258N000003 HC RX IP 258 OP 636: Performed by: EMERGENCY MEDICINE

## 2024-09-12 RX ORDER — LIDOCAINE HYDROCHLORIDE 20 MG/ML
SOLUTION OROPHARYNGEAL
Status: COMPLETED
Start: 2024-09-12 | End: 2024-09-12

## 2024-09-12 RX ORDER — DROPERIDOL 2.5 MG/ML
2.5 INJECTION, SOLUTION INTRAMUSCULAR; INTRAVENOUS ONCE
Status: COMPLETED | OUTPATIENT
Start: 2024-09-12 | End: 2024-09-12

## 2024-09-12 RX ADMIN — ALUMINUM HYDROXIDE, MAGNESIUM HYDROXIDE, AND SIMETHICONE 15 ML: 1200; 120; 1200 SUSPENSION ORAL at 00:21

## 2024-09-12 RX ADMIN — SODIUM CHLORIDE 1000 ML: 9 INJECTION, SOLUTION INTRAVENOUS at 04:05

## 2024-09-12 RX ADMIN — LIDOCAINE HYDROCHLORIDE 10 ML: 20 SOLUTION ORAL at 00:20

## 2024-09-12 RX ADMIN — DROPERIDOL 2.5 MG: 2.5 INJECTION, SOLUTION INTRAMUSCULAR; INTRAVENOUS at 04:08

## 2024-09-12 ASSESSMENT — ACTIVITIES OF DAILY LIVING (ADL)
ADLS_ACUITY_SCORE: 35

## 2024-09-12 NOTE — ED PROVIDER NOTES
ED Provider Note  [facility]      History     Chief Complaint   Patient presents with    Abdominal Pain     SOB due to abdominal pain/     Gastrophageal Reflux    Nausea, Vomiting, & Diarrhea     Ongoing nauseate, vomiting, and diarrhea  started yesterday. Recurrent symptoms. Was UC was given zofran and GI Mes with no improvement. Was prescribed with RX but pharmacy closed.      REGAN Mazariegos is a 18 year old female who presents to the emergency department for abdominal pain. The patient reports that she has had abdominal pain that radiates up her airway to her esophageus for the past 2 days. She reports that it feels like something is stuck in her stomach and needs to vomit in order to get it out. She is also having nausea, vomiting, acid reflux and back pain. She has had 2 episodes of diarrhea. It is noted that she did have a blood clot after her breast reduction but was treated with blood thinners. She is no longer taking any. She was seen at [place] emergency department 2 weeks ago for the same issues where its reported she was supposed to be hospitalized but did not want to stay down there as it was to far away from home and she would have no support system. Her pain went away on its own and is now back. She was seen at urgent care today where she was given Zofran and a GI cocktail. This did not help her symptoms and was advised to come to the emergency department for further evaluation. She does smoke marijuana about once a week. She used to smoke more often but decided to cut back. She denies any change or issues with urination, chance of pregnancy and heavy alcohol or ibuprofen usage.  No hematemesis or black or bloody stools.    Past Medical History  No past medical history on file.  No past surgical history on file.  clindamycin (CLEOCIN T) 1 % SWAB  omeprazole (PRILOSEC) 20 MG DR capsule  azelastine (ASTELIN) 0.1 % nasal spray      No Known Allergies  Family  "History  Family History   Problem Relation Age of Onset    Diabetes Mother     Diabetes Brother     Alzheimer Disease Paternal Grandmother      Social History   Social History     Tobacco Use    Smoking status: Never    Smokeless tobacco: Never   Vaping Use    Vaping status: Never Used   Substance Use Topics    Alcohol use: No    Drug use: No      A medically appropriate review of systems was performed with pertinent positives and negatives noted in the HPI, and all other systems negative.    Physical Exam   BP: 138/76  Pulse: 54  Temp: 97.9  F (36.6  C)  Resp: 19  Height: 175.3 cm (5' 9\")  Weight: 111.4 kg (245 lb 8 oz)  SpO2: 100 %  Physical Exam  General: awake, alert, holding an emesis bag and retching but not bringing up any vomitus  Head: normal cephalic  HEENT: pupils equal, conjugate gaze intact; no scleral icterus   Neck: Supple  CV: regular rate and rhythm without murmur  Lungs: clear to auscultation  Abd: Diffusely tender without guarding or peritoneal signs  EXT: lower extremities without swelling or edema  Neuro: awake, answers questions appropriately. No focal deficits noted      ED Course, Procedures, & Data      Procedures            EKG Interpretation:      Interpreted by Rex Cabrales  Time reviewed: 23:57  Symptoms at time of EK:57   Rhythm: sinus bradycardia  Rate: normal  Axis: normal  Ectopy: none  Conduction: normal  ST Segments/ T Waves: No ST-T wave changes  Q Waves: none  Comparison to prior: No old EKG available    Clinical Impression: prolonged QT, no signs of acute ischemia       Results for orders placed or performed during the hospital encounter of 24   Comprehensive metabolic panel     Status: Abnormal   Result Value Ref Range    Sodium 145 135 - 145 mmol/L    Potassium 3.9 3.4 - 5.3 mmol/L    Carbon Dioxide (CO2) 21 (L) 22 - 29 mmol/L    Anion Gap 18 (H) 7 - 15 mmol/L    Urea Nitrogen 13.3 6.0 - 20.0 mg/dL    Creatinine 0.75 0.51 - 0.95 mg/dL    GFR Estimate >90 >60 " mL/min/1.73m2    Calcium 9.5 8.8 - 10.4 mg/dL    Chloride 106 98 - 107 mmol/L    Glucose 128 (H) 70 - 99 mg/dL    Alkaline Phosphatase 81 40 - 150 U/L    AST 30 0 - 45 U/L    ALT 16 0 - 50 U/L    Protein Total 8.7 (H) 6.4 - 8.3 g/dL    Albumin 4.9 3.5 - 5.2 g/dL    Bilirubin Total 0.6 <=1.2 mg/dL   Lipase     Status: Normal   Result Value Ref Range    Lipase 29 13 - 60 U/L   UA with Microscopic reflex to Culture     Status: Abnormal    Specimen: Urine, Midstream   Result Value Ref Range    Color Urine Yellow Colorless, Straw, Light Yellow, Yellow    Appearance Urine Clear Clear    Glucose Urine Negative Negative mg/dL    Bilirubin Urine Negative Negative    Ketones Urine 60 (A) Negative mg/dL    Specific Gravity Urine 1.025 1.003 - 1.035    Blood Urine Negative Negative    pH Urine 6.0 5.0 - 7.0    Protein Albumin Urine 70 (A) Negative mg/dL    Urobilinogen Urine 2.0 Normal, 2.0 mg/dL    Nitrite Urine Negative Negative    Leukocyte Esterase Urine Negative Negative    Mucus Urine Present (A) None Seen /LPF    RBC Urine 1 <=2 /HPF    WBC Urine 2 <=5 /HPF    Squamous Epithelials Urine 1 <=1 /HPF    Narrative    Urine Culture not indicated   CBC with platelets and differential     Status: Abnormal   Result Value Ref Range    WBC Count 7.9 4.0 - 11.0 10e3/uL    RBC Count 5.13 3.80 - 5.20 10e6/uL    Hemoglobin 11.5 (L) 11.7 - 15.7 g/dL    Hematocrit 37.0 35.0 - 47.0 %    MCV 72 (L) 78 - 100 fL    MCH 22.4 (L) 26.5 - 33.0 pg    MCHC 31.1 (L) 31.5 - 36.5 g/dL    RDW 19.9 (H) 10.0 - 15.0 %    Platelet Count 561 (H) 150 - 450 10e3/uL    % Neutrophils 72 %    % Lymphocytes 24 %    % Monocytes 4 %    % Eosinophils 0 %    % Basophils 0 %    % Immature Granulocytes 0 %    NRBCs per 100 WBC 0 <1 /100    Absolute Neutrophils 5.7 1.6 - 8.3 10e3/uL    Absolute Lymphocytes 1.9 0.8 - 5.3 10e3/uL    Absolute Monocytes 0.3 0.0 - 1.3 10e3/uL    Absolute Eosinophils 0.0 0.0 - 0.7 10e3/uL    Absolute Basophils 0.0 0.0 - 0.2 10e3/uL     Absolute Immature Granulocytes 0.0 <=0.4 10e3/uL    Absolute NRBCs 0.0 10e3/uL   HCG qualitative urine     Status: Normal   Result Value Ref Range    hCG Urine Qualitative Negative Negative   Troponin T, High Sensitivity     Status: Normal   Result Value Ref Range    Troponin T, High Sensitivity <6 <=14 ng/L   Urine Drug Screen Panel     Status: Abnormal   Result Value Ref Range    Amphetamines Urine Screen Negative Screen Negative    Barbituates Urine Screen Negative Screen Negative    Benzodiazepine Urine Screen Negative Screen Negative    Cannabinoids Urine Screen Positive (A) Screen Negative    Cocaine Urine Screen Negative Screen Negative    Fentanyl Qual Urine Screen Negative Screen Negative    Opiates Urine Screen Negative Screen Negative    PCP Urine Screen Negative Screen Negative   Magnesium     Status: Normal   Result Value Ref Range    Magnesium 1.9 1.7 - 2.3 mg/dL   CBC with Platelets & Differential     Status: Abnormal    Narrative    The following orders were created for panel order CBC with Platelets & Differential.  Procedure                               Abnormality         Status                     ---------                               -----------         ------                     CBC with platelets and d...[[id number]]  Abnormal            Final result                 Please view results for these tests on the individual orders.   Urine Drug Screen     Status: Abnormal    Narrative    The following orders were created for panel order Urine Drug Screen.  Procedure                               Abnormality         Status                     ---------                               -----------         ------                     Urine Drug Screen Panel[[id number]]      Abnormal            Final result                 Please view results for these tests on the individual orders.     Medications   pantoprazole (PROTONIX) IV push injection 40 mg (40 mg Intravenous $Given 9/11/24 5373)   OLANZapine  (zyPREXA) injection 5 mg (5 mg Intramuscular $Given 9/11/24 2211)   sodium chloride 0.9% BOLUS 1,000 mL (0 mLs Intravenous Stopped 9/11/24 2310)   alum & mag hydroxide-simethicone (MAALOX) suspension 15 mL (15 mLs Oral $Given 9/12/24 0021)   lidocaine (viscous) (XYLOCAINE) 2 % solution 10 mL (10 mLs Mouth/Throat $Given 9/12/24 0020)   lidocaine (viscous) (XYLOCAINE) 2 % solution (  Not Given 9/12/24 0043)     Labs Ordered and Resulted from Time of ED Arrival to Time of ED Departure   COMPREHENSIVE METABOLIC PANEL - Abnormal       Result Value    Sodium 145      Potassium 3.9      Carbon Dioxide (CO2) 21 (*)     Anion Gap 18 (*)     Urea Nitrogen 13.3      Creatinine 0.75      GFR Estimate >90      Calcium 9.5      Chloride 106      Glucose 128 (*)     Alkaline Phosphatase 81      AST 30      ALT 16      Protein Total 8.7 (*)     Albumin 4.9      Bilirubin Total 0.6     ROUTINE UA WITH MICROSCOPIC REFLEX TO CULTURE - Abnormal    Color Urine Yellow      Appearance Urine Clear      Glucose Urine Negative      Bilirubin Urine Negative      Ketones Urine 60 (*)     Specific Gravity Urine 1.025      Blood Urine Negative      pH Urine 6.0      Protein Albumin Urine 70 (*)     Urobilinogen Urine 2.0      Nitrite Urine Negative      Leukocyte Esterase Urine Negative      Mucus Urine Present (*)     RBC Urine 1      WBC Urine 2      Squamous Epithelials Urine 1     CBC WITH PLATELETS AND DIFFERENTIAL - Abnormal    WBC Count 7.9      RBC Count 5.13      Hemoglobin 11.5 (*)     Hematocrit 37.0      MCV 72 (*)     MCH 22.4 (*)     MCHC 31.1 (*)     RDW 19.9 (*)     Platelet Count 561 (*)     % Neutrophils 72      % Lymphocytes 24      % Monocytes 4      % Eosinophils 0      % Basophils 0      % Immature Granulocytes 0      NRBCs per 100 WBC 0      Absolute Neutrophils 5.7      Absolute Lymphocytes 1.9      Absolute Monocytes 0.3      Absolute Eosinophils 0.0      Absolute Basophils 0.0      Absolute Immature Granulocytes 0.0       Absolute NRBCs 0.0     URINE DRUG SCREEN PANEL - Abnormal    Amphetamines Urine Screen Negative      Barbituates Urine Screen Negative      Benzodiazepine Urine Screen Negative      Cannabinoids Urine Screen Positive (*)     Cocaine Urine Screen Negative      Fentanyl Qual Urine Screen Negative      Opiates Urine Screen Negative      PCP Urine Screen Negative     LIPASE - Normal    Lipase 29     HCG QUALITATIVE URINE - Normal    hCG Urine Qualitative Negative     TROPONIN T, HIGH SENSITIVITY - Normal    Troponin T, High Sensitivity <6     MAGNESIUM - Normal    Magnesium 1.9     URINE CULTURE     US Abdomen Limited    (Results Pending)          Critical care was not performed.     Medical Decision Making  The patient's presentation was of high complexity (an acute health issue posing potential threat to life or bodily function).    The patient's evaluation involved:  review of external note(s) from 1+ sources (see separate area of note for details)  review of 3+ test result(s) ordered prior to this encounter (see separate area of note for details)  ordering and/or review of 3+ test(s) in this encounter (see separate area of note for details)    The patient's management necessitated high risk (a decision regarding hospitalization) and further care after sign-out to Dr. Snowden (see their note for further management).    Assessment & Plan    Hector is a 18-year-old female who presents with nausea vomiting and also complaining of chest pain from her epigastrium up to her neck most consistent with GERD.  She endorses shortness of breath associated with symptoms.    On exam she is uncomfortable appearing, dry heaving.  Has a diffusely tender abdomen but no guarding or peritoneal signs.  Patient reports similar presentation 2 weeks ago at an outside hospital, she reports negative evaluation including CT scan of abdomen and chest x-ray currently working to confirm this.    Differential could include things such as  gastritis, SBO, pregnancy, ectopic pregnancy, UTI, some type of cyclic vomiting syndrome.  Less likely ACS; given chest pain and shortness of breath I did obtain an EKG which was nonischemic and she has normal troponin.  I have low suspicion for things like esophageal perforation excetra given duration of symptoms and overall well appearance and normal vital signs despite multiple days of symptoms.     Will start with laboratory studies symptomatic management and reassessment.  Labs are all reassuring; pregnancy test negative.  Electrolytes reassuring normal lipase normal white count no left shift hemoglobin baseline.    Patient got Protonix, Zyprexa.  Noted nausea was improved but still had her burning epigastric pain.    Was able to obtain outside records at that time patient had a CTA dissection of chest abdomen and pelvis that showed no PE, dissection or other acute findings in the abdomen or pelvis.  Given that she just recently had imaging and presents with repeat symptoms but reassuring labs and vital signs I do not believe it is in her best interest to repeat imaging at this time.  There was some fluid noted around the gallbladder.  Does not appear that they proceed with gallbladder ultrasound was suggested today.  Was signed out to  who will follow-up on reassessment and gallbladder imaging.    On reassessment patient is comfortable appearing.  Would anticipate she would likely go with negative imaging she already has GI follow-up scheduled will send home with omeprazole.      I have reviewed the nursing notes. I have reviewed the findings, diagnosis, plan and need for follow up with the patient.    New Prescriptions    OMEPRAZOLE (PRILOSEC) 20 MG DR CAPSULE    Take 2 capsules (40 mg) by mouth daily.       Final diagnoses:   Nausea and vomiting, unspecified vomiting type   Abdominal pain, unspecified abdominal location   Prolonged Q-T interval on ECG       ITina, am serving as a  trained medical scribe to document services personally performed by Rex Cabrales MD, based on the provider's statements to me.     I, Rex Cabrales MD, was physically present and have reviewed and verified the accuracy of this note documented by Tina Rae.   [facility] EMERGENCY DEPARTMENT  9/11/2024     Rex Cabrales MD  09/12/24 0153

## 2024-09-12 NOTE — TELEPHONE ENCOUNTER
Patient discharged at 6 am this morning. Patient has been unable to keep down medication and pain is back to 9/10. Vomiting started again right after taking medication at 10 am and patient has vomited 4 times. The abdominal pain is more in the middle to the right. Patient feels like she has something stuck in her throat. Patient is gagging but nothing comes out as it just feels like it is stuck. Patient also having pain in her chest. Chest pain 7/10.   Patient is sweaty and cold but just finished vomiting right before the call. Patient is feeling lightheaded and has history of blood clot.   Protocol recommends ED now  Care advice given. Patient will have another adult drive to ED now  Allison Mejia RN   09/12/24 7:11 PM  [facility] Nurse Advisor          Reason for Disposition   Chest pain   Dizziness or lightheadedness    Additional Information   Negative: Shock suspected (e.g., cold/pale/clammy skin, too weak to stand, low BP, rapid pulse)   Negative: Difficult to awaken or acting confused (e.g., disoriented, slurred speech)   Negative: Sounds like a life-threatening emergency to the triager   Negative: Vomiting occurs only while coughing   Negative: [1] Pregnant < 20 Weeks AND [2] nausea/vomiting began in early pregnancy (i.e., 4-8 weeks pregnant)   Negative: SEVERE difficulty breathing (e.g., struggling for each breath, speaks in single words)   Negative: Difficult to awaken or acting confused (e.g., disoriented, slurred speech)   Negative: Shock suspected (e.g., cold/pale/clammy skin, too weak to stand, low BP, rapid pulse)   Negative: Passed out (i.e., lost consciousness, collapsed and was not responding)   Negative: Chest pain lasting longer than 5 minutes and ANY of the following:    history of heart disease  (i.e., heart attack, bypass surgery, angina, angioplasty, CHF; not just a heart murmur)    described as crushing, pressure-like, or heavy    age > 50    age > 30 AND at least one cardiac risk  "factor (i.e., hypertension, diabetes, obesity, smoker or strong family history of heart disease)    not relieved with nitroglycerin   Negative: Heart beating < 50 beats per minute OR > 140 beats per minute   Negative: Visible sweat on face or sweat dripping down face   Negative: Sounds like a life-threatening emergency to the triager   Negative: Followed a chest injury   Negative: SEVERE chest pain   Negative: [1] Chest pain (or \"angina\") comes and goes AND [2] is happening more often (increasing in frequency) or getting worse (increasing in severity)  (Exception: Chest pains that last only a few seconds.)   Negative: Pain also in shoulder(s) or arm(s) or jaw  (Exception: Pain is clearly made worse by movement.)   Negative: Difficulty breathing    Protocols used: Vomiting-A-AH, Chest Pain-A-AH    "

## 2024-09-12 NOTE — ED NOTES
Went to give patient discharge paperwork but patient is throwing up again, patient's friends gave her some crackers to try but this invoked the nausea again.

## 2024-09-12 NOTE — ED TRIAGE NOTES
Reports epigastric pain and abd pain started yesterday 9AM. Was seen at UC was given meds with no improvement.  referred pt to come to the ED.

## 2024-09-12 NOTE — DISCHARGE INSTRUCTIONS
Please follow-up with GI as planned.  Take your omeprazole every night.  Return to the ER for new or worsening symptoms.  Please stop smoking marijuana as this is likely causing your recurrent episodes of vomiting and if you keep doing marijuana you will keep having episodes of vomiting and pain.  You need to stop for at least a few months before the symptoms go away completely as the THC dissolves in your body.

## 2024-09-13 ENCOUNTER — OFFICE VISIT (OUTPATIENT)
Dept: INTERNAL MEDICINE | Facility: CLINIC | Age: 19
End: 2024-09-13
Payer: COMMERCIAL

## 2024-09-13 VITALS
DIASTOLIC BLOOD PRESSURE: 96 MMHG | HEIGHT: 69 IN | WEIGHT: 245 LBS | OXYGEN SATURATION: 100 % | TEMPERATURE: 98.4 F | SYSTOLIC BLOOD PRESSURE: 144 MMHG | HEART RATE: 57 BPM | RESPIRATION RATE: 18 BRPM | BODY MASS INDEX: 36.29 KG/M2

## 2024-09-13 DIAGNOSIS — D50.9 MICROCYTIC ANEMIA: ICD-10-CM

## 2024-09-13 DIAGNOSIS — F12.90 CANNABINOID HYPEREMESIS SYNDROME: Primary | ICD-10-CM

## 2024-09-13 DIAGNOSIS — R11.2 CANNABINOID HYPEREMESIS SYNDROME: Primary | ICD-10-CM

## 2024-09-13 DIAGNOSIS — K81.0 ACUTE CHOLECYSTITIS: ICD-10-CM

## 2024-09-13 DIAGNOSIS — K52.9 GASTROENTERITIS: ICD-10-CM

## 2024-09-13 DIAGNOSIS — N83.201 CYSTS OF BOTH OVARIES: ICD-10-CM

## 2024-09-13 DIAGNOSIS — K27.9 PUD (PEPTIC ULCER DISEASE): ICD-10-CM

## 2024-09-13 DIAGNOSIS — K56.609 SMALL BOWEL OBSTRUCTION (H): ICD-10-CM

## 2024-09-13 DIAGNOSIS — N83.202 CYSTS OF BOTH OVARIES: ICD-10-CM

## 2024-09-13 DIAGNOSIS — D56.3 BETA THALASSEMIA MINOR: ICD-10-CM

## 2024-09-13 DIAGNOSIS — K29.00 ACUTE GASTRITIS WITHOUT HEMORRHAGE, UNSPECIFIED GASTRITIS TYPE: ICD-10-CM

## 2024-09-13 LAB
IRON BINDING CAPACITY (ROCHE): 417 UG/DL (ref 240–430)
IRON SATN MFR SERPL: 4 % (ref 15–46)
IRON SERPL-MCNC: 18 UG/DL (ref 37–145)

## 2024-09-13 PROCEDURE — 99214 OFFICE O/P EST MOD 30 MIN: CPT

## 2024-09-13 PROCEDURE — G2211 COMPLEX E/M VISIT ADD ON: HCPCS

## 2024-09-13 RX ORDER — SUCRALFATE ORAL 1 G/10ML
1 SUSPENSION ORAL 4 TIMES DAILY
Qty: 473 ML | Refills: 0 | Status: SHIPPED | OUTPATIENT
Start: 2024-09-13

## 2024-09-13 ASSESSMENT — PATIENT HEALTH QUESTIONNAIRE - PHQ9
10. IF YOU CHECKED OFF ANY PROBLEMS, HOW DIFFICULT HAVE THESE PROBLEMS MADE IT FOR YOU TO DO YOUR WORK, TAKE CARE OF THINGS AT HOME, OR GET ALONG WITH OTHER PEOPLE: NOT DIFFICULT AT ALL
SUM OF ALL RESPONSES TO PHQ QUESTIONS 1-9: 6
SUM OF ALL RESPONSES TO PHQ QUESTIONS 1-9: 6

## 2024-09-13 NOTE — PROGRESS NOTES
Assessment & Plan     (R11.2,  F12.90) Cannabinoid hyperemesis syndrome  (primary encounter diagnosis)  Comment: Patient admits to once a week cannabinoid use and it was found in her urine sample she provided.  Pt's response and expression answered my question when I suggested this diagnosis and she implored that her mother would not find out  Plan: Provided literature about cannabinoid hyperemesis syndrome and explained that she needs to cease using it    (K52.9) Gastroenteritis  Comment: Patient's last food intake was 9/9/2024 of noodles that evening. Ever since that meal she has been vomiting and having watery stools  Plan: Adult GI  Referral - Consult Only,         Enteric Bacteria and Virus Panel by [facility] Stool        Patient is to provide a stool specimen    (K29.00) Acute gastritis without hemorrhage, unspecified gastritis type  Comment: Patient has discomfort in the epigastric area with persistent vomiting  Plan: Patient is to take sucralfate and omeprazole to calm down the acidity in her stomach so she is able to tolerate fluids and foods by mouth without vomiting    (K27.9) PUD (peptic ulcer disease)  Comment: Patient's discomfort is primarily in the epigastric area  Plan: sucralfate (CARAFATE) 1 GM/10ML suspension,         Adult GI  Referral - Consult Only,         Helicobacter pylori Antigen Stool        Patient is to provide a stool sample to test for H. pylori    (K56.609) Small bowel obstruction (H)  Comment: Pt had reported multiple episodes of watery diarrhea over the past few days. Pt reports her bowel routine is once every 2-3 days, sometimes only weekly.    Plan: ordered Xray of abd     (D56.3) Beta thalassemia minor  Comment: Patient's hemogram reveals a lower hemoglobin with elevated red blood cell count and elevated RDW  Plan: Assessing for beta thalassemia minor    (D50.9) Microcytic anemia  Comment: Patient's MCV was 72.7 and RDW 19.4  Plan: Iron and iron binding capacity    "  Iron is 18 and iron saturation was 4, test applied to recent blood specimen provided    (K81.0) Acute cholecystitis  Comment: Patient demonstrates a positive Sheets sign at the right upper quadrant of her abdomen  Plan: Patient has an elevated AST and ALT.however, ultrasound only showed biliary sludge but no findings of cholecystitis or cholelithiasis     (N83.201,  N83.202) Cysts of both ovaries  Comment: Patient stated that her menses ended at the end of last month, patient is experiencing pain in the lower right and left quadrant of her abdomen, patient would be ovulating at this time  Plan: Ultrasound showed that the right ovary was unremarkable with no adnexal masses suspected.  BMI  Estimated body mass index is 36.18 kg/m  as calculated from the following:    Height as of this encounter: 1.753 m (5' 9\").    Weight as of this encounter: 111.1 kg (245 lb).              Lance Young is a 18 year old, presenting for the following health issues: Pt just came from [facility]  ED with N&V that consists solely of bile. Their impression of her condition felt her sx were mostly suggestive of gastritis. Their suggestion was to continue the omeprazole, eat very bland foods, continue to use her ODT Zofran, and prescribed some backup Compazine suppositories to use if she is not having any relief with Zofran.     Pt also went to the ED 2 days ago at [facility]. Lab tests from this ED visits consist of CMP that shows a potassium of 3.4 probably due to the hyperemesis. She also had an elevated AST (102) and ALT (88). Lipase was 33. Pregnancy test was negative. CBC showed an elevated RBC (5.12), Hgg (11.3), MCV was 72.7 with an RDW of 19.4 with elevated platelets at 504, so pt has a microcytic anemia and possible beta thalassemia.    Pt states that since 9/10/2024, pt has been throwing up and hasn't had an appetite. Pt reports that her last meal she ate was Monday night 9/9/2024. Pt states that she is unable " to tolerate anything by mouth without throwing it back up. Emesis consists of solely of bile. Pt produced an emesis 6 times during our visit. Pt denies any fevers, night sweats or chills. Pt states that throwing up makes her feel better. Zofran that was prescribed for nausea provides very little relief.      Pt had reported multiple episodes of watery diarrhea over the past few days. Pt reports her bowel routine is once every 2-3 days, sometimes only weekly. Pt denies any hematochezia. Pt reports that her last stool was 9/10/2024 that was loose. The ED visit today did not have the impression of a possible bowel obstruction.  Ordered an abdominal X-ray to be sure. Tried calling pt and there was no answer.     Pt describes generalized diffuse pain concentrated at the suprapubic and epigastric region. Pt also presents with a positive Sheets sign at the RUQ. Ultrasound demonstrated biliary sludge but no findings of cholecystitis or cholelithiasis.     Pt reports that eating and different smells makes it worse. Pt reports that she has dysmenorrhea. Pt reported just finishing her menstrual cycle at the end of last month. Ultrasound showed that the right and left ovaries were unremarkable with no adnexal masses suspected. HCG test was negative.    Pt denies any dysuria no hematuria. During her ED visit on 9/11/2024 she provided a urine sample that was positive for cannabinoids and negative for UTI. Pt admits to cannabinoid use at least weekly.      Explained to patient to take the sucralfate and then shortly after take the omeprazole, acid production down so she can tolerate liquids by mouth.  Instructed patient to start with a clear liquid diet with small sips or ice chips, trying to tolerate Gatorade to correct any potential electrolyte balance.  And then slowly progress her diet to soft and bland foods or anything that she could possibly tolerate.  Told patient to go down to the lab to access the kits for providing a  "stool sample to check for H. pylori as well as any bacterial, parasitic, or fungal infection in her colon.  Discussed with patient cannabinoid hyperemesis syndrome and provided literature about it and talked about her ceasing the use of Cannabinoid products.    RECHECK (ER follow up.)      9/13/2024     4:01 PM   Additional Questions   Roomed by Deepali Driver MA   Accompanied by Her Friend     History of Present Illness       Reason for visit:  Severe stomach pain  Symptom onset:  1-3 days ago  Symptom intensity:  Severe  Symptom progression:  Staying the same  What makes it worse:  Food  What makes it better:  Fresh air and vommitibg   She is taking medications regularly.         ED/UC Followup:    Facility:  [facility]  Date of visit: 9/13/24  Reason for visit: Nausea, vomiting, abdominal pain  Current Status: Not improving        Review of Systems  Constitutional, HEENT, cardiovascular, pulmonary, gi and gu systems are negative, except as otherwise noted.      Objective    BP (!) 144/96 (BP Location: Left arm, Patient Position: Sitting, Cuff Size: Adult Large)   Pulse 57   Temp 98.4  F (36.9  C) (Oral)   Resp 18   Ht 1.753 m (5' 9\")   Wt 111.1 kg (245 lb)   SpO2 100%   BMI 36.18 kg/m    Body mass index is 36.18 kg/m .  Physical Exam   GENERAL: alert and no distress  NECK: no adenopathy, no asymmetry, masses, or scars  RESP: lungs clear to auscultation - no rales, rhonchi or wheezes  CV: regular rate and rhythm, normal S1 S2, no S3 or S4, no murmur, click or rub, no peripheral edema  ABDOMEN: tenderness epigastric, RUQ, and suprapubic, bowel sounds hypoactive, no palpable or pulsatile masses, and no bruits heard  MS: no gross musculoskeletal defects noted, no edema  SKIN: no suspicious lesions or rashes  NEURO: Normal strength and tone, mentation intact and speech normal  PSYCH: mentation appears normal, affect normal/bright            Signed Electronically by: UZIEL Swanson " CNP

## 2024-09-13 NOTE — PATIENT INSTRUCTIONS
Take the sucralfate and then take the omeprazole shortly afterwards to calm the acid production down, so you can tolerate.    Clear liquid diet, try gatorade for electrolyte imbalance.

## 2024-09-15 ENCOUNTER — APPOINTMENT (OUTPATIENT)
Dept: CT IMAGING | Facility: CLINIC | Age: 19
End: 2024-09-15
Attending: EMERGENCY MEDICINE
Payer: COMMERCIAL

## 2024-09-15 ENCOUNTER — NURSE TRIAGE (OUTPATIENT)
Dept: NURSING | Facility: CLINIC | Age: 19
End: 2024-09-15
Payer: COMMERCIAL

## 2024-09-15 ENCOUNTER — HOSPITAL ENCOUNTER (OUTPATIENT)
Facility: CLINIC | Age: 19
Setting detail: OBSERVATION
Discharge: HOME | End: 2024-09-17
Attending: EMERGENCY MEDICINE | Admitting: INTERNAL MEDICINE
Payer: COMMERCIAL

## 2024-09-15 DIAGNOSIS — K29.00 ACUTE GASTRITIS WITHOUT HEMORRHAGE, UNSPECIFIED GASTRITIS TYPE: ICD-10-CM

## 2024-09-15 LAB
ALBUMIN SERPL BCG-MCNC: 4.5 G/DL (ref 3.5–5.2)
ALBUMIN UR-MCNC: 50 MG/DL
ALP SERPL-CCNC: 80 U/L (ref 40–150)
ALT SERPL W P-5'-P-CCNC: 127 U/L (ref 0–50)
AMPHETAMINES UR QL SCN: ABNORMAL
ANION GAP SERPL CALCULATED.3IONS-SCNC: 17 MMOL/L (ref 7–15)
APPEARANCE UR: CLEAR
AST SERPL W P-5'-P-CCNC: 69 U/L (ref 0–45)
BARBITURATES UR QL SCN: ABNORMAL
BASOPHILS # BLD AUTO: 0 10E3/UL (ref 0–0.2)
BASOPHILS NFR BLD AUTO: 0 %
BENZODIAZ UR QL SCN: ABNORMAL
BILIRUB SERPL-MCNC: 0.7 MG/DL
BILIRUB UR QL STRIP: ABNORMAL
BUN SERPL-MCNC: 12.2 MG/DL (ref 6–20)
BZE UR QL SCN: ABNORMAL
CALCIUM SERPL-MCNC: 8.8 MG/DL (ref 8.8–10.4)
CANNABINOIDS UR QL SCN: ABNORMAL
CHLORIDE SERPL-SCNC: 100 MMOL/L (ref 98–107)
COLOR UR AUTO: YELLOW
CREAT SERPL-MCNC: 0.65 MG/DL (ref 0.51–0.95)
EGFRCR SERPLBLD CKD-EPI 2021: >90 ML/MIN/1.73M2
EOSINOPHIL # BLD AUTO: 0 10E3/UL (ref 0–0.7)
EOSINOPHIL NFR BLD AUTO: 0 %
ERYTHROCYTE [DISTWIDTH] IN BLOOD BY AUTOMATED COUNT: 19.5 % (ref 10–15)
FENTANYL UR QL: ABNORMAL
GLUCOSE SERPL-MCNC: 74 MG/DL (ref 70–99)
GLUCOSE UR STRIP-MCNC: NEGATIVE MG/DL
HCG SERPL QL: NEGATIVE
HCO3 SERPL-SCNC: 21 MMOL/L (ref 22–29)
HCT VFR BLD AUTO: 37.7 % (ref 35–47)
HGB BLD-MCNC: 11.3 G/DL (ref 11.7–15.7)
HGB UR QL STRIP: NEGATIVE
HOLD SPECIMEN: NORMAL
IMM GRANULOCYTES # BLD: 0 10E3/UL
IMM GRANULOCYTES NFR BLD: 0 %
KETONES UR STRIP-MCNC: >150 MG/DL
LEUKOCYTE ESTERASE UR QL STRIP: NEGATIVE
LIPASE SERPL-CCNC: 44 U/L (ref 13–60)
LYMPHOCYTES # BLD AUTO: 2.2 10E3/UL (ref 0.8–5.3)
LYMPHOCYTES NFR BLD AUTO: 27 %
MCH RBC QN AUTO: 21.9 PG (ref 26.5–33)
MCHC RBC AUTO-ENTMCNC: 30 G/DL (ref 31.5–36.5)
MCV RBC AUTO: 73 FL (ref 78–100)
MONOCYTES # BLD AUTO: 0.5 10E3/UL (ref 0–1.3)
MONOCYTES NFR BLD AUTO: 7 %
MUCOUS THREADS #/AREA URNS LPF: PRESENT /LPF
NEUTROPHILS # BLD AUTO: 5.2 10E3/UL (ref 1.6–8.3)
NEUTROPHILS NFR BLD AUTO: 66 %
NITRATE UR QL: NEGATIVE
NRBC # BLD AUTO: 0 10E3/UL
NRBC BLD AUTO-RTO: 0 /100
OPIATES UR QL SCN: ABNORMAL
PCP QUAL URINE (ROCHE): ABNORMAL
PH UR STRIP: 6 [PH] (ref 5–7)
PLATELET # BLD AUTO: 456 10E3/UL (ref 150–450)
POTASSIUM SERPL-SCNC: 3.4 MMOL/L (ref 3.4–5.3)
PROT SERPL-MCNC: 7.5 G/DL (ref 6.4–8.3)
RBC # BLD AUTO: 5.15 10E6/UL (ref 3.8–5.2)
RBC URINE: 2 /HPF
SODIUM SERPL-SCNC: 138 MMOL/L (ref 135–145)
SP GR UR STRIP: 1.03 (ref 1–1.03)
SQUAMOUS EPITHELIAL: 4 /HPF
UROBILINOGEN UR STRIP-MCNC: 8 MG/DL
WBC # BLD AUTO: 8 10E3/UL (ref 4–11)
WBC URINE: 1 /HPF

## 2024-09-15 PROCEDURE — 81001 URINALYSIS AUTO W/SCOPE: CPT | Performed by: EMERGENCY MEDICINE

## 2024-09-15 PROCEDURE — 250N000013 HC RX MED GY IP 250 OP 250 PS 637: Performed by: INTERNAL MEDICINE

## 2024-09-15 PROCEDURE — 96375 TX/PRO/DX INJ NEW DRUG ADDON: CPT

## 2024-09-15 PROCEDURE — 84703 CHORIONIC GONADOTROPIN ASSAY: CPT | Performed by: EMERGENCY MEDICINE

## 2024-09-15 PROCEDURE — 250N000011 HC RX IP 250 OP 636: Performed by: EMERGENCY MEDICINE

## 2024-09-15 PROCEDURE — 80307 DRUG TEST PRSMV CHEM ANLYZR: CPT | Performed by: EMERGENCY MEDICINE

## 2024-09-15 PROCEDURE — 96361 HYDRATE IV INFUSION ADD-ON: CPT

## 2024-09-15 PROCEDURE — G0378 HOSPITAL OBSERVATION PER HR: HCPCS

## 2024-09-15 PROCEDURE — 96374 THER/PROPH/DIAG INJ IV PUSH: CPT

## 2024-09-15 PROCEDURE — 96376 TX/PRO/DX INJ SAME DRUG ADON: CPT

## 2024-09-15 PROCEDURE — 99222 1ST HOSP IP/OBS MODERATE 55: CPT | Mod: AI | Performed by: INTERNAL MEDICINE

## 2024-09-15 PROCEDURE — 74177 CT ABD & PELVIS W/CONTRAST: CPT

## 2024-09-15 PROCEDURE — 83690 ASSAY OF LIPASE: CPT | Performed by: EMERGENCY MEDICINE

## 2024-09-15 PROCEDURE — 250N000009 HC RX 250: Performed by: EMERGENCY MEDICINE

## 2024-09-15 PROCEDURE — 36415 COLL VENOUS BLD VENIPUNCTURE: CPT | Performed by: EMERGENCY MEDICINE

## 2024-09-15 PROCEDURE — 80053 COMPREHEN METABOLIC PANEL: CPT | Performed by: EMERGENCY MEDICINE

## 2024-09-15 PROCEDURE — 258N000003 HC RX IP 258 OP 636: Performed by: INTERNAL MEDICINE

## 2024-09-15 PROCEDURE — 80074 ACUTE HEPATITIS PANEL: CPT | Performed by: INTERNAL MEDICINE

## 2024-09-15 PROCEDURE — 250N000011 HC RX IP 250 OP 636: Performed by: INTERNAL MEDICINE

## 2024-09-15 PROCEDURE — 85025 COMPLETE CBC W/AUTO DIFF WBC: CPT | Performed by: EMERGENCY MEDICINE

## 2024-09-15 PROCEDURE — 99285 EMERGENCY DEPT VISIT HI MDM: CPT | Mod: 25

## 2024-09-15 PROCEDURE — 258N000003 HC RX IP 258 OP 636: Performed by: EMERGENCY MEDICINE

## 2024-09-15 RX ORDER — AMOXICILLIN 250 MG
2 CAPSULE ORAL 2 TIMES DAILY PRN
Status: DISCONTINUED | OUTPATIENT
Start: 2024-09-15 | End: 2024-09-17 | Stop reason: HOSPADM

## 2024-09-15 RX ORDER — SUCRALFATE ORAL 1 G/10ML
1 SUSPENSION ORAL 4 TIMES DAILY
Status: DISCONTINUED | OUTPATIENT
Start: 2024-09-15 | End: 2024-09-17 | Stop reason: HOSPADM

## 2024-09-15 RX ORDER — MORPHINE SULFATE 4 MG/ML
4 INJECTION, SOLUTION INTRAMUSCULAR; INTRAVENOUS ONCE
Status: COMPLETED | OUTPATIENT
Start: 2024-09-15 | End: 2024-09-15

## 2024-09-15 RX ORDER — AMOXICILLIN 250 MG
1 CAPSULE ORAL 2 TIMES DAILY PRN
Status: DISCONTINUED | OUTPATIENT
Start: 2024-09-15 | End: 2024-09-17 | Stop reason: HOSPADM

## 2024-09-15 RX ORDER — SODIUM CHLORIDE, SODIUM LACTATE, POTASSIUM CHLORIDE, CALCIUM CHLORIDE 600; 310; 30; 20 MG/100ML; MG/100ML; MG/100ML; MG/100ML
INJECTION, SOLUTION INTRAVENOUS CONTINUOUS
Status: DISCONTINUED | OUTPATIENT
Start: 2024-09-15 | End: 2024-09-16

## 2024-09-15 RX ORDER — ONDANSETRON 2 MG/ML
4 INJECTION INTRAMUSCULAR; INTRAVENOUS EVERY 30 MIN PRN
Status: DISCONTINUED | OUTPATIENT
Start: 2024-09-15 | End: 2024-09-15

## 2024-09-15 RX ORDER — DIPHENHYDRAMINE HYDROCHLORIDE 50 MG/ML
25 INJECTION INTRAMUSCULAR; INTRAVENOUS ONCE
Status: COMPLETED | OUTPATIENT
Start: 2024-09-15 | End: 2024-09-15

## 2024-09-15 RX ORDER — ACETAMINOPHEN 650 MG/1
650 SUPPOSITORY RECTAL EVERY 4 HOURS PRN
Status: DISCONTINUED | OUTPATIENT
Start: 2024-09-15 | End: 2024-09-17 | Stop reason: HOSPADM

## 2024-09-15 RX ORDER — IOPAMIDOL 755 MG/ML
100 INJECTION, SOLUTION INTRAVASCULAR ONCE
Status: COMPLETED | OUTPATIENT
Start: 2024-09-15 | End: 2024-09-15

## 2024-09-15 RX ORDER — ONDANSETRON 4 MG/1
4 TABLET, ORALLY DISINTEGRATING ORAL EVERY 6 HOURS PRN
Status: DISCONTINUED | OUTPATIENT
Start: 2024-09-15 | End: 2024-09-17 | Stop reason: HOSPADM

## 2024-09-15 RX ORDER — ONDANSETRON 2 MG/ML
8 INJECTION INTRAMUSCULAR; INTRAVENOUS EVERY 12 HOURS
Status: DISCONTINUED | OUTPATIENT
Start: 2024-09-15 | End: 2024-09-17

## 2024-09-15 RX ORDER — ACETAMINOPHEN 325 MG/1
650 TABLET ORAL EVERY 4 HOURS PRN
Status: DISCONTINUED | OUTPATIENT
Start: 2024-09-15 | End: 2024-09-17 | Stop reason: HOSPADM

## 2024-09-15 RX ORDER — ONDANSETRON 2 MG/ML
4 INJECTION INTRAMUSCULAR; INTRAVENOUS EVERY 6 HOURS PRN
Status: DISCONTINUED | OUTPATIENT
Start: 2024-09-15 | End: 2024-09-17 | Stop reason: HOSPADM

## 2024-09-15 RX ORDER — METOCLOPRAMIDE HYDROCHLORIDE 5 MG/ML
10 INJECTION INTRAMUSCULAR; INTRAVENOUS ONCE
Status: COMPLETED | OUTPATIENT
Start: 2024-09-15 | End: 2024-09-15

## 2024-09-15 RX ORDER — PROCHLORPERAZINE 25 MG
25 SUPPOSITORY, RECTAL RECTAL EVERY 12 HOURS PRN
Status: DISCONTINUED | OUTPATIENT
Start: 2024-09-15 | End: 2024-09-17 | Stop reason: HOSPADM

## 2024-09-15 RX ORDER — PROCHLORPERAZINE MALEATE 5 MG
10 TABLET ORAL EVERY 6 HOURS PRN
Status: DISCONTINUED | OUTPATIENT
Start: 2024-09-15 | End: 2024-09-17 | Stop reason: HOSPADM

## 2024-09-15 RX ORDER — POLYETHYLENE GLYCOL 3350 17 G/17G
17 POWDER, FOR SOLUTION ORAL 2 TIMES DAILY PRN
Status: DISCONTINUED | OUTPATIENT
Start: 2024-09-15 | End: 2024-09-17 | Stop reason: HOSPADM

## 2024-09-15 RX ADMIN — SODIUM CHLORIDE 65 ML: 9 INJECTION, SOLUTION INTRAVENOUS at 16:17

## 2024-09-15 RX ADMIN — SODIUM CHLORIDE, POTASSIUM CHLORIDE, SODIUM LACTATE AND CALCIUM CHLORIDE: 600; 310; 30; 20 INJECTION, SOLUTION INTRAVENOUS at 22:18

## 2024-09-15 RX ADMIN — DIPHENHYDRAMINE HYDROCHLORIDE 25 MG: 50 INJECTION, SOLUTION INTRAMUSCULAR; INTRAVENOUS at 12:44

## 2024-09-15 RX ADMIN — PANTOPRAZOLE SODIUM 80 MG: 40 INJECTION, POWDER, FOR SOLUTION INTRAVENOUS at 16:47

## 2024-09-15 RX ADMIN — MORPHINE SULFATE 4 MG: 4 INJECTION INTRAVENOUS at 16:47

## 2024-09-15 RX ADMIN — METOCLOPRAMIDE 10 MG: 5 INJECTION, SOLUTION INTRAMUSCULAR; INTRAVENOUS at 12:45

## 2024-09-15 RX ADMIN — SODIUM CHLORIDE 1000 ML: 9 INJECTION, SOLUTION INTRAVENOUS at 16:48

## 2024-09-15 RX ADMIN — ONDANSETRON 8 MG: 2 INJECTION INTRAMUSCULAR; INTRAVENOUS at 22:25

## 2024-09-15 RX ADMIN — IOPAMIDOL 100 ML: 755 INJECTION, SOLUTION INTRAVENOUS at 16:16

## 2024-09-15 RX ADMIN — SUCRALFATE ORAL 1 G: 1 SUSPENSION ORAL at 22:26

## 2024-09-15 RX ADMIN — ONDANSETRON 4 MG: 2 INJECTION INTRAMUSCULAR; INTRAVENOUS at 16:47

## 2024-09-15 RX ADMIN — SODIUM CHLORIDE 1000 ML: 9 INJECTION, SOLUTION INTRAVENOUS at 12:43

## 2024-09-15 ASSESSMENT — ACTIVITIES OF DAILY LIVING (ADL)
ADLS_ACUITY_SCORE: 35
ADLS_ACUITY_SCORE: 35
ADLS_ACUITY_SCORE: 33
ADLS_ACUITY_SCORE: 35
ADLS_ACUITY_SCORE: 19
ADLS_ACUITY_SCORE: 19
ADLS_ACUITY_SCORE: 35
ADLS_ACUITY_SCORE: 35
ADLS_ACUITY_SCORE: 19
ADLS_ACUITY_SCORE: 35

## 2024-09-15 NOTE — H&P
[facility]    History and Physical - Hospitalist Service       Date of Admission:  9/15/2024  Primary Care Physician   Physician No Ref-Primary  CONSULTANTS:  none    Assessment & Plan      Hector Mazariegos is a 18 year old female who has a history of obesity, anxiety, previous pulmonary embolus after breast reduction surgery, gastroesophageal reflux disease, presenting yet again with nausea and vomiting.  Patient has has had ongoing issues for a week, has had 3 ER visits and 2 UC visits.  She can't keep anyting down.  This started 5 days ago and had some diarrhea with it.  She denies THC use, though doing a tox screen was positive.  She has had GI GERD symptoms with oily and spicy foods. She has not had any sick contacts.  Denies fever, chills, sweats.  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  In the ER, given antiemtics and fluids. Her lytes are normal with a normal creatinine, lfts up slightly wbc normal.  She is not pregnant with a negative HCG, urine analysis negative.  She had a CT of the abdomin which was normal. Will admit to observation.  Needs ongoing THC cessation discussions.         Hyperemesis relate likely to THC use  Patient with ongoing emesis for a week with multiple ER/UC visits.  Denied THC use but tox screen that I requested came back positive. I have not had cessation talks with her myself as it came back later.  Placed on IV fluids, scheduled zofran, and compazine as needed.  Obviously needs to stay off THC.  I doubt this is a viral gastritis.   Addendum:  patient admits to [facility].  I did talk about cessation and that she can never use THC again.      GERD  Patient's mom with history of H pylori, will check breath test as she has not been stooling. Continue on proton pump inhibitor and carafrate.   Oily and spicey foods really affect her.     Elevated lfts  , AST 69, bili normal.  Will recheck in the am.  Given ongoing symptoms above want to rule out hepatitis too, viral  "hepatitis panel ordered.     anxiety  Home lexapro on hold    History pulmonary embolus, remote  Provoked at time of her breast reduction.  Not on blood thinners    Morbidly obese  BMI of 36, complicates her cares.    Ovarian cyst  Seen on CT and not symptomatic       Discussed plan of care with Dr Perez in the emergency room   Diet:  regular  DVT Prophylaxis: Low Risk/Ambulatory with no VTE prophylaxis indicated  Hackett Catheter: Not present  Lines: None     Cardiac Monitoring: None    RESTRAINTS: not indicated  Code Status:  presumed FULL         This document was created using voice recognition technology.  Please excuse any typographical errors that may have occurred.  Please call with any questions.                       # Obesity: Estimated body mass index is 35.84 kg/m  as calculated from the following:    Height as of this encounter: 1.753 m (5' 9\").    Weight as of this encounter: 110.1 kg (242 lb 11.6 oz).                    Disposition Plan      Expected Discharge Date: 09/16/2024                  Tye Moore MD  Hospitalist Service  [facility]  Securely message with WebMD (more info)  Text page via [facility] Paging/Directory     Medically Ready for Discharge: Anticipated Tomorrow      Length of stay: Anticipate less than 2 midnight hospitalization for evaluation and treatment of hyperemesis          ______________________________________________________________________    Chief Complaint   Intractable nausea/vomiting    History is obtained from the patient  Epic reviewed    History of Present Illness   Hector Mazariegos is a 18 year old female who has a history of obesity, anxiety, previous pulmonary embolus after breast reduction surgery, gastroesophageal reflux disease, presenting yet again with nausea and vomiting.  Patient has has had ongoing issues for a week, has had 3 ER visits and 2 UC visits.  She can't keep anyting down.  This started 5 days ago and had some diarrhea " with it.  She denies THC use, though doing a tox screen was positive.  She has had GI GERD symptoms with oily and spicy foods. She has not had any sick contacts.  Denies fever, chills, sweats.  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  In the ER, given antiemtics and fluids. Her lytes are normal with a normal creatinine, lfts up slightly wbc normal.  She is not pregnant with a negative HCG, urine analysis negative.  She had a CT of the abdomin which was normal. Will admit to observation.  Needs ongoing THC cessation discussions.       Past Medical History    No past medical history on file.    Past Surgical History   No past surgical history on file.    Prior to Admission Medications  pharm D reconciled  Prior to Admission Medications   Prescriptions Last Dose Informant Patient Reported? Taking?   azelastine (ASTELIN) 0.1 % nasal spray   No No   Sig: Spray 2 sprays into both nostrils 2 times daily   clindamycin (CLEOCIN T) 1 % SWAB   Yes No   Sig: WIPE FACE EVERY MORNING FOR ACNE   omeprazole (PRILOSEC) 20 MG DR capsule   No No   Sig: Take 2 capsules (40 mg) by mouth daily.   sucralfate (CARAFATE) 1 GM/10ML suspension   No No   Sig: Take 10 mLs (1 g) by mouth 4 times daily.      Facility-Administered Medications Last Administration Doses Remaining   sodium chloride (PF) 0.9% PF flush 3 mL 2/15/2023 10:29 AM            Allergies   No Known Allergies     REVIEW OF SYSTEMS:  A comprehensive review of systems was negative except for items noted in the HPI.  Patient currently denies any fever, chills, sweats,  diarrhea, shortness of breath, or chest pain.        Physical Exam   Vital Signs: Temp: 98.3  F (36.8  C) Temp src: Temporal BP: (!) 146/70 Pulse: 54   Resp: 18 SpO2: 100 % O2 Device: None (Room air)    Weight: 242 lbs 11.62 oz    General appearance: Patient is alert and oriented x3, no apparent distress, pleasant and conversing normally, speaking in full sentences, appears stated age  HEENT:  Atraumatic/normal cephalic, EOMI,  Pupils equally round and reactive to light, sclera non-icteric, Mucous membranes are moist  NECK:  supple without bruit or lymphadenopathy  RESPIRATORY: Clear to auscultation bilateral, good air movement  CARDIOVASCULAR: Regular rate and rhythm, normal S1/S2, no murmurs, rubs, or gallops present, peripheral pulses intact  GASTROINTESTINAL: Non-distended, non-tender, soft, bowel sounds present throughout, no mass or hepatosplenomegaly, obese  MUSCULOSKELETAL: without deformity, normal range of motion  SKIN:  Warm, dry, no rashes, no mottling  NEUROLOGIC:  Cranial nerves II-XII intact, without any focal deficits, strength 5/5 throughout  EXTREMITIES:  Moves all extremities, no clubbing, cyanosis, nor edema  :  Hackett not present         Data     I have personally reviewed the following data over the past 24 hrs:    8.0  \   11.3 (L)   / 456 (H)     138 100 12.2 /  74   3.4 21 (L) 0.65 \     ALT: 127 (H) AST: 69 (H) AP: 80 TBILI: 0.7   ALB: 4.5 TOT PROTEIN: 7.5 LIPASE: 44       Imaging:   Results for orders placed or performed during the hospital encounter of 09/15/24   CT Abdomen Pelvis w Contrast    Narrative    EXAM: CT ABDOMEN PELVIS W CONTRAST  LOCATION: [facility]  DATE: 9/15/2024    INDICATION: Epigastric abdominal pain.  COMPARISON: None.  TECHNIQUE: CT scan of the abdomen and pelvis was performed following injection of IV contrast. Multiplanar reformats were obtained. Dose reduction techniques were used.  CONTRAST: 100 mL Isovue 370.    FINDINGS:   LOWER CHEST: Normal.    HEPATOBILIARY: No acute abnormality. Fatty deposition at the anterior left liver. No calcified gallstones.    PANCREAS: Normal.    SPLEEN: Normal.    ADRENAL GLANDS: Normal.    KIDNEYS/BLADDER: Normal.    BOWEL: No obstruction or acute inflammatory change. Normal appendix. No abscess or free air.    LYMPH NODES: Normal.    VASCULATURE: Unremarkable.    PELVIC ORGANS: 4.4 cm left ovarian/adnexal cyst, image 170. Trace  pelvic fluid may be physiologic.    MUSCULOSKELETAL: Normal.      Impression    IMPRESSION:   1.  Left ovarian/adnexal 4.4 cm cyst.  2.  No acute abnormality otherwise seen.      I have personally have reviewed the patient's most up to date radiologic exams,  abs, orders, and medications myself

## 2024-09-15 NOTE — LETTER
[facility]  [address] [place] [address]  Phone: [phone]  Fax: [phone]    September 17, 2024        Hector Mazariegos  [address]  [place] [address]          To whom it may concern:    RE: Hector Mazariegos    Ms Mazariegos was hospitalized at this facility  9/15-9/17/2024 for an acute medical process.  She can return to work without restrictions on 9/18/2024    .      Sincerely,  Allison Olson MD  Formerly [facility] hospitalist

## 2024-09-15 NOTE — TELEPHONE ENCOUNTER
"Nurse Triage SBAR    Is this a 2nd Level Triage? NO    Situation: Pt calling with abdominal pain and chest pain    Background: Pt has been seen multiple times per her report in the ED, [facility] and clinic for her sx, but today her abdominal pain is worse    Assessment: Pt states that her abdominal pain is rated 8.5-9/10 and she has chest pain that feels like \"pressure\" and burning. She states she can not keep anything down including the sucralfate prescribed. She is suppose to submit a stool sample, but has not been able to because she has not eaten anything recently and has not had any bowel movement.     Protocol Recommended Disposition:   Call  Now, See More Appropriate Guideline    Recommendation: 911 due to chest pain described as \"pressure\" lasting longer than five minutes. Pt is with her sister. Consent received to speak to her sister today. Pt states that she might have her sister drive her even though writer has given the recommendation to call 911.      Reason for Disposition   Chest pain   Chest pain lasting longer than 5 minutes and ANY of the following:    history of heart disease  (i.e., heart attack, bypass surgery, angina, angioplasty, CHF; not just a heart murmur)    described as crushing, pressure-like, or heavy    age > 50    age > 30 AND at least one cardiac risk factor (i.e., hypertension, diabetes, obesity, smoker or strong family history of heart disease)    not relieved with nitroglycerin    Additional Information   Negative: Shock suspected (very weak, limp, not moving, pale cool skin, etc)   Negative: Sounds like a life-threatening emergency to the triager   Negative: Shock suspected (e.g., cold/pale/clammy skin, too weak to stand, low BP, rapid pulse)   Negative: Difficult to awaken or acting confused (e.g., disoriented, slurred speech)   Negative: Passed out (i.e., lost consciousness, collapsed and was not responding)   Negative: Sounds like a life-threatening emergency to the triager   " Negative: Followed an abdomen (stomach) injury   Negative: SEVERE difficulty breathing (e.g., struggling for each breath, speaks in single words)   Negative: Difficult to awaken or acting confused (e.g., disoriented, slurred speech)   Negative: Shock suspected (e.g., cold/pale/clammy skin, too weak to stand, low BP, rapid pulse)   Negative: Passed out (i.e., lost consciousness, collapsed and was not responding)    Protocols used: Abdominal Pain - Female-P-AH, Abdominal Pain - Female-A-AH, Chest Pain-A-AH  Cindy Lauren RN   Triage Nurse Advisor on 9/15/2024 at 9:57 AM   None

## 2024-09-15 NOTE — ED PROVIDER NOTES
Emergency Department Note      History of Present Illness     Chief Complaint   Nausea & Vomiting      HPI   Hector Mazariegos is a 18 year old female who presents with complaints of nausea and vomiting. The patient reports that she had an onset of pain in her abdomen, nausea, and vomiting on Tuesday. She is unable to eat or keep down medications. When she vomits, she experiences 30 minutes of shortness of breath as well as dizziness. She also endorses migraines and constipation. Her last bowel movement was on Tuesday, and she had diarrhea at that time. She denies any hematemesis. She denies any history of surgery to her abdomen. She denies any personal or family history of issues related to gallbladder. She notes that she has been to 3 different ER's in the last week, and had 2 Urgent Care visits. She has received ultrasounds and x-rays without findings, and she is unable to keep down medications that she is given. She has used some antacids as well as a rectal antinausea medication, which she feels have improved her symptoms. She notes concern of an H. Pylori infection, as her mother and sister have had it. She denies any personal history of  H.pylori. She denies any alcohol usage but notes marijuana use, with her most recent use a week ago.       Independent Historian   None    Review of External Notes   [facility] visit from 9/13/2024 for similar symptoms    Past Medical History     Medical History and Problem List   Anxiety   Acute PE    Medications   Ondansetron  Pantoprazole  Prochlorperazine    Surgical History   None    Physical Exam     Patient Vitals for the past 24 hrs:   BP Temp Temp src Pulse Resp SpO2 Height Weight   09/15/24 1715 (!) 146/70 -- -- 54 -- 100 % -- --   09/15/24 1658 (!) 146/72 -- -- -- -- 99 % -- --   09/15/24 1641 (!) 144/56 -- -- -- -- 100 % -- --   09/15/24 1628 (!) 155/81 -- -- 66 -- 100 % -- --   09/15/24 1626 (!) 147/72 -- -- 64 -- 99 % -- --   09/15/24 1556  "(!) 147/81 -- -- -- -- 100 % -- --   09/15/24 1541 (!) 165/64 -- -- -- -- 100 % -- --   09/15/24 1526 (!) 157/72 -- -- -- -- 100 % -- --   09/15/24 1409 (!) 168/97 -- -- 54 -- 100 % -- --   09/15/24 1046 126/88 98.3  F (36.8  C) Temporal 59 18 99 % 1.753 m (5' 9\") 110.1 kg (242 lb 11.6 oz)     Physical Exam  Constitutional: Well developed, somewhat forlorn, nontox appearance  Head: Atraumatic.   Neck:  no stridor  Eyes: no scleral icterus  Cardiovascular: Regular bradycardia, 2+ bilat radial pulses  Pulmonary/Chest: nml resp effort  Abdominal: ND, soft, epigastric tenderness, no rebound or guarding   Ext: Warm, well perfused, no edema  Neurological: A&O, symmetric facies, moves ext x4  Skin: Skin is warm and dry.   Psychiatric: Behavior is normal. Thought content normal.   Nursing note and vitals reviewed.      Diagnostics     Lab Results   Labs Ordered and Resulted from Time of ED Arrival to Time of ED Departure   ROUTINE UA WITH MICROSCOPIC REFLEX TO CULTURE - Abnormal       Result Value    Color Urine Yellow      Appearance Urine Clear      Glucose Urine Negative      Bilirubin Urine Small (*)     Ketones Urine >150 (*)     Specific Gravity Urine 1.030      Blood Urine Negative      pH Urine 6.0      Protein Albumin Urine 50 (*)     Urobilinogen Urine 8.0 (*)     Nitrite Urine Negative      Leukocyte Esterase Urine Negative      Mucus Urine Present (*)     RBC Urine 2      WBC Urine 1      Squamous Epithelials Urine 4 (*)    COMPREHENSIVE METABOLIC PANEL - Abnormal    Sodium 138      Potassium 3.4      Carbon Dioxide (CO2) 21 (*)     Anion Gap 17 (*)     Urea Nitrogen 12.2      Creatinine 0.65      GFR Estimate >90      Calcium 8.8      Chloride 100      Glucose 74      Alkaline Phosphatase 80      AST 69 (*)      (*)     Protein Total 7.5      Albumin 4.5      Bilirubin Total 0.7     CBC WITH PLATELETS AND DIFFERENTIAL - Abnormal    WBC Count 8.0      RBC Count 5.15      Hemoglobin 11.3 (*)     Hematocrit " 37.7      MCV 73 (*)     MCH 21.9 (*)     MCHC 30.0 (*)     RDW 19.5 (*)     Platelet Count 456 (*)     % Neutrophils 66      % Lymphocytes 27      % Monocytes 7      % Eosinophils 0      % Basophils 0      % Immature Granulocytes 0      NRBCs per 100 WBC 0      Absolute Neutrophils 5.2      Absolute Lymphocytes 2.2      Absolute Monocytes 0.5      Absolute Eosinophils 0.0      Absolute Basophils 0.0      Absolute Immature Granulocytes 0.0      Absolute NRBCs 0.0     LIPASE - Normal    Lipase 44     HCG QUALITATIVE PREGNANCY - Normal    hCG Serum Qualitative Negative     URINE DRUG SCREEN PANEL       Imaging   CT Abdomen Pelvis w Contrast   Preliminary Result   IMPRESSION:    1.  Left ovarian/adnexal 4.4 cm cyst.   2.  No acute abnormality otherwise seen.              Independent Interpretation   None    ED Course      Medications Administered   Medications   ondansetron (ZOFRAN) injection 4 mg (4 mg Intravenous $Given 9/15/24 1647)   sodium chloride 0.9% BOLUS 1,000 mL (1,000 mLs Intravenous $New Bag 9/15/24 1243)   metoclopramide (REGLAN) injection 10 mg (10 mg Intravenous $Given 9/15/24 1245)   diphenhydrAMINE (BENADRYL) injection 25 mg (25 mg Intravenous $Given 9/15/24 1244)   morphine (PF) injection 4 mg (4 mg Intravenous $Given 9/15/24 1647)   pantoprazole (PROTONIX) IV push injection 80 mg (80 mg Intravenous $Given 9/15/24 1647)   sodium chloride 0.9% BOLUS 1,000 mL (1,000 mLs Intravenous $New Bag 9/15/24 1648)   iopamidol (ISOVUE-370) solution 100 mL (100 mLs Intravenous $Given 9/15/24 1616)   sodium chloride 0.9 % bag 500mL for CT scan flush use (65 mLs Intravenous $Given 9/15/24 1617)       Procedures   Procedures     Discussion of Management   Admitting HospitalistOscar    ED Course   ED Course as of 09/15/24 1752   Sun Sep 15, 2024   1234 I initially assessed the patient and obtained the history and physical exam.        Additional Documentation  Social determinants, occasional marijuana use but  none recently    Medical Decision Making / Diagnosis     CMS Diagnoses: None    MIPS       None    [facility]   Hector Mazariegos is a 18 year old female presenting with vomiting, epigastric abdominal pain    Differential diagnosis includes gastritis, peptic ulcer disease, H. pylori, pelvic ascus perforation, obstruction, electrolyte abnormality, dehydration, hepatitis, pancreatitis.  Labs significant for mildly elevated LFTs possibly secondary to multiple episodes of vomiting.  Patient is given medication as noted above without significant improvement in symptoms and failed p.o. challenge.  CT abdomen pelvis ordered for further evaluation and unremarkable other than incidental finding of ovarian cyst.  Patient's presentation seems most consistent with gastritis.  It seems unlikely that she is experiencing cannabinoid hyperemesis given her rare and remote use of marijuana.  Pantoprazole given as noted above.  The patient was subsequently admitted to the hospital service given her inability to tolerate p.o. patient was counseled on results, diagnosis and disposition prior to admission.  She is understanding agreeable to plan.      Disposition   The patient was admitted to the hospital.     Diagnosis     ICD-10-CM    1. Acute gastritis without hemorrhage, unspecified gastritis type  K29.00            Discharge Medications   New Prescriptions    No medications on file         Scribe Disclosure:  ICarmela, am serving as a scribe at 11:55 AM on 9/15/2024 to document services personally performed by Trent Box MD based on my observations and the provider's statements to me.        Trent Box MD  09/15/24 4188

## 2024-09-15 NOTE — ED NOTES
[facility]  ED Nurse Handoff Report    ED Chief complaint: Nausea & Vomiting  . ED Diagnosis:   Final diagnoses:   None       Allergies: No Known Allergies    Code Status: Full Code    Activity level - Baseline/Home:  independent.  Activity Level - Current:   independent.   Lift room needed: No.   Bariatric: No   Needed: No   Isolation: No.   Infection: Not Applicable.     Respiratory status: Room air    Vital Signs (within 30 minutes):   Vitals:    09/15/24 1626 09/15/24 1628 09/15/24 1641 09/15/24 1658   BP: (!) 147/72 (!) 155/81 (!) 144/56 (!) 146/72   Pulse: 64 66     Resp:       Temp:       TempSrc:       SpO2: 99% 100% 100% 99%   Weight:       Height:           Cardiac Rhythm:  ,      Pain level:    Patient confused: No.   Patient Falls Risk: nonskid shoes/slippers when out of bed, patient and family education, and mobility aid in reach.   Elimination Status: Has voided     Patient Report - Initial Complaint: Abdominal pain/diarrhea.   Focused Assessment: Hector Mazariegos is a 18 year old female who presents with complaints of nausea and vomiting. The patient reports that she had an onset of pain in her abdomen, nausea, and vomiting on Tuesday. She is unable to eat or keep down medications. When she vomits, she experiences 30 minutes of shortness of breath as well as dizziness. She also endorses migraines and constipation. Her last bowel movement was on Tuesday, and she had diarrhea at that time. She denies any hematemesis. She denies any history of surgery to her abdomen. She denies any personal or family history of issues related to gallbladder. She notes that she has been to 3 different ER's in the last week, and had 2 Urgent Care visits. She has received ultrasounds and x-rays without findings, and she is unable to keep down medications that she is given. She has used some antacids as well as a rectal antinausea medication, which she feels have improved her symptoms. She  notes concern of an H. Pylori infection, as her mother and sister have had it. She denies any personal history of  H.pylori. She denies any alcohol usage but notes marijuana use, with her most recent use a week ago.      Abnormal Results:   Labs Ordered and Resulted from Time of ED Arrival to Time of ED Departure   ROUTINE UA WITH MICROSCOPIC REFLEX TO CULTURE - Abnormal       Result Value    Color Urine Yellow      Appearance Urine Clear      Glucose Urine Negative      Bilirubin Urine Small (*)     Ketones Urine >150 (*)     Specific Gravity Urine 1.030      Blood Urine Negative      pH Urine 6.0      Protein Albumin Urine 50 (*)     Urobilinogen Urine 8.0 (*)     Nitrite Urine Negative      Leukocyte Esterase Urine Negative      Mucus Urine Present (*)     RBC Urine 2      WBC Urine 1      Squamous Epithelials Urine 4 (*)    COMPREHENSIVE METABOLIC PANEL - Abnormal    Sodium 138      Potassium 3.4      Carbon Dioxide (CO2) 21 (*)     Anion Gap 17 (*)     Urea Nitrogen 12.2      Creatinine 0.65      GFR Estimate >90      Calcium 8.8      Chloride 100      Glucose 74      Alkaline Phosphatase 80      AST 69 (*)      (*)     Protein Total 7.5      Albumin 4.5      Bilirubin Total 0.7     CBC WITH PLATELETS AND DIFFERENTIAL - Abnormal    WBC Count 8.0      RBC Count 5.15      Hemoglobin 11.3 (*)     Hematocrit 37.7      MCV 73 (*)     MCH 21.9 (*)     MCHC 30.0 (*)     RDW 19.5 (*)     Platelet Count 456 (*)     % Neutrophils 66      % Lymphocytes 27      % Monocytes 7      % Eosinophils 0      % Basophils 0      % Immature Granulocytes 0      NRBCs per 100 WBC 0      Absolute Neutrophils 5.2      Absolute Lymphocytes 2.2      Absolute Monocytes 0.5      Absolute Eosinophils 0.0      Absolute Basophils 0.0      Absolute Immature Granulocytes 0.0      Absolute NRBCs 0.0     LIPASE - Normal    Lipase 44     HCG QUALITATIVE PREGNANCY - Normal    hCG Serum Qualitative Negative          CT Abdomen Pelvis w Contrast     (Results Pending)       Treatments provided: IVF, meds, labs  Family Comments: friend present in room   OBS brochure/video discussed/provided to patient:  No  ED Medications:   Medications   ondansetron (ZOFRAN) injection 4 mg (4 mg Intravenous $Given 9/15/24 1647)   sodium chloride 0.9% BOLUS 1,000 mL (1,000 mLs Intravenous $New Bag 9/15/24 1648)   sodium chloride 0.9% BOLUS 1,000 mL (1,000 mLs Intravenous $New Bag 9/15/24 1243)   metoclopramide (REGLAN) injection 10 mg (10 mg Intravenous $Given 9/15/24 1245)   diphenhydrAMINE (BENADRYL) injection 25 mg (25 mg Intravenous $Given 9/15/24 1244)   morphine (PF) injection 4 mg (4 mg Intravenous $Given 9/15/24 1647)   pantoprazole (PROTONIX) IV push injection 80 mg (80 mg Intravenous $Given 9/15/24 1647)   iopamidol (ISOVUE-370) solution 100 mL (100 mLs Intravenous $Given 9/15/24 1616)   sodium chloride 0.9 % bag 500mL for CT scan flush use (65 mLs Intravenous $Given 9/15/24 1617)       Drips infusing:  No  For the majority of the shift this patient was Green.   Interventions performed were updated on plan of care and interventions.    Sepsis treatment initiated: No    Cares/treatment/interventions/medications to be completed following ED care: admission orders    ED Nurse Name: Melissa Olson RN  5:09 PM  RECEIVING UNIT ED HANDOFF REVIEW    Above ED Nurse Handoff Report was reviewed: Yes  Reviewed by: Tamela Schmidt RN on September 15, 2024 at 8:18 PM   DAVID Smith called the ED to inform them the note was read: Yes

## 2024-09-15 NOTE — ED TRIAGE NOTES
Pt arrives ambulatory with family member for nausea, vomiting since Tuesday. Also reports diarrhea Tuesday but now constipation. Reports being told to come to ED for stool sample and pt is worried about H.pylori. Reports headaches from dry heaving. Unable to keep anything down per pt. Was sharp pains at first and now stabbing and a scraping type pain. No medications taken today.

## 2024-09-16 LAB
ALBUMIN SERPL BCG-MCNC: 3.6 G/DL (ref 3.5–5.2)
ALP SERPL-CCNC: 66 U/L (ref 40–150)
ALT SERPL W P-5'-P-CCNC: 76 U/L (ref 0–50)
ANION GAP SERPL CALCULATED.3IONS-SCNC: 13 MMOL/L (ref 7–15)
AST SERPL W P-5'-P-CCNC: 32 U/L (ref 0–45)
BILIRUB DIRECT SERPL-MCNC: 0.24 MG/DL (ref 0–0.3)
BILIRUB SERPL-MCNC: 0.7 MG/DL
BUN SERPL-MCNC: 6.6 MG/DL (ref 6–20)
CALCIUM SERPL-MCNC: 8.1 MG/DL (ref 8.8–10.4)
CHLORIDE SERPL-SCNC: 101 MMOL/L (ref 98–107)
CREAT SERPL-MCNC: 0.66 MG/DL (ref 0.51–0.95)
EGFRCR SERPLBLD CKD-EPI 2021: >90 ML/MIN/1.73M2
ERYTHROCYTE [DISTWIDTH] IN BLOOD BY AUTOMATED COUNT: 18.7 % (ref 10–15)
GLUCOSE BLDC GLUCOMTR-MCNC: 104 MG/DL (ref 70–99)
GLUCOSE BLDC GLUCOMTR-MCNC: 64 MG/DL (ref 70–99)
GLUCOSE SERPL-MCNC: 67 MG/DL (ref 70–99)
HAV IGM SERPL QL IA: NONREACTIVE
HBV CORE IGM SERPL QL IA: NONREACTIVE
HBV SURFACE AG SERPL QL IA: NONREACTIVE
HCO3 SERPL-SCNC: 19 MMOL/L (ref 22–29)
HCT VFR BLD AUTO: 33.8 % (ref 35–47)
HCV AB SERPL QL IA: NONREACTIVE
HGB BLD-MCNC: 10.2 G/DL (ref 11.7–15.7)
HGB BLD-MCNC: 9.8 G/DL (ref 11.7–15.7)
MCH RBC QN AUTO: 21.6 PG (ref 26.5–33)
MCHC RBC AUTO-ENTMCNC: 29 G/DL (ref 31.5–36.5)
MCV RBC AUTO: 74 FL (ref 78–100)
PLATELET # BLD AUTO: 374 10E3/UL (ref 150–450)
POTASSIUM SERPL-SCNC: 3.5 MMOL/L (ref 3.4–5.3)
PROT SERPL-MCNC: 6.1 G/DL (ref 6.4–8.3)
RBC # BLD AUTO: 4.54 10E6/UL (ref 3.8–5.2)
SODIUM SERPL-SCNC: 133 MMOL/L (ref 135–145)
WBC # BLD AUTO: 7.5 10E3/UL (ref 4–11)

## 2024-09-16 PROCEDURE — 85027 COMPLETE CBC AUTOMATED: CPT | Performed by: INTERNAL MEDICINE

## 2024-09-16 PROCEDURE — 258N000003 HC RX IP 258 OP 636: Performed by: INTERNAL MEDICINE

## 2024-09-16 PROCEDURE — 82962 GLUCOSE BLOOD TEST: CPT

## 2024-09-16 PROCEDURE — 87338 HPYLORI STOOL AG IA: CPT | Performed by: INTERNAL MEDICINE

## 2024-09-16 PROCEDURE — 96376 TX/PRO/DX INJ SAME DRUG ADON: CPT

## 2024-09-16 PROCEDURE — 99207 PR NO BILLABLE SERVICE THIS VISIT: CPT | Performed by: INTERNAL MEDICINE

## 2024-09-16 PROCEDURE — G0378 HOSPITAL OBSERVATION PER HR: HCPCS

## 2024-09-16 PROCEDURE — 96375 TX/PRO/DX INJ NEW DRUG ADDON: CPT

## 2024-09-16 PROCEDURE — 99418 PROLNG IP/OBS E/M EA 15 MIN: CPT | Performed by: INTERNAL MEDICINE

## 2024-09-16 PROCEDURE — 250N000011 HC RX IP 250 OP 636: Performed by: INTERNAL MEDICINE

## 2024-09-16 PROCEDURE — 96361 HYDRATE IV INFUSION ADD-ON: CPT

## 2024-09-16 PROCEDURE — 82040 ASSAY OF SERUM ALBUMIN: CPT | Performed by: INTERNAL MEDICINE

## 2024-09-16 PROCEDURE — 85018 HEMOGLOBIN: CPT | Performed by: INTERNAL MEDICINE

## 2024-09-16 PROCEDURE — 99233 SBSQ HOSP IP/OBS HIGH 50: CPT | Performed by: INTERNAL MEDICINE

## 2024-09-16 PROCEDURE — 250N000009 HC RX 250: Performed by: INTERNAL MEDICINE

## 2024-09-16 PROCEDURE — 250N000013 HC RX MED GY IP 250 OP 250 PS 637: Performed by: INTERNAL MEDICINE

## 2024-09-16 PROCEDURE — 36415 COLL VENOUS BLD VENIPUNCTURE: CPT | Performed by: INTERNAL MEDICINE

## 2024-09-16 PROCEDURE — 82310 ASSAY OF CALCIUM: CPT | Performed by: INTERNAL MEDICINE

## 2024-09-16 PROCEDURE — 258N000001 HC RX 258: Performed by: INTERNAL MEDICINE

## 2024-09-16 RX ORDER — NICOTINE POLACRILEX 4 MG
15-30 LOZENGE BUCCAL
Status: DISCONTINUED | OUTPATIENT
Start: 2024-09-16 | End: 2024-09-17 | Stop reason: HOSPADM

## 2024-09-16 RX ORDER — DEXTROSE MONOHYDRATE 25 G/50ML
25-50 INJECTION, SOLUTION INTRAVENOUS
Status: DISCONTINUED | OUTPATIENT
Start: 2024-09-16 | End: 2024-09-17 | Stop reason: HOSPADM

## 2024-09-16 RX ORDER — DEXTROSE MONOHYDRATE AND SODIUM CHLORIDE 5; .9 G/100ML; G/100ML
INJECTION, SOLUTION INTRAVENOUS CONTINUOUS
Status: DISCONTINUED | OUTPATIENT
Start: 2024-09-16 | End: 2024-09-17

## 2024-09-16 RX ADMIN — PROCHLORPERAZINE EDISYLATE 10 MG: 5 INJECTION INTRAMUSCULAR; INTRAVENOUS at 01:59

## 2024-09-16 RX ADMIN — DEXTROSE MONOHYDRATE 50 ML: 25 INJECTION, SOLUTION INTRAVENOUS at 12:32

## 2024-09-16 RX ADMIN — ONDANSETRON 8 MG: 2 INJECTION INTRAMUSCULAR; INTRAVENOUS at 10:07

## 2024-09-16 RX ADMIN — SUCRALFATE ORAL 1 G: 1 SUSPENSION ORAL at 14:02

## 2024-09-16 RX ADMIN — SUCRALFATE ORAL 1 G: 1 SUSPENSION ORAL at 18:02

## 2024-09-16 RX ADMIN — DEXTROSE AND SODIUM CHLORIDE: 5; 900 INJECTION, SOLUTION INTRAVENOUS at 12:53

## 2024-09-16 RX ADMIN — ONDANSETRON 8 MG: 2 INJECTION INTRAMUSCULAR; INTRAVENOUS at 20:35

## 2024-09-16 RX ADMIN — ACETAMINOPHEN 325MG 650 MG: 325 TABLET ORAL at 01:59

## 2024-09-16 RX ADMIN — ACETAMINOPHEN 325MG 650 MG: 325 TABLET ORAL at 20:41

## 2024-09-16 RX ADMIN — SUCRALFATE ORAL 1 G: 1 SUSPENSION ORAL at 22:20

## 2024-09-16 RX ADMIN — SODIUM CHLORIDE, POTASSIUM CHLORIDE, SODIUM LACTATE AND CALCIUM CHLORIDE: 600; 310; 30; 20 INJECTION, SOLUTION INTRAVENOUS at 07:43

## 2024-09-16 RX ADMIN — DEXTROSE AND SODIUM CHLORIDE: 5; 900 INJECTION, SOLUTION INTRAVENOUS at 22:49

## 2024-09-16 RX ADMIN — PANTOPRAZOLE SODIUM 40 MG: 40 INJECTION, POWDER, FOR SOLUTION INTRAVENOUS at 10:10

## 2024-09-16 ASSESSMENT — ACTIVITIES OF DAILY LIVING (ADL)
ADLS_ACUITY_SCORE: 19

## 2024-09-16 NOTE — PROVIDER NOTIFICATION
Provider notified of patient's low blood sugar of 64.     Provider response: Hypoglycemia protocol ordered and maintenance fluids changed to D5NS.

## 2024-09-16 NOTE — PROGRESS NOTES
[facility]  Hospitalist Progress Note  Allison Olson MD 09/16/2024    Reason for Stay (Diagnosis): n/v         Assessment and Plan:      Summary of Stay: Hector Mazariegos is a 18 year old female with a history of provoked PE after breast reduction surgery, no longer on AC,  obesity, GERD,  admitted on 9/15/2024 with n/v.     Patient has has had ongoing issues for a week, has had 3 ER visits and 2 UC visits. She can't keep anyting down. This started 5 days ago and had some diarrhea with it. She denies THC use, though doing a tox screen was positive.     Denies fever, chills, sweats. 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 In the ER, given antiemtics and fluids. Her lytes are normal with a normal creatinine, lfts up slightly wbc normal. She is not pregnant with a negative HCG, urine analysis negative. She had a CT of the abdomin which was normal       Problem List:   Hyperemesis   2/2 THC gummies.  She's only taken them infrequently and every time this is what happens to her.  Discussed that this will just get worse and can even worsen over time  She plans on not using them again   Will slowly adat today in the hopes of discharge tomorrow    Hematemesis   Minimal and seems unlikely to be MW tear.    Cont PPI,    GERD  PPI     Elevated LFTs  AST//69, and rapidly improving  I don't see any prior elevated enzymes so presumably related to her hyperemesis     Anxiety   Not on medications in the OP setting    DVT Prophylaxis: Pneumatic Compression Devices  Code Status: Full Code  Functional Status: independent  Diet: regular  [place]: not needed  Access : PIV      Medically Ready for Discharge: Anticipated Tomorrow     Securely message with [employer] (more info)  Text page via [facility] Paging/Directory       I spent 45 minutes reviewing epic including prior labs/imaging/medical history and notes related to this encounter  In addition time was spent in interveiwing the patient, communicating with contacts, and medical decision  "making      Interval History (Subjective):      Feels like she is starting to improve.  No more n/v, is wanting to try some broth                   Physical Exam:      Last Vital Signs:  /56   Pulse 65   Temp 98.2  F (36.8  C) (Oral)   Resp 20   Ht 1.753 m (5' 9\")   Wt 108.7 kg (239 lb 10.2 oz)   LMP 08/30/2024 (Approximate)   SpO2 100%   BMI 35.39 kg/m      I/O:  Very pleasant nad looks stated age head nc/at sclera clear lungs ctab nl effort rrr no mrg no le edema skin warm and dry no cyanosis or clubbing alert and oriented affect appropriate madison belly s/nt/nd         Medications:      All current medications were reviewed with changes reflected in problem list.         Data:      All new lab and imaging data was reviewed.   Labs:  Recent Labs   Lab 09/16/24  1311 09/16/24  1133 09/16/24  0816   NA  --   --  133*   POTASSIUM  --   --  3.5   CHLORIDE  --   --  101   CO2  --   --  19*   ANIONGAP  --   --  13   *   < > 67*   BUN  --   --  6.6   CR  --   --  0.66   GFRESTIMATED  --   --  >90   DANIELLA  --   --  8.1*    < > = values in this interval not displayed.     Recent Labs   Lab 09/16/24  0816 09/16/24  0309   WBC  --  7.5   HGB 10.2* 9.8*   HCT  --  33.8*   MCV  --  74*   PLT  --  374     GFR Estimate   Date Value Ref Range Status   09/16/2024 >90 >60 mL/min/1.73m2 Final     Comment:     eGFR calculated using 2021 CKD-EPI equation.   09/15/2024 >90 >60 mL/min/1.73m2 Final     Comment:     eGFR calculated using 2021 CKD-EPI equation.   09/11/2024 >90 >60 mL/min/1.73m2 Final     Comment:     eGFR calculated using 2021 CKD-EPI equation.   08/15/2019 GFR not calculated, patient <18 years old. >60 mL/min/[1.73_m2] Final     Comment:     Non  GFR Calc  Starting 12/18/2018, serum creatinine based estimated GFR (eGFR) will be   calculated using the Chronic Kidney Disease Epidemiology Collaboration   (CKD-EPI) equation.     03/15/2016  mL/min/1.7m2 Final    GFR not calculated, patient <16 " years old.  Non  GFR Calc     09/01/2015 152.9 mL/min/1.7 m2 Final     GFR Estimate If Black   Date Value Ref Range Status   08/15/2019 GFR not calculated, patient <18 years old. >60 mL/min/[1.73_m2] Final     Comment:      GFR Calc  Starting 12/18/2018, serum creatinine based estimated GFR (eGFR) will be   calculated using the Chronic Kidney Disease Epidemiology Collaboration   (CKD-EPI) equation.     03/15/2016  mL/min/1.7m2 Final    GFR not calculated, patient <16 years old.   GFR Calc     09/01/2015 185.1 mL/min/1.7 m2 Final     Recent Labs   Lab 09/16/24  0816 09/15/24  1224 09/11/24  2031   AST 32 69* 30   ALT 76* 127* 16   ALKPHOS 66 80 81   BILITOTAL 0.7 0.7 0.6      Imaging:   Results for orders placed or performed during the hospital encounter of 09/15/24   CT Abdomen Pelvis w Contrast    Narrative    EXAM: CT ABDOMEN PELVIS W CONTRAST  LOCATION: [facility]  DATE: 9/15/2024    INDICATION: Epigastric abdominal pain.  COMPARISON: None.  TECHNIQUE: CT scan of the abdomen and pelvis was performed following injection of IV contrast. Multiplanar reformats were obtained. Dose reduction techniques were used.  CONTRAST: 100 mL Isovue 370.    FINDINGS:   LOWER CHEST: Normal.    HEPATOBILIARY: No acute abnormality. Fatty deposition at the anterior left liver. No calcified gallstones.    PANCREAS: Normal.    SPLEEN: Normal.    ADRENAL GLANDS: Normal.    KIDNEYS/BLADDER: Normal.    BOWEL: No obstruction or acute inflammatory change. Normal appendix. No abscess or free air.    LYMPH NODES: Normal.    VASCULATURE: Unremarkable.    PELVIC ORGANS: 4.4 cm left ovarian/adnexal cyst, image 170. Trace pelvic fluid may be physiologic.    MUSCULOSKELETAL: Normal.      Impression    IMPRESSION:   1.  Left ovarian/adnexal 4.4 cm cyst.  2.  No acute abnormality otherwise seen.

## 2024-09-16 NOTE — PROGRESS NOTES
[facility]    Hospitalist cross cover note  Name: Hector Mazariegos    MRN: [id number]  Provider: Noris Ibrahim MD  Date of Service: 09/16/2024    Assessment & Plan   Cross cover page as patient had specks of blood in her vomit.  Summary of Stay: Hector Mazariegos is a 18 year old female who was admitted on 9/15/2024 for hyperemesis related to THC use.  Patient had explosive vomit with specks of blood suspect due to gastritis and explosive nature of vomiting  -She complains of abdominal pain and epigastric burning-like pain.    ?  Hematemesis   -Specks of blood in vomit  Suspect gastritis as explosive vomit  Mago-Castañeda  -She is on PPI  -Will trend hemoglobin.  Repeat hemoglobin ordered  -N.p.o. for now  -IV fluids  -Monitor vitals     Abdominal pain  -Likely secondary to gastritis  -On review of record patient did have abnormal LFTs  -Lipase was within normal limits  -No evidence of acute surgical abdomen on exam            Interval History   Complains of abdominal pain nausea vomiting.  Most recent vomit had specks of blood.  Patient is scared also complains of abdominal pain mostly in epigastric area described as burning pain no fever chills no diarrhea.  Total time spent in direct patient care is more than 35 minutes  -Data reviewed today: I reviewed all new labs and imaging reports over the last 24 hours. I personally reviewed no images or EKG's today.    Physical Exam   Temp: 98.3  F (36.8  C) Temp src: Oral BP: (!) 140/74 Pulse: 83   Resp: 16 SpO2: 99 % O2 Device: None (Room air)    Vitals:    09/15/24 1046 09/15/24 2048   Weight: 110.1 kg (242 lb 11.6 oz) 108.7 kg (239 lb 10.2 oz)     Vital Signs with Ranges  Temp:  [98.1  F (36.7  C)-98.5  F (36.9  C)] 98.3  F (36.8  C)  Pulse:  [51-83] 83  Resp:  [16-18] 16  BP: (126-168)/(56-97) 140/74  SpO2:  [99 %-100 %] 99 %  No intake/output data recorded.      GEN:  Alert, oriented x 3, appears comfortable, NAD.  HEENT:  Normocephalic/atraumatic, no  scleral icterus, no nasal discharge, mouth moist.  CV:  Regular rate and rhythm, no murmur or JVD.  S1 + S2 noted, no S3 or S4.  LUNGS:  Clear to auscultation bilaterally without rales/rhonchi/wheezing/retractions.  Symmetric chest rise on inhalation noted.  ABD:  Active bowel sounds, soft, minimal tenderness no rebound or guarding..  No rebound/guarding/rigidity.  EXT:  No edema.  No cyanosis.  No joint synovitis noted.  SKIN:  Dry to touch, no exanthems noted in the visualized areas.    Medications   Current Facility-Administered Medications   Medication Dose Route Frequency Provider Last Rate Last Admin    lactated ringers infusion   Intravenous Continuous Tye Moore  mL/hr at 09/15/24 2218 New Bag at 09/15/24 2218     Current Facility-Administered Medications   Medication Dose Route Frequency Provider Last Rate Last Admin    ondansetron (ZOFRAN) injection 8 mg  8 mg Intravenous Q12H Tye Moore MD   8 mg at 09/15/24 2225    pantoprazole (PROTONIX) IV push injection 40 mg  40 mg Intravenous Daily with breakfast Noris Ibrahim MD        sucralfate (CARAFATE) suspension 1 g  1 g Oral 4x Daily Tye Moore MD   1 g at 09/15/24 2226     Data     Recent Labs   Lab 09/16/24  0309 09/15/24  1224 09/11/24 2031   WBC 7.5 8.0 7.9   HGB 9.8* 11.3* 11.5*   HCT 33.8* 37.7 37.0   MCV 74* 73* 72*    456* 561*     Recent Labs   Lab 09/15/24  1224 09/11/24 2031    145   POTASSIUM 3.4 3.9   CHLORIDE 100 106   CO2 21* 21*   ANIONGAP 17* 18*   GLC 74 128*   BUN 12.2 13.3   CR 0.65 0.75   GFRESTIMATED >90 >90   DANIELLA 8.8 9.5     Recent Labs   Lab 09/15/24  1224 09/11/24 2031   AST 69* 30   * 16   ALKPHOS 80 81   BILITOTAL 0.7 0.6       Recent Results (from the past 24 hour(s))   CT Abdomen Pelvis w Contrast    Narrative    EXAM: CT ABDOMEN PELVIS W CONTRAST  LOCATION: [facility]  DATE: 9/15/2024    INDICATION: Epigastric abdominal pain.  COMPARISON:  None.  TECHNIQUE: CT scan of the abdomen and pelvis was performed following injection of IV contrast. Multiplanar reformats were obtained. Dose reduction techniques were used.  CONTRAST: 100 mL Isovue 370.    FINDINGS:   LOWER CHEST: Normal.    HEPATOBILIARY: No acute abnormality. Fatty deposition at the anterior left liver. No calcified gallstones.    PANCREAS: Normal.    SPLEEN: Normal.    ADRENAL GLANDS: Normal.    KIDNEYS/BLADDER: Normal.    BOWEL: No obstruction or acute inflammatory change. Normal appendix. No abscess or free air.    LYMPH NODES: Normal.    VASCULATURE: Unremarkable.    PELVIC ORGANS: 4.4 cm left ovarian/adnexal cyst, image 170. Trace pelvic fluid may be physiologic.    MUSCULOSKELETAL: Normal.      Impression    IMPRESSION:   1.  Left ovarian/adnexal 4.4 cm cyst.  2.  No acute abnormality otherwise seen.

## 2024-09-16 NOTE — PHARMACY-ADMISSION MEDICATION HISTORY
Pharmacist Admission Medication History    Admission medication history is complete. The information provided in this note is only as accurate as the sources available at the time of the update.    Information Source(s): Patient via in-person    Pertinent Information: -    Changes made to PTA medication list:  Added: None  Deleted: None  Changed: None    Allergies reviewed with patient and updates made in EHR: yes    Medication History Completed By: Ml Marks RPH 9/16/2024 9:53 AM    No outpatient medications have been marked as taking for the 9/15/24 encounter (Hospital Encounter).

## 2024-09-16 NOTE — PLAN OF CARE
"Goal Outcome Evaluation:      Plan of Care Reviewed With: patient    Overall Patient Progress: improvingOverall Patient Progress: improving    VSS. Independent in room.    Resp: LS clear and equal bilaterally.   GI/: Left quadrant soft and tender. x1 BM this shift.Intermittent nausea and  x1 emesis. Zofran x1 given. Diet advance from clear to reg  diet.   IV: WDL infusing D5 NS @100ml  Pain/Comfort: 4/10. No Prn's given.    Skin: WDL  Plan: VSS, H.pylori stool sample pending, nausea and pain control     Problem: Adult Inpatient Plan of Care  Goal: Plan of Care Review  Description: The Plan of Care Review/Shift note should be completed every shift.  The Outcome Evaluation is a brief statement about your assessment that the patient is improving, declining, or no change.  This information will be displayed automatically on your shift  note.  Outcome: Progressing  Flowsheets (Taken 9/16/2024 5664)  Plan of Care Reviewed With: patient  Overall Patient Progress: improving  Goal: Patient-Specific Goal (Individualized)  Description: You can add care plan individualizations to a care plan. Examples of Individualization might be:  \"Parent requests to be called daily at 9am for status\", \"I have a hard time hearing out of my right ear\", or \"Do not touch me to wake me up as it startles  me\".  Outcome: Progressing  Goal: Absence of Hospital-Acquired Illness or Injury  Outcome: Progressing  Intervention: Identify and Manage Fall Risk  Recent Flowsheet Documentation  Taken 9/16/2024 1007 by Jennifer Weaver, RN  Safety Promotion/Fall Prevention:   clutter free environment maintained   nonskid shoes/slippers when out of bed   patient and family education   room near nurse's station   room organization consistent   safety round/check completed  Intervention: Prevent Skin Injury  Recent Flowsheet Documentation  Taken 9/16/2024 1007 by Jennifer Weaver, RN  Body Position: position changed independently  Skin Protection: adhesive " use limited  Device Skin Pressure Protection:   adhesive use limited   tubing/devices free from skin contact  Intervention: Prevent Infection  Recent Flowsheet Documentation  Taken 9/16/2024 1007 by Jennifer Weaver RN  Infection Prevention:   equipment surfaces disinfected   hand hygiene promoted   rest/sleep promoted   single patient room provided  Goal: Optimal Comfort and Wellbeing  Outcome: Progressing  Goal: Readiness for Transition of Care  Outcome: Progressing

## 2024-09-16 NOTE — PLAN OF CARE
"Goal Outcome Evaluation:    Vitals: VSS. afebrile. Independent in room.   Resp: WDL.  LS clear and equal   GI/: nondistended. RLQ, LLQ, RUQ tender. passing flatus, no BM this shift, BS hypoactive. Emesis x1. Compazine given x1. NPO with ice chips. Voiding WDL.  IV: WDL dressing c/d/i, infusing LR@100  Pain/Comfort: 5-8/10. Tylenol x1. Tpump given.  Skin: WDL  Plan: IVF. Continue NPO. Pain and nausea management. Recheck hgb in am      Plan of Care Reviewed With: patient    Overall Patient Progress: no changeOverall Patient Progress: no change       Problem: Adult Inpatient Plan of Care  Goal: Plan of Care Review  Description: The Plan of Care Review/Shift note should be completed every shift.  The Outcome Evaluation is a brief statement about your assessment that the patient is improving, declining, or no change.  This information will be displayed automatically on your shift  note.  Outcome: Progressing  Flowsheets (Taken 9/16/2024 0320)  Plan of Care Reviewed With: patient  Overall Patient Progress: no change  Goal: Patient-Specific Goal (Individualized)  Description: You can add care plan individualizations to a care plan. Examples of Individualization might be:  \"Parent requests to be called daily at 9am for status\", \"I have a hard time hearing out of my right ear\", or \"Do not touch me to wake me up as it startles  me\".  Outcome: Progressing  Goal: Absence of Hospital-Acquired Illness or Injury  Outcome: Progressing  Intervention: Identify and Manage Fall Risk  Recent Flowsheet Documentation  Taken 9/15/2024 2048 by Tamela Schmidt, RN  Safety Promotion/Fall Prevention:   clutter free environment maintained   nonskid shoes/slippers when out of bed   patient and family education   room near nurse's station   room organization consistent   safety round/check completed  Intervention: Prevent Skin Injury  Recent Flowsheet Documentation  Taken 9/16/2024 0000 by Tamela Schmidt, RN  Body Position: position changed " independently  Taken 9/15/2024 2048 by Tamela Schmidt RN  Body Position: position changed independently  Skin Protection: adhesive use limited  Device Skin Pressure Protection:   adhesive use limited   tubing/devices free from skin contact  Intervention: Prevent Infection  Recent Flowsheet Documentation  Taken 9/15/2024 2048 by Tamela Schmidt RN  Infection Prevention:   equipment surfaces disinfected   hand hygiene promoted   rest/sleep promoted   single patient room provided  Goal: Optimal Comfort and Wellbeing  Outcome: Progressing  Intervention: Monitor Pain and Promote Comfort  Recent Flowsheet Documentation  Taken 9/16/2024 0234 by Tamela Schmidt RN  Pain Management Interventions: heat applied  Taken 9/16/2024 0158 by Tamela Schmidt RN  Pain Management Interventions: medication (see MAR)  Taken 9/16/2024 0000 by Tamela Schmidt RN  Pain Management Interventions:   medication offered but refused   care clustered   quiet environment facilitated   rest  Taken 9/15/2024 2048 by Tamela Schmidt RN  Pain Management Interventions:   medication offered but refused   care clustered   quiet environment facilitated   rest  Goal: Readiness for Transition of Care  Outcome: Progressing  Intervention: Mutually Develop Transition Plan  Recent Flowsheet Documentation  Taken 9/15/2024 2000 by Tamela Schmidt RN  Equipment Currently Used at Home: none     Problem: Skin Injury Risk Increased  Goal: Skin Health and Integrity  Outcome: Progressing  Intervention: Optimize Skin Protection  Recent Flowsheet Documentation  Taken 9/16/2024 0000 by Tamela Schmidt, RN  Head of Bed (HOB) Positioning: HOB at 20-30 degrees  Taken 9/15/2024 2048 by Tamela Schmidt RN  Skin Protection: adhesive use limited  Activity Management:   activity adjusted per tolerance   ambulated outside room   ambulated in room   ambulated to bathroom  Head of Bed (HOB) Positioning: HOB at 20-30 degrees

## 2024-09-16 NOTE — PROVIDER NOTIFICATION
Messaged cross cover @ 0230: pt had emesis states there was blood clots in it. Unable to tell upon assessment. hx of PE after breast reduction. Complaining of lower abdominal pain. Gave Tylenol and Compazine IV. Any new orders?     Response: MD assessed at bedside. Made NPO. Labs. Add Protonix.    ---     Messaged cross cover @1423: pt received a 80mg Protonix dose at 1647. Do you want me to give the 40mg dose now and at 0800, or wait to give it at 0800?    Response: advised to give at 0800    Messaged cross cover @ 6326 regarding hgb lab results being 9.8    Response: orders to recheck in AM

## 2024-09-17 VITALS
HEIGHT: 69 IN | OXYGEN SATURATION: 100 % | WEIGHT: 239.64 LBS | DIASTOLIC BLOOD PRESSURE: 67 MMHG | RESPIRATION RATE: 18 BRPM | BODY MASS INDEX: 35.49 KG/M2 | HEART RATE: 73 BPM | TEMPERATURE: 98.2 F | SYSTOLIC BLOOD PRESSURE: 129 MMHG

## 2024-09-17 LAB
ALBUMIN SERPL BCG-MCNC: 3.5 G/DL (ref 3.5–5.2)
ALP SERPL-CCNC: 70 U/L (ref 40–150)
ALT SERPL W P-5'-P-CCNC: 51 U/L (ref 0–50)
ANION GAP SERPL CALCULATED.3IONS-SCNC: 12 MMOL/L (ref 7–15)
AST SERPL W P-5'-P-CCNC: 22 U/L (ref 0–45)
BILIRUB SERPL-MCNC: 0.4 MG/DL
BUN SERPL-MCNC: 3.6 MG/DL (ref 6–20)
CALCIUM SERPL-MCNC: 7.8 MG/DL (ref 8.8–10.4)
CHLORIDE SERPL-SCNC: 108 MMOL/L (ref 98–107)
CREAT SERPL-MCNC: 0.64 MG/DL (ref 0.51–0.95)
EGFRCR SERPLBLD CKD-EPI 2021: >90 ML/MIN/1.73M2
ERYTHROCYTE [DISTWIDTH] IN BLOOD BY AUTOMATED COUNT: 19.2 % (ref 10–15)
GLUCOSE SERPL-MCNC: 102 MG/DL (ref 70–99)
H PYLORI AG STL QL IA: POSITIVE
HCO3 SERPL-SCNC: 20 MMOL/L (ref 22–29)
HCT VFR BLD AUTO: 33.5 % (ref 35–47)
HGB BLD-MCNC: 10 G/DL (ref 11.7–15.7)
MCH RBC QN AUTO: 22.3 PG (ref 26.5–33)
MCHC RBC AUTO-ENTMCNC: 29.9 G/DL (ref 31.5–36.5)
MCV RBC AUTO: 75 FL (ref 78–100)
PLATELET # BLD AUTO: 360 10E3/UL (ref 150–450)
POTASSIUM SERPL-SCNC: 3.3 MMOL/L (ref 3.4–5.3)
PROT SERPL-MCNC: 5.8 G/DL (ref 6.4–8.3)
RBC # BLD AUTO: 4.48 10E6/UL (ref 3.8–5.2)
SODIUM SERPL-SCNC: 140 MMOL/L (ref 135–145)
WBC # BLD AUTO: 6.7 10E3/UL (ref 4–11)

## 2024-09-17 PROCEDURE — 96361 HYDRATE IV INFUSION ADD-ON: CPT

## 2024-09-17 PROCEDURE — 36415 COLL VENOUS BLD VENIPUNCTURE: CPT | Performed by: INTERNAL MEDICINE

## 2024-09-17 PROCEDURE — 250N000009 HC RX 250: Performed by: INTERNAL MEDICINE

## 2024-09-17 PROCEDURE — 82040 ASSAY OF SERUM ALBUMIN: CPT | Performed by: INTERNAL MEDICINE

## 2024-09-17 PROCEDURE — 250N000013 HC RX MED GY IP 250 OP 250 PS 637: Performed by: INTERNAL MEDICINE

## 2024-09-17 PROCEDURE — 258N000003 HC RX IP 258 OP 636: Performed by: INTERNAL MEDICINE

## 2024-09-17 PROCEDURE — 96376 TX/PRO/DX INJ SAME DRUG ADON: CPT

## 2024-09-17 PROCEDURE — 85018 HEMOGLOBIN: CPT | Performed by: INTERNAL MEDICINE

## 2024-09-17 PROCEDURE — 99238 HOSP IP/OBS DSCHRG MGMT 30/<: CPT | Performed by: INTERNAL MEDICINE

## 2024-09-17 PROCEDURE — G0378 HOSPITAL OBSERVATION PER HR: HCPCS

## 2024-09-17 RX ORDER — OMEPRAZOLE 40 MG/1
40 CAPSULE, DELAYED RELEASE ORAL DAILY
Qty: 28 CAPSULE | Refills: 0 | Status: SHIPPED | OUTPATIENT
Start: 2024-09-17 | End: 2024-10-15

## 2024-09-17 RX ADMIN — SUCRALFATE ORAL 1 G: 1 SUSPENSION ORAL at 12:37

## 2024-09-17 RX ADMIN — PANTOPRAZOLE SODIUM 40 MG: 40 INJECTION, POWDER, FOR SOLUTION INTRAVENOUS at 08:58

## 2024-09-17 RX ADMIN — DEXTROSE AND SODIUM CHLORIDE: 5; 900 INJECTION, SOLUTION INTRAVENOUS at 08:59

## 2024-09-17 ASSESSMENT — ACTIVITIES OF DAILY LIVING (ADL)
ADLS_ACUITY_SCORE: 19

## 2024-09-17 NOTE — PLAN OF CARE
"Goal Outcome Evaluation:      Plan of Care Reviewed With: patient    Overall Patient Progress: improvingOverall Patient Progress: improving    VSS. RA. A/Ox3. Denies nausea. 3-5/10 RLQ abdominal pain, tylenol x1 with no relief. Appears comfortable. Scheduled zofran x1. Pt slept throughout shift.    Problem: Adult Inpatient Plan of Care  Goal: Plan of Care Review  Description: The Plan of Care Review/Shift note should be completed every shift.  The Outcome Evaluation is a brief statement about your assessment that the patient is improving, declining, or no change.  This information will be displayed automatically on your shift  note.  Outcome: Progressing  Flowsheets (Taken 9/17/2024 0542)  Plan of Care Reviewed With: patient  Overall Patient Progress: improving  Goal: Patient-Specific Goal (Individualized)  Description: You can add care plan individualizations to a care plan. Examples of Individualization might be:  \"Parent requests to be called daily at 9am for status\", \"I have a hard time hearing out of my right ear\", or \"Do not touch me to wake me up as it startles  me\".  Outcome: Progressing  Goal: Absence of Hospital-Acquired Illness or Injury  Outcome: Progressing  Intervention: Identify and Manage Fall Risk  Recent Flowsheet Documentation  Taken 9/16/2024 2221 by Blanca Kennedy, RN  Safety Promotion/Fall Prevention:   clutter free environment maintained   nonskid shoes/slippers when out of bed   patient and family education   room near nurse's station   room organization consistent   safety round/check completed  Intervention: Prevent Skin Injury  Recent Flowsheet Documentation  Taken 9/16/2024 2221 by Blanca Kennedy, RN  Skin Protection: adhesive use limited  Device Skin Pressure Protection:   adhesive use limited   tubing/devices free from skin contact  Taken 9/16/2024 2041 by Blanca Kennedy, RN  Body Position: position changed independently  Intervention: Prevent Infection  Recent Flowsheet " Documentation  Taken 9/16/2024 2221 by Blanca Kennedy, RN  Infection Prevention:   equipment surfaces disinfected   hand hygiene promoted   rest/sleep promoted   single patient room provided  Goal: Optimal Comfort and Wellbeing  Outcome: Progressing  Intervention: Monitor Pain and Promote Comfort  Recent Flowsheet Documentation  Taken 9/16/2024 2041 by Blanca Kennedy, RN  Pain Management Interventions: medication (see MAR)  Goal: Readiness for Transition of Care  Outcome: Progressing     Problem: Skin Injury Risk Increased  Goal: Skin Health and Integrity  Outcome: Progressing  Intervention: Optimize Skin Protection  Recent Flowsheet Documentation  Taken 9/16/2024 2221 by Blanca Kennedy, RN  Skin Protection: adhesive use limited  Activity Management:   activity adjusted per tolerance   ambulated outside room   ambulated in room   ambulated to bathroom  Taken 9/16/2024 2041 by Blanca Kennedy, RN  Head of Bed (HOB) Positioning: HOB at 45 degrees

## 2024-09-17 NOTE — PLAN OF CARE
"Goal Outcome Evaluation:      Plan of Care Reviewed With: patient    Overall Patient Progress: improving    VSS, up ad mable. Denies pain.Tolerating reg diet. No nausea or vomiting. Went over discharge instructions. Pt verbalized understanding of the instructions and will follow up as directed.     Problem: Adult Inpatient Plan of Care  Goal: Plan of Care Review  Description: The Plan of Care Review/Shift note should be completed every shift.  The Outcome Evaluation is a brief statement about your assessment that the patient is improving, declining, or no change.  This information will be displayed automatically on your shift  note.  Outcome: Adequate for Care Transition  Flowsheets (Taken 9/17/2024 1326)  Plan of Care Reviewed With: patient  Overall Patient Progress: improving  Goal: Patient-Specific Goal (Individualized)  Description: You can add care plan individualizations to a care plan. Examples of Individualization might be:  \"Parent requests to be called daily at 9am for status\", \"I have a hard time hearing out of my right ear\", or \"Do not touch me to wake me up as it startles  me\".  Outcome: Adequate for Care Transition  Goal: Absence of Hospital-Acquired Illness or Injury  Outcome: Adequate for Care Transition  Intervention: Identify and Manage Fall Risk  Recent Flowsheet Documentation  Taken 9/17/2024 0853 by Jennifer Weaver, RN  Safety Promotion/Fall Prevention:   clutter free environment maintained   nonskid shoes/slippers when out of bed   patient and family education   room near nurse's station   room organization consistent   safety round/check completed  Intervention: Prevent Skin Injury  Recent Flowsheet Documentation  Taken 9/17/2024 0853 by Jennifer Weaver, RN  Body Position: position changed independently  Device Skin Pressure Protection:   adhesive use limited   tubing/devices free from skin contact  Goal: Optimal Comfort and Wellbeing  Outcome: Adequate for Care Transition  Goal: Readiness " for Transition of Care  Outcome: Adequate for Care Transition

## 2024-09-17 NOTE — DISCHARGE SUMMARY
Discharge Summary  Hospitalist Service    Hector Mazariegos MRN# [id number]   YOB: 2005 Age: 18 year old     Date of Admission:  9/15/2024  Date of Discharge:  9/17/2024  Admitting Physician:  Tye Moore MD  Discharge Physician: Allison Olson MD  Discharging Service: Hospitalist Service     Primary Provider: No Ref-Primary, Physician  Primary Care Physician Phone Number: None         Discharge Diagnoses/Problem Oriented Hospital Course (Providers):      Hector Mazariegos was admitted on 9/15/2024 by Tye Moore MD and I would refer you to their history and physical.  The following problems were addressed during her hospitalization:    Hyperemesis   Hematemesis  GERD  Elevated LFTs  Anxiety    Summary of Stay: Hector Mazariegos is a 18 year old female with a history of provoked PE after breast reduction surgery, no longer on AC,  obesity, GERD,  admitted on 9/15/2024 with n/v.      Patient has has had ongoing issues for a week, has had 3 ER visits and 2 UC visits. She can't keep anyting down. This started 5 days ago and had some diarrhea with it. She denies THC use, though doing a tox screen was positive.      Denies fever, chills, sweats. 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 In the ER, given antiemtics and fluids. Her lytes are normal with a normal creatinine, lfts up slightly wbc normal. She is not pregnant with a negative HCG, urine analysis negative. She had a CT of the abdomin which was normal         Problem List:   Hyperemesis   2/2 THC gummies.  She's only taken them infrequently and every time this is what happens to her.  Discussed that this will just get worse and can even worsen over time  She plans on not using them again   She's tolerating a regular diet choosing bland small meals.  Feels 100 % better and wanting to discharge home      Hematemesis   Minimal and seems unlikely to be MW tear.    Cont PPI,     GERD  PPI      Elevated LFTs  AST//69, and rapidly improving  I don't  see any prior elevated enzymes so presumably related to her hyperemesis      Anxiety   Not on medications in the OP setting     DVT Prophylaxis: Pneumatic Compression Devices  Code Status: Full Code  Functional Status: independent  Diet: regular  [place]: not needed  Access : PIV         Code Status:      Full Code        Brief Hospital Stay Summary Sent Home With Patient in AVS:          Reason for your hospital stay      Intractable nausea and vomiting                     Important Results:        As noted below          Pending Results:        Unresulted Labs Ordered in the Past 30 Days of this Admission       Date and Time Order Name Status Description    9/16/2024  2:24 PM Helicobacter pylori Antigen Stool In process               Discharge Instructions and Follow-Up:      Follow-up Appointments     Follow-up and recommended labs and tests       Follow-up with PCP clinic re: H pylori results.  Otherwise follow-up as   previously scheduled              Discharge Disposition:        Discharged to home         Discharge Medications:        Current Discharge Medication List        CONTINUE these medications which have CHANGED    Details   omeprazole (PRILOSEC) 40 MG DR capsule Take 1 capsule (40 mg) by mouth daily for 28 days.  Qty: 28 capsule, Refills: 0    Associated Diagnoses: Acute gastritis without hemorrhage, unspecified gastritis type           CONTINUE these medications which have NOT CHANGED    Details   azelastine (ASTELIN) 0.1 % nasal spray Spray 2 sprays into both nostrils 2 times daily  Qty: 30 mL, Refills: 0      clindamycin (CLEOCIN T) 1 % SWAB WIPE FACE EVERY MORNING FOR ACNE      sucralfate (CARAFATE) 1 GM/10ML suspension Take 10 mLs (1 g) by mouth 4 times daily.  Qty: 473 mL, Refills: 0    Associated Diagnoses: PUD (peptic ulcer disease)               Allergies:       No Known Allergies        Consultations This Hospital Stay:        none         Condition and Physical on Discharge:        Discharge  "condition: Stable   Vitals: Blood pressure 129/67, pulse 73, temperature 98.2  F (36.8  C), temperature source Oral, resp. rate 18, height 1.753 m (5' 9\"), weight 108.7 kg (239 lb 10.2 oz), last menstrual period 08/30/2024, SpO2 100%, not currently breastfeeding.     Constitutional: Pleasant nad looks stated age head nc/at sclera clear      Lungs: Normal effort   Cardiovascular: Normal cv status   Abdomen: Tolerating po   Skin: Warm and dry no cyanosis or clubbing    Other: Alert and oriented affect appropriate [place]          Discharge Time:      Less than 30 minutes.        Image Results From This Hospital Stay (For Non-EPIC Providers):        Results for orders placed or performed during the hospital encounter of 09/15/24   CT Abdomen Pelvis w Contrast    Narrative    EXAM: CT ABDOMEN PELVIS W CONTRAST  LOCATION: [facility]  DATE: 9/15/2024    INDICATION: Epigastric abdominal pain.  COMPARISON: None.  TECHNIQUE: CT scan of the abdomen and pelvis was performed following injection of IV contrast. Multiplanar reformats were obtained. Dose reduction techniques were used.  CONTRAST: 100 mL Isovue 370.    FINDINGS:   LOWER CHEST: Normal.    HEPATOBILIARY: No acute abnormality. Fatty deposition at the anterior left liver. No calcified gallstones.    PANCREAS: Normal.    SPLEEN: Normal.    ADRENAL GLANDS: Normal.    KIDNEYS/BLADDER: Normal.    BOWEL: No obstruction or acute inflammatory change. Normal appendix. No abscess or free air.    LYMPH NODES: Normal.    VASCULATURE: Unremarkable.    PELVIC ORGANS: 4.4 cm left ovarian/adnexal cyst, image 170. Trace pelvic fluid may be physiologic.    MUSCULOSKELETAL: Normal.      Impression    IMPRESSION:   1.  Left ovarian/adnexal 4.4 cm cyst.  2.  No acute abnormality otherwise seen.           Most Recent Lab Results In EPIC (For Non-EPIC Providers):    Most Recent 3 CBC's:  Recent Labs   Lab Test 09/17/24  0904 09/16/24  0816 09/16/24  0309 09/15/24  1224 "   WBC 6.7  --  7.5 8.0   HGB 10.0* 10.2* 9.8* 11.3*   MCV 75*  --  74* 73*     --  374 456*      Most Recent 3 BMP's:  Recent Labs   Lab Test 09/17/24  0904 09/16/24  1311 09/16/24  1133 09/16/24  0816 09/15/24  1224     --   --  133* 138   POTASSIUM 3.3*  --   --  3.5 3.4   CHLORIDE 108*  --   --  101 100   CO2 20*  --   --  19* 21*   BUN 3.6*  --   --  6.6 12.2   CR 0.64  --   --  0.66 0.65   ANIONGAP 12  --   --  13 17*   DANIELLA 7.8*  --   --  8.1* 8.8   * 104* 64* 67* 74     Most Recent 2 LFT's:  Recent Labs   Lab Test 09/17/24  0904 09/16/24  0816   AST 22 32   ALT 51* 76*   ALKPHOS 70 66   BILITOTAL 0.4 0.7

## 2024-09-18 ENCOUNTER — PATIENT OUTREACH (OUTPATIENT)
Dept: CARE COORDINATION | Facility: CLINIC | Age: 19
End: 2024-09-18
Payer: COMMERCIAL

## 2024-09-18 ENCOUNTER — TELEPHONE (OUTPATIENT)
Dept: GASTROENTEROLOGY | Facility: CLINIC | Age: 19
End: 2024-09-18

## 2024-09-18 NOTE — PROGRESS NOTES
[facility]: Transitions of Care Outreach  Chief Complaint   Patient presents with    Clinic Care Coordination - Post Hospital       Most Recent Admission Date: 9/15/2024   Most Recent Admission Diagnosis: Acute gastritis without hemorrhage, unspecified gastritis type - K29.00     Most Recent Discharge Date: 9/17/2024   Most Recent Discharge Diagnosis: Acute gastritis without hemorrhage, unspecified gastritis type - K29.00     Transitions of Care Assessment    Discharge Assessment  How are you doing now that you are home?: I am feeling a bit better. My pain is a 3/10.  How are your symptoms? (Red Flag symptoms escalate to triage hotline per guidelines): Improved  Do you know how to contact your clinic care team if you have future questions or changes to your health status? : Yes  Does the patient have their discharge instructions? : Yes  Does the patient have questions regarding their discharge instructions? : No  Were you started on any new medications or were there changes to any of your previous medications? : Yes  Does the patient have all of their medications?: Yes  Do you have questions regarding any of your medications? : No                  Follow up Plan     Discharge Follow-Up  Discharge follow up appointment scheduled in alignment with recommended follow up timeframe or Transitions of Risk Category? (Low = within 30 days; Moderate= within 14 days; High= within 7 days): No  Patient's follow up appointment not scheduled: Patient declined scheduling support. Education on the importance of transitions of care follow up. Provided scheduling phone number.    No future appointments.    Outpatient Plan as outlined on AVS reviewed with patient.    For any urgent concerns, please contact our 24 hour nurse triage line: [phone] ([phone])       LONNIE Mathis  [phone]  [facility]

## 2024-09-18 NOTE — TELEPHONE ENCOUNTER
[facility] Call Center    Phone Message    May a detailed message be left on voicemail: Yes    Reason for Call: Other: Patient is currently scheduled on 11/25, as visit type New GI Urgent. This is outside the expected timeline for this referral. Patient has been added to the waitlist.      Action Taken: Message routed to:  Other: GI REFERRAL TRIAGE POOL     Travel Screening: Not Applicable

## 2024-09-25 NOTE — TELEPHONE ENCOUNTER
Clinic Navigator sent a [employer] message to inform the Pt that Pt is on the wait list for a sooner appointment. Clinic Navigator will reach out to the Pt via phone call or [employer] when a sooner appointment becomes available.

## 2024-11-24 NOTE — PROGRESS NOTES
Virtual Visit Details    Type of service:  Video Visit   Video Start Time: 10:30 AM  Video End Time:10:44 AM    Originating Location (pt. Location): Home    Distant Location (provider location):  Off-site  Platform used for Video Visit: [facility]      GI CLINIC VISIT    CC/REFERRING MD:  Moni Mendes      ASSESSMENT/PLAN:    #abdominal pain  #nausea and vomiting  #+H.Pylori   Patient began to have issues with nausea, vomiting, and abdominal pain which began in September 2024, she did have a CT abd/pelvis as well as abdominal US which were unrevealing (did see gallbladder sludge). She also tested positive for H.Pylori, and has not received treatment for this yet. Symptoms have improved, will use omeprazole 20mg as needed, nausea has improved as well. At this time will plan to have her meet with [facility] pharmacist to discuss treatment options for h.pylori. given abdominal pain, nausea, and vomiting obtaining an EGD is reasonable as well, although suspect symptoms likely secondary to H.Pylori infection. She can always cancel the EGD if symptoms resolve with treatment. Discussed use of omeprazole 20mg daily, as well as reflux lifestyle precautions. Recommended abstaining from all marijuana use. If no improvement with treatment of H.Pylori and EGD is unrevealing, could consider HIDA scan and GES.   --schedule appt with MTM pharmacist for H.pylori treatment.   --schedule EGD.   --start taking omeprazole 20mg daily.    #constipation  Patient describes a bowel pattern consistent with constipation - recommended increase use of fiber and a stool softener as miralax has caused her to have abdominal pain in the past. Reviewed constipation lifestyle modifications.        RTC 3 months    Thank you for this consultation.  It was a pleasure to participate in the care of this patient; please contact us with any further questions.       30 minutes spent on the date of the encounter doing chart review, review of outside records, review  of test results, patient visit, and documentation    This note was created with voice recognition software, and while reviewed for accuracy, typos may remain.    Micki Clark PA-C  Division of Gastroenterology, Hepatology and Nutrition  [facility]  19 y/o F (Mu-wa-hib) presents for evaluation of abdominal pain, nausea, and vomiting.     Patient was admitted to the hospital on 9/15/24 for ongoing nausea and vomiting, prior to this had ongoing issues for about a week, which led to 3 ER visits and 2 UC visits. She had an abdominal US which showed biliary sludge, but otherwise a gallbladder WNL as well as a CT abd/pelvis which was otherwise unrevealing. Tox screen was positive for THC.      Patient has has had ongoing issues for a week, has had 3 ER visits and 2 UC visits. She can't keep anyting down. This started 5 days ago and had some diarrhea with it. She denies THC use, though doing a tox screen was positive.     Patient reports that she was generally feeling well until September 2024 - but one night after eating, the following morning, she had horrible pain in her lower abdomen, with associated nausea and vomiting. The only time she would feel relief was with vomiting. Patient reports that symptoms have definitely improved since September - but she will still have burning in her lower and middle abdomen - this will occur if she does not eat, and can occur every couple of days.   She does take omeprazole 20mg as needed, and feels this is helpful. She will feel nauseous a couple times, no vomiting. Patient feels that symptoms began to improve two weeks ago.    In regards to her bowel movements, she will generally have a BM 3x/week - stool is hard in nature. Denies BRBPR. She does take fiber 2-3x/week. Patient used to take miralax when she was younger, but caused her stomach pain.     Denies frequent NSAIDs or Tylenol. Denies use of OTC herbal supplements/weight  loss products.      Denies use of ETOH and tobacco products. Patient has used marijuana in the past, last time she used was in September 2024.     No family history of GI related malignancy (esophageal, gastric, pancreatic, liver or colon) or family history of IBD/celiac disease.       ROS:    No fevers or chills  No weight loss  No shortness of breath or wheezing  No chest pain or pressure  No odynophagia or dysphagia  No BRBPR, hematochezia, melena  No anxiety or depression      PROBLEM LIST  Patient Active Problem List    Diagnosis Date Noted    Acute gastritis without hemorrhage, unspecified gastritis type 09/15/2024     Priority: Medium    Acute pulmonary embolism without acute cor pulmonale, unspecified pulmonary embolism type (H) 02/15/2023     Priority: Medium    Other acne 01/03/2022     Priority: Medium     Follows up with dermatologist       Anxiety 01/03/2022     Priority: Medium    INTOEING 01/09/2007     Priority: Medium    Obesity 01/09/2007     Priority: Medium     Problem list name updated by automated process. Provider to review         PERTINENT PAST MEDICAL HISTORY:    No past medical history on file.    PREVIOUS SURGERIES:    No past surgical history on file.    PREVIOUS ENDOSCOPY:  None.    ALLERGIES:   No Known Allergies    PERTINENT MEDICATIONS:    Current Outpatient Medications:     azelastine (ASTELIN) 0.1 % nasal spray, Spray 2 sprays into both nostrils 2 times daily, Disp: 30 mL, Rfl: 0    clindamycin (CLEOCIN T) 1 % SWAB, WIPE FACE EVERY MORNING FOR ACNE, Disp: , Rfl:     sucralfate (CARAFATE) 1 GM/10ML suspension, Take 10 mLs (1 g) by mouth 4 times daily., Disp: 473 mL, Rfl: 0    SOCIAL HISTORY:    Social History     Socioeconomic History    Marital status: Single     Spouse name: Not on file    Number of children: Not on file    Years of education: Not on file    Highest education level: Not on file   Occupational History    Not on file   Tobacco Use    Smoking status: Never     Smokeless tobacco: Never   Vaping Use    Vaping status: Never Used   Substance and Sexual Activity    Alcohol use: No    Drug use: No    Sexual activity: Not on file   Other Topics Concern    Not on file   Social History Narrative    Not on file     Social Drivers of Health     Financial Resource Strain: Low Risk  (9/15/2024)    Financial Resource Strain     Within the past 12 months, have you or your family members you live with been unable to get utilities (heat, electricity) when it was really needed?: No   Food Insecurity: Low Risk  (9/15/2024)    Food Insecurity     Within the past 12 months, did you worry that your food would run out before you got money to buy more?: No     Within the past 12 months, did the food you bought just not last and you didn t have money to get more?: No   Transportation Needs: Low Risk  (9/15/2024)    Transportation Needs     Within the past 12 months, has lack of transportation kept you from medical appointments, getting your medicines, non-medical meetings or appointments, work, or from getting things that you need?: No   Physical Activity: Not on file   Stress: Not on file   Social Connections: Not on file   Interpersonal Safety: Not on file   Housing Stability: Low Risk  (9/15/2024)    Housing Stability     Do you have housing? : Yes     Are you worried about losing your housing?: No       FAMILY HISTORY:  FH of CRC: none  FH of IBD: none  Family History   Problem Relation Age of Onset    Diabetes Mother     Diabetes Brother     Alzheimer Disease Paternal Grandmother        Past/family/social history reviewed and no changes    PHYSICAL EXAMINATION:  General: Patient appears well in no acute distress.   Skin: No visualized rash or lesions on visualized skin  Eyes: EOMI, no erythema, sclera icterus or discharge noted  Resp: Appears to be breathing comfortably without accessory muscle usage, speaking in full sentences, no cough  MSK: Appears to have normal range of motion based on  visualized movements  Neurologic: No apparent tremors, facial movements symmetric  Psych: normal affect , alert and oriented

## 2024-11-25 ENCOUNTER — VIRTUAL VISIT (OUTPATIENT)
Dept: GASTROENTEROLOGY | Facility: CLINIC | Age: 19
End: 2024-11-25
Payer: COMMERCIAL

## 2024-11-25 DIAGNOSIS — R11.2 NAUSEA AND VOMITING, UNSPECIFIED VOMITING TYPE: Primary | ICD-10-CM

## 2024-11-25 DIAGNOSIS — A04.8 BACTERIAL INFECTION DUE TO H. PYLORI: ICD-10-CM

## 2024-11-25 DIAGNOSIS — R10.84 ABDOMINAL PAIN, GENERALIZED: ICD-10-CM

## 2024-11-25 DIAGNOSIS — K59.00 CONSTIPATION, UNSPECIFIED CONSTIPATION TYPE: ICD-10-CM

## 2024-11-25 PROCEDURE — 99203 OFFICE O/P NEW LOW 30 MIN: CPT | Mod: 95 | Performed by: PHYSICIAN ASSISTANT

## 2024-11-25 ASSESSMENT — PAIN SCALES - GENERAL: PAINLEVEL_OUTOF10: NO PAIN (0)

## 2024-11-25 NOTE — NURSING NOTE
Current patient location: [address] [place] [address]   [facility] [address]    Is the patient currently in the state of [place]? YES    Visit mode:VIDEO    If the visit is dropped, the patient can be reconnected by:VIDEO VISIT: Text to cell phone:   Telephone Information:   Mobile [phone]       Will anyone else be joining the visit? NO  (If patient encounters technical issues they should call [phone]    Are changes needed to the allergy or medication list? Pt stated no changes to allergies and Pt stated no med changes    Are refills needed on medications prescribed by this physician? NO    Rooming Documentation:  Questionnaire(s) not pre-assigned    Reason for visit: Consult    Olga QUARLESF

## 2024-11-25 NOTE — PATIENT INSTRUCTIONS
"It was a pleasure meeting with you today and discussing your healthcare plan. Below is a summary of what we covered:    --please start taking omeprazole 20mg daily, 30-60 minutes before your first meal in the morning.   --please schedule an appointment with our pharmacist to discuss H.Pylori treatment, they will reach out to you.   --schedule upper endoscopy.   --Lifestyle modifications for gastroesophageal reflux disease (GERD).     1. Change your eating habits.  -- It's best to eat several small meals instead of two or three large meals.  -- After you eat, wait 2 to 3 hours before you lie down. Late-night snacks aren't a good idea.  -- Chocolate, mint, and alcohol can make GERD worse. They relax the valve between the esophagus and the stomach.  -- Spicy foods, fatty foods, foods that have a lot of acid (like tomatoes and oranges), and coffee can make GERD symptoms worse in some people. If your symptoms are worse after you eat a certain food, you may want to stop eating that food to see if your symptoms get better.    2. Do not smoke or chew tobacco.    3. If you have GERD symptoms at night, raise the head of your bed 6 in. (15 cm) to 8 in. (20 cm) by putting the frame on blocks or placing a foam wedge under the head of your mattress. (Adding extra pillows does not work.)    4. Avoid or reduce pressure on your stomach. Don't wear tight clothing around your middle.    5. Lose weight if you need to. Losing just 5 to 10 pounds can help.    --complete avoidance of marijuana is important.  --constipation may be playing a role in your abdominal pain, would recommend increasing fiber to two tablespoons daily, could consider trial of a stool softener (like colace)    Other interventions for constipation:  -- Stay well hydrated with at least 64 oz fluids daily  -- Stay active with goal 150 minutes mild to moderate activity per week  -- Trial of a \"squatty potty\" - this is a glorified stool that you rest our feet on while " having a bowel movement, which can help with improving defecation      Follow up in GI clinic in 3 months      Please see below for any additional questions and scheduling guidelines.    Sign up for [employer]: [employer] patient portal serves as a secure platform for accessing your medical records from the [facility]. Additionally, [employer] facilitates easy, timely, and secure messaging with your care team. If you have not signed up, you may do so by using the provided code or calling [phone].    Coordinating your care after your visit:  There are multiple options for scheduling your follow-up care based on your provider's recommendation.    How do I schedule a follow-up clinic appointment:   After your appointment, you may receive scheduling assistance with the Clinic Coordinators by having a seat in the waiting room and a Clinic Coordinator will call you up to schedule.  Virtual visits or after you leave the clinic:  Your provider has placed a follow-up order in the [employer] portal for scheduling your return appointment. A member of the scheduling team will contact you to schedule.  [employer] Scheduling: Timely scheduling through [employer] is advised to ensure appointment availability.   Call to schedule: You may schedule your follow-up appointment(s) by calling [phone], option 1.    How do I schedule my endoscopy or colonoscopy procedure:  If a procedure, such as a colonoscopy or upper endoscopy was ordered by your provider, the scheduling team will contact you to schedule this procedure. Or you may choose to call to schedule at   [phone], option 2.  Please allow 20-30 minutes when scheduling a procedure.    How do I get my blood work done? To get your blood work done, you need to schedule a lab appointment at an [facility]. There are multiple ways to schedule:   At the clinic: The Clinic Coordinator you meet after your visit can help you schedule a lab appointment.   [employer]  scheduling: [employer] offers online lab scheduling at all [facility] laboratory locations.   Call to schedule: You can call [phone] to schedule your lab appointment.    How do I schedule my imaging study: To schedule imaging studies, such as CT scans, ultrasounds, MRIs, or X-rays, contact Imaging Services at [phone].    How do I schedule a referral to another doctor: If your provider recommended a referral to another specialist(s), the referral order was placed by your provider. You will receive a phone call to schedule this referral, or you may choose to call the number attached to the referral to self-schedule.    For Post-Visit Question(s):  For any inquiries following today's visit:  Please utilize [employer] messaging and allow 48 hours for reply or contact the Call Center during normal business hours at [phone], option 3.  For Emergent After-hours questions, contact the On-Call GI Fellow through the [facility]  at [phone].  In addition, you may contact your Nurse directly using the provided contact information.    Test Results:  Test results will be accessible via [employer] in compliance with the 21st Century Cures Act. This means that your results will be available to you at the same time as your provider. Often you may see your results before your provider does. Results are reviewed by staff within two weeks with communication follow-up. Results may be released in the patient portal prior to your care team review.    Prescription Refill(s):  Medication prescribed by your provider will be addressed during your visit. For future refills, please coordinate with your pharmacy. If you have not had a recent clinic visit or routine labs, for your safety, your provider may not be able to refill your prescription.

## 2024-12-02 ENCOUNTER — VIRTUAL VISIT (OUTPATIENT)
Dept: PHARMACY | Facility: CLINIC | Age: 19
End: 2024-12-02
Attending: PHYSICIAN ASSISTANT
Payer: COMMERCIAL

## 2024-12-02 VITALS — BODY MASS INDEX: 32.58 KG/M2 | WEIGHT: 220 LBS | HEIGHT: 69 IN

## 2024-12-02 DIAGNOSIS — A04.8 H. PYLORI INFECTION: Primary | ICD-10-CM

## 2024-12-02 DIAGNOSIS — T78.40XA ALLERGY: ICD-10-CM

## 2024-12-02 DIAGNOSIS — Z78.9 TAKES DIETARY SUPPLEMENTS: ICD-10-CM

## 2024-12-02 RX ORDER — TETRACYCLINE HYDROCHLORIDE 500 MG/1
500 CAPSULE ORAL 4 TIMES DAILY
Qty: 56 CAPSULE | Refills: 0 | Status: SHIPPED | OUTPATIENT
Start: 2024-12-02

## 2024-12-02 RX ORDER — METRONIDAZOLE 250 MG/1
250 TABLET ORAL 4 TIMES DAILY
Qty: 56 TABLET | Refills: 0 | Status: SHIPPED | OUTPATIENT
Start: 2024-12-02

## 2024-12-02 RX ORDER — BISMUTH SUBSALICYLATE 262 MG/1
2 TABLET, CHEWABLE ORAL
Qty: 112 TABLET | Refills: 0 | Status: SHIPPED | OUTPATIENT
Start: 2024-12-02

## 2024-12-02 ASSESSMENT — PAIN SCALES - GENERAL: PAINLEVEL_OUTOF10: NO PAIN (0)

## 2024-12-02 NOTE — PROGRESS NOTES
Medication Therapy Management (MTM) Encounter    ASSESSMENT:                            Medication Adherence/Access: No issues identified.    H. Pylori:  We reviewed H pylori today including background information, indications for treatment and potential modes of transmission. We also discussed potential for antibiotic resistance and importance of finishing treatment if able, as well as follow-up testing. Discussed bismuth quadruple therapy today including medications, dosing, side effects, precautions and general counseling information.    Hector would benefit from first-line Bismuth Quadruple therapy for treatment of H. Pylori. There is no renal or hepatic function dose adjustments required. There is a drug-drug interaction between her multivitamin and the tetracycline. We discussed holding the multivitamin during treatment and Hector is agreeable. She will be due for eradication testing no sooner than 4 weeks after completion of their antibiotics. She should not take PPIs for at least 1-2 weeks prior to eradication testing.    Allergies:  Stable    Supplements:  Recommended holding multivitamin during H. Pylori treatment due to drug-drug interaction with tetracycline. Hector agrees to hold this.        PLAN:                            I recommend treatment with bismuth quadruple therapy x 14 days:  Bismuth subsalicylate 262 mg tablets Take 2 tablets (524 mg) by mouth four times daily  Tetracycline 500 mg Take 1 tablet by mouth four times daily  Metronidazole 250 mg Take 1 tablet by mouth four times daily  Omeprazole 20 mg Take 1 capsule by mouth twice daily 30-60 minutes before morning and evening meal  Do not take any multivitamins or dairy while on these antibiotics as they can make the tetracycline less effective.   You will be due for stool testing to make sure the H. Pylori is gone no sooner than 4 weeks after completion of your antibiotics. You must be off PPIs such as omeprazole for at least 1-2 weeks  prior to eradication testing.     Follow-up: 12/13/2024 at 9:00 AM    SUBJECTIVE/OBJECTIVE:                          Hector Mazariegos is a 19 year old female seen for an initial visit. She was referred to me from Micki Clark PA-C.      Reason for visit: H Pylori treatment .    Allergies/ADRs: None  Past Medical History: Reviewed in chart  Tobacco: She reports that she has never smoked. She has never been exposed to tobacco smoke. She has never used smokeless tobacco.  Alcohol: none    Medication Adherence/Access: no issues reported.    H. Pylori Infection:   Omeprazole 20 mg daily as needed    Hector was referred to me to discuss treatment for H. Pylori. She currently complains of a burning sensation in her stomach and throat with and without regard to food intake. She was prescribed omeprazole and reports this is somewhat helpful for her symptoms but it does not completely control them. She also reports occasional abdominal pain.     She denies question or concerns regarding treatment. She is agreeable to taking medications four times per day and notes that she will set an alarm to help her remember. We discussed the importance of treatment and the risk of potential reinfection.     Diagnosis by stool test on 9/16/2024  Previous treatment regimens: none  Previous Macrolide exposure: Yes - per chart review  Penicillin Allergy: no  Pregnant/breastfeeding: no  KIDNEY: No known chronic kidney disease  LIVER: No known chronic liver disease  Drug-drug interactions: No dairy, multivitamins or antacids     GFR Estimate   Date Value Ref Range Status   09/17/2024 >90 >60 mL/min/1.73m2 Final     Comment:     eGFR calculated using 2021 CKD-EPI equation.   08/15/2019 GFR not calculated, patient <18 years old. >60 mL/min/[1.73_m2] Final     Comment:     Non  GFR Calc  Starting 12/18/2018, serum creatinine based estimated GFR (eGFR) will be   calculated using the Chronic Kidney Disease Epidemiology Collaboration    (CKD-EPI) equation.       Allergies:  Azelastine 0.1% nasal spray 2 sprays twice daily as needed    Only uses when she is sick. No recent use. Denies medication concerns.    Supplements:  Multivitamin 1 tablet daily    We discussed interaction between the calcium in her multivitamin and the tetracycline in her H. Pylori treatment regimen.     ----------------    I spent 17 minutes with this patient today. All changes were made via collaborative practice agreement with Micki Clark. A copy of the visit note is available to the patient provider(s) within the patient medical record.    A summary of these recommendations was sent via [employer].    Sarah Figueroa PharmD, BCPS  [place] Pharmacist   [facility]   Phone: [phone]    Telemedicine Visit Details  The patient's medications can be safely assessed via a telemedicine encounter.  Type of service:  Video Conference via [employer]  Originating Location (pt. Location): Home    Distant Location (provider location):  Off-site  Start Time: 1:35 PM  End Time: 1:52 PM     Medication Therapy Recommendations  No medication therapy recommendations to display

## 2024-12-02 NOTE — PATIENT INSTRUCTIONS
"Recommendations from today's MTM visit:                                                      I recommend treatment with bismuth quadruple therapy x 14 days:  Bismuth subsalicylate 262 mg tablets Take 2 tablets (524 mg) by mouth four times daily  Tetracycline 500 mg Take 1 tablet by mouth four times daily  Metronidazole 250 mg Take 1 tablet by mouth four times daily  Omeprazole 20 mg Take 1 capsule by mouth twice daily 30-60 minutes before morning and evening meal  Do not take any multivitamins or dairy while on these antibiotics as they can make the tetracycline less effective.   You will be due for stool testing to make sure the H. Pylori is gone no sooner than 4 weeks after completion of your antibiotics. You must be off PPIs such as omeprazole for at least 1-2 weeks prior to eradication testing.     Follow-up: 12/13/2024 at 9:00 AM    It was great speaking with you today.  I value your experience and would be very thankful for your time in providing feedback in our clinic survey. In the next few days, you may receive an email or text message from [facility] [employer] with a link to a survey related to your  clinical pharmacist.\"     To schedule another MTM appointment, please call the clinic directly or you may call the MTM scheduling line at [phone].    My Clinical Pharmacist's contact information:                                                      Please feel free to contact me with any questions or concerns you have.      Sarah Figueroa PharmD, BCPS  [facility] Pharmacist   [facility]   Phone: [phone]   "

## 2024-12-02 NOTE — NURSING NOTE
Current patient location: [address] [place] [address]   [facility] [address]    Is the patient currently in the state of [place]? YES    Visit mode:VIDEO    If the visit is dropped, the patient can be reconnected by:VIDEO VISIT: Text to cell phone:   Telephone Information:   Mobile [phone]       Will anyone else be joining the visit? NO  (If patient encounters technical issues they should call [phone]    Are changes needed to the allergy or medication list? No    Are refills needed on medications prescribed by this physician? NO    Rooming Documentation:  Questionnaire(s) not done per department protocol    Reason for visit: Consult    Karen KURTZ

## 2024-12-17 ENCOUNTER — TELEPHONE (OUTPATIENT)
Dept: GASTROENTEROLOGY | Facility: CLINIC | Age: 19
End: 2024-12-17
Payer: COMMERCIAL

## 2024-12-17 NOTE — TELEPHONE ENCOUNTER
MTM appointment no showed, we made one more attempt to reschedule.     Routing back to referring provider and MTM Pharmacist Team    Niral[facility]

## 2025-01-02 ENCOUNTER — TELEPHONE (OUTPATIENT)
Dept: PHARMACY | Facility: CLINIC | Age: 20
End: 2025-01-02
Payer: COMMERCIAL

## 2025-01-02 NOTE — TELEPHONE ENCOUNTER
Pt is due for follow up with Sarah BAILEY     Call placed to pt to initiate scheduling on 1/2/25    Outcome: LVM and MyC    *Since this was the second attempt to contact pt for 1week appt, will defer to Sarah on next appropriate follow up date.*

## 2025-01-04 ENCOUNTER — HEALTH MAINTENANCE LETTER (OUTPATIENT)
Age: 20
End: 2025-01-04

## 2025-01-17 ENCOUNTER — HOSPITAL ENCOUNTER (EMERGENCY)
Facility: CLINIC | Age: 20
Discharge: HOME | End: 2025-01-17
Attending: FAMILY MEDICINE | Admitting: FAMILY MEDICINE
Payer: COMMERCIAL

## 2025-01-17 ENCOUNTER — APPOINTMENT (OUTPATIENT)
Dept: ULTRASOUND IMAGING | Facility: CLINIC | Age: 20
End: 2025-01-17
Attending: FAMILY MEDICINE
Payer: COMMERCIAL

## 2025-01-17 VITALS
OXYGEN SATURATION: 50 % | DIASTOLIC BLOOD PRESSURE: 89 MMHG | BODY MASS INDEX: 37.75 KG/M2 | TEMPERATURE: 98.6 F | HEIGHT: 69 IN | RESPIRATION RATE: 16 BRPM | HEART RATE: 58 BPM | WEIGHT: 254.9 LBS | SYSTOLIC BLOOD PRESSURE: 138 MMHG

## 2025-01-17 DIAGNOSIS — N94.6 DYSMENORRHEA: ICD-10-CM

## 2025-01-17 DIAGNOSIS — R11.2 INTRACTABLE NAUSEA AND VOMITING: ICD-10-CM

## 2025-01-17 LAB
ALBUMIN SERPL BCG-MCNC: 4.4 G/DL (ref 3.5–5.2)
ALP SERPL-CCNC: 103 U/L (ref 40–150)
ALT SERPL W P-5'-P-CCNC: ABNORMAL U/L
ANION GAP SERPL CALCULATED.3IONS-SCNC: 16 MMOL/L (ref 7–15)
AST SERPL W P-5'-P-CCNC: ABNORMAL U/L
BASOPHILS # BLD AUTO: 0 10E3/UL (ref 0–0.2)
BASOPHILS NFR BLD AUTO: 0 %
BILIRUB SERPL-MCNC: 0.3 MG/DL
BUN SERPL-MCNC: 12.9 MG/DL (ref 6–20)
CALCIUM SERPL-MCNC: 9.2 MG/DL (ref 8.8–10.4)
CHLORIDE SERPL-SCNC: 103 MMOL/L (ref 98–107)
CREAT SERPL-MCNC: 0.64 MG/DL (ref 0.51–0.95)
EGFRCR SERPLBLD CKD-EPI 2021: >90 ML/MIN/1.73M2
EOSINOPHIL # BLD AUTO: 0 10E3/UL (ref 0–0.7)
EOSINOPHIL NFR BLD AUTO: 0 %
ERYTHROCYTE [DISTWIDTH] IN BLOOD BY AUTOMATED COUNT: 18.6 % (ref 10–15)
GLUCOSE SERPL-MCNC: 117 MG/DL (ref 70–99)
HCG SERPL QL: NEGATIVE
HCO3 SERPL-SCNC: 21 MMOL/L (ref 22–29)
HCT VFR BLD AUTO: 36 % (ref 35–47)
HGB BLD-MCNC: 10.9 G/DL (ref 11.7–15.7)
IMM GRANULOCYTES # BLD: 0 10E3/UL
IMM GRANULOCYTES NFR BLD: 0 %
LYMPHOCYTES # BLD AUTO: 1.9 10E3/UL (ref 0.8–5.3)
LYMPHOCYTES NFR BLD AUTO: 26 %
MCH RBC QN AUTO: 21.9 PG (ref 26.5–33)
MCHC RBC AUTO-ENTMCNC: 30.3 G/DL (ref 31.5–36.5)
MCV RBC AUTO: 72 FL (ref 78–100)
MONOCYTES # BLD AUTO: 0.3 10E3/UL (ref 0–1.3)
MONOCYTES NFR BLD AUTO: 3 %
NEUTROPHILS # BLD AUTO: 5.1 10E3/UL (ref 1.6–8.3)
NEUTROPHILS NFR BLD AUTO: 70 %
NRBC # BLD AUTO: 0 10E3/UL
NRBC BLD AUTO-RTO: 0 /100
PLATELET # BLD AUTO: 461 10E3/UL (ref 150–450)
POTASSIUM SERPL-SCNC: 4.6 MMOL/L (ref 3.4–5.3)
PROT SERPL-MCNC: 7.7 G/DL (ref 6.4–8.3)
RBC # BLD AUTO: 4.97 10E6/UL (ref 3.8–5.2)
SODIUM SERPL-SCNC: 140 MMOL/L (ref 135–145)
WBC # BLD AUTO: 7.4 10E3/UL (ref 4–11)

## 2025-01-17 PROCEDURE — 250N000011 HC RX IP 250 OP 636: Performed by: FAMILY MEDICINE

## 2025-01-17 PROCEDURE — 93005 ELECTROCARDIOGRAM TRACING: CPT | Performed by: FAMILY MEDICINE

## 2025-01-17 PROCEDURE — 84155 ASSAY OF PROTEIN SERUM: CPT | Performed by: FAMILY MEDICINE

## 2025-01-17 PROCEDURE — 93976 VASCULAR STUDY: CPT | Mod: XU

## 2025-01-17 PROCEDURE — 99285 EMERGENCY DEPT VISIT HI MDM: CPT | Performed by: FAMILY MEDICINE

## 2025-01-17 PROCEDURE — 84703 CHORIONIC GONADOTROPIN ASSAY: CPT | Performed by: FAMILY MEDICINE

## 2025-01-17 PROCEDURE — 96361 HYDRATE IV INFUSION ADD-ON: CPT | Performed by: FAMILY MEDICINE

## 2025-01-17 PROCEDURE — 93010 ELECTROCARDIOGRAM REPORT: CPT | Performed by: FAMILY MEDICINE

## 2025-01-17 PROCEDURE — 96374 THER/PROPH/DIAG INJ IV PUSH: CPT | Performed by: FAMILY MEDICINE

## 2025-01-17 PROCEDURE — 258N000003 HC RX IP 258 OP 636: Performed by: FAMILY MEDICINE

## 2025-01-17 PROCEDURE — 85004 AUTOMATED DIFF WBC COUNT: CPT | Performed by: FAMILY MEDICINE

## 2025-01-17 PROCEDURE — 250N000009 HC RX 250: Performed by: FAMILY MEDICINE

## 2025-01-17 PROCEDURE — 96375 TX/PRO/DX INJ NEW DRUG ADDON: CPT | Performed by: FAMILY MEDICINE

## 2025-01-17 PROCEDURE — 36415 COLL VENOUS BLD VENIPUNCTURE: CPT | Performed by: FAMILY MEDICINE

## 2025-01-17 PROCEDURE — 96376 TX/PRO/DX INJ SAME DRUG ADON: CPT | Performed by: FAMILY MEDICINE

## 2025-01-17 PROCEDURE — 99285 EMERGENCY DEPT VISIT HI MDM: CPT | Mod: 25 | Performed by: FAMILY MEDICINE

## 2025-01-17 RX ORDER — SUCRALFATE ORAL 1 G/10ML
1 SUSPENSION ORAL 4 TIMES DAILY
Qty: 473 ML | Refills: 0 | Status: SHIPPED | OUTPATIENT
Start: 2025-01-17

## 2025-01-17 RX ORDER — METRONIDAZOLE 500 MG/1
250 TABLET ORAL 4 TIMES DAILY
Qty: 56 TABLET | Refills: 0 | Status: SHIPPED | OUTPATIENT
Start: 2025-01-17 | End: 2025-01-21

## 2025-01-17 RX ORDER — HYDROMORPHONE HYDROCHLORIDE 1 MG/ML
0.5 INJECTION, SOLUTION INTRAMUSCULAR; INTRAVENOUS; SUBCUTANEOUS ONCE
Status: COMPLETED | OUTPATIENT
Start: 2025-01-17 | End: 2025-01-17

## 2025-01-17 RX ORDER — METRONIDAZOLE 500 MG/1
250 TABLET ORAL 4 TIMES DAILY
Qty: 56 TABLET | Refills: 0 | Status: SHIPPED | OUTPATIENT
Start: 2025-01-17 | End: 2025-01-17

## 2025-01-17 RX ORDER — DIPHENHYDRAMINE HYDROCHLORIDE 50 MG/ML
25 INJECTION INTRAMUSCULAR; INTRAVENOUS ONCE
Status: COMPLETED | OUTPATIENT
Start: 2025-01-17 | End: 2025-01-17

## 2025-01-17 RX ORDER — HYDROMORPHONE HCL IN WATER/PF 6 MG/30 ML
0.2 PATIENT CONTROLLED ANALGESIA SYRINGE INTRAVENOUS ONCE
Status: COMPLETED | OUTPATIENT
Start: 2025-01-17 | End: 2025-01-17

## 2025-01-17 RX ORDER — BISMUTH SUBSALICYLATE 262 MG/1
2 TABLET, CHEWABLE ORAL
Qty: 112 TABLET | Refills: 0 | Status: SHIPPED | OUTPATIENT
Start: 2025-01-17 | End: 2025-01-21

## 2025-01-17 RX ORDER — KETOROLAC TROMETHAMINE 15 MG/ML
15 INJECTION, SOLUTION INTRAMUSCULAR; INTRAVENOUS ONCE
Status: COMPLETED | OUTPATIENT
Start: 2025-01-17 | End: 2025-01-17

## 2025-01-17 RX ORDER — TETRACYCLINE HYDROCHLORIDE 500 MG/1
500 CAPSULE ORAL 4 TIMES DAILY
Qty: 56 CAPSULE | Refills: 0 | Status: SHIPPED | OUTPATIENT
Start: 2025-01-17 | End: 2025-01-17

## 2025-01-17 RX ORDER — KETOROLAC TROMETHAMINE 15 MG/ML
15 INJECTION, SOLUTION INTRAMUSCULAR; INTRAVENOUS ONCE
Status: DISCONTINUED | OUTPATIENT
Start: 2025-01-17 | End: 2025-01-17

## 2025-01-17 RX ORDER — TETRACYCLINE HYDROCHLORIDE 500 MG/1
500 CAPSULE ORAL 4 TIMES DAILY
Qty: 56 CAPSULE | Refills: 0 | Status: SHIPPED | OUTPATIENT
Start: 2025-01-17 | End: 2025-01-21

## 2025-01-17 RX ORDER — DEXTROSE MONOHYDRATE AND SODIUM CHLORIDE 5; .9 G/100ML; G/100ML
INJECTION, SOLUTION INTRAVENOUS CONTINUOUS
Status: DISCONTINUED | OUTPATIENT
Start: 2025-01-17 | End: 2025-01-17 | Stop reason: HOSPADM

## 2025-01-17 RX ORDER — OLANZAPINE 10 MG/1
2.5 INJECTION, POWDER, LYOPHILIZED, FOR SOLUTION INTRAMUSCULAR ONCE
Status: COMPLETED | OUTPATIENT
Start: 2025-01-17 | End: 2025-01-17

## 2025-01-17 RX ORDER — METOCLOPRAMIDE HYDROCHLORIDE 5 MG/ML
5 INJECTION INTRAMUSCULAR; INTRAVENOUS ONCE
Status: COMPLETED | OUTPATIENT
Start: 2025-01-17 | End: 2025-01-17

## 2025-01-17 RX ORDER — BISMUTH SUBSALICYLATE 262 MG/1
2 TABLET, CHEWABLE ORAL
Qty: 112 TABLET | Refills: 0 | Status: SHIPPED | OUTPATIENT
Start: 2025-01-17 | End: 2025-01-17

## 2025-01-17 RX ORDER — SUCRALFATE ORAL 1 G/10ML
1 SUSPENSION ORAL 4 TIMES DAILY
Qty: 473 ML | Refills: 0 | Status: SHIPPED | OUTPATIENT
Start: 2025-01-17 | End: 2025-01-17

## 2025-01-17 RX ADMIN — HYDROMORPHONE HYDROCHLORIDE 0.2 MG: 0.2 INJECTION, SOLUTION INTRAMUSCULAR; INTRAVENOUS; SUBCUTANEOUS at 14:42

## 2025-01-17 RX ADMIN — PANTOPRAZOLE SODIUM 40 MG: 40 INJECTION, POWDER, FOR SOLUTION INTRAVENOUS at 13:58

## 2025-01-17 RX ADMIN — DIPHENHYDRAMINE HYDROCHLORIDE 25 MG: 50 INJECTION, SOLUTION INTRAMUSCULAR; INTRAVENOUS at 12:45

## 2025-01-17 RX ADMIN — HYDROMORPHONE HYDROCHLORIDE 0.5 MG: 1 INJECTION, SOLUTION INTRAMUSCULAR; INTRAVENOUS; SUBCUTANEOUS at 17:04

## 2025-01-17 RX ADMIN — METOCLOPRAMIDE HYDROCHLORIDE 5 MG: 5 INJECTION INTRAMUSCULAR; INTRAVENOUS at 12:48

## 2025-01-17 RX ADMIN — KETOROLAC TROMETHAMINE 15 MG: 15 INJECTION, SOLUTION INTRAMUSCULAR; INTRAVENOUS at 12:47

## 2025-01-17 RX ADMIN — DEXTROSE AND SODIUM CHLORIDE: 5; 900 INJECTION, SOLUTION INTRAVENOUS at 17:25

## 2025-01-17 RX ADMIN — OLANZAPINE 2.5 MG: 10 INJECTION, POWDER, FOR SOLUTION INTRAMUSCULAR at 14:42

## 2025-01-17 RX ADMIN — DEXTROSE AND SODIUM CHLORIDE: 5; 900 INJECTION, SOLUTION INTRAVENOUS at 15:24

## 2025-01-17 RX ADMIN — OLANZAPINE 2.5 MG: 10 INJECTION, POWDER, FOR SOLUTION INTRAMUSCULAR at 13:52

## 2025-01-17 RX ADMIN — SODIUM CHLORIDE 1000 ML: 9 INJECTION, SOLUTION INTRAVENOUS at 12:45

## 2025-01-17 ASSESSMENT — ACTIVITIES OF DAILY LIVING (ADL)
ADLS_ACUITY_SCORE: 46

## 2025-01-17 NOTE — ED TRIAGE NOTES
Triage Assessment (Adult)       Row Name 01/17/25 1212          Triage Assessment    Airway WDL WDL        Respiratory WDL    Respiratory WDL WDL        Skin Circulation/Temperature WDL    Skin Circulation/Temperature WDL WDL        Cardiac WDL    Cardiac WDL WDL        Peripheral/Neurovascular WDL    Peripheral Neurovascular WDL WDL        Cognitive/Neuro/Behavioral WDL    Cognitive/Neuro/Behavioral WDL WDL        [place] Coma Scale    Best Eye Response 4-->(E4) spontaneous     Best Motor Response 6-->(M6) obeys commands     Best Verbal Response 5-->(V5) oriented     Jo Coma Scale Score 15

## 2025-01-17 NOTE — ED PROVIDER NOTES
ED Provider Note  [facility]      History     Chief Complaint   Patient presents with    Abdominal Pain     Pt started period today and having severe cramps causing vomiting.      HPI  Hector Mazariegos is a 19 year old female who presents with pelvic pain, vaginal bleeding, nausea and vomiting.  She reports a prior history of severely painful periods which can be associated with vomiting, she also has a history of GERD, H. pylori, prior admission in September 2024 for intractable vomiting that was felt potentially due to cannabinoid induced hyperemesis and THC Gummies.  Previous workup CT abdomen pelvis with 4.4 cm ovarian cyst otherwise negative.  Her period started today with intractable nausea and vomiting not responding to ibuprofen and Zofran taken at home, causing her to double over.  Denies urinary symptoms or diarrhea.  Denies fever or chills.  States she has never been sexually active.  No prior history of abdominal surgery.  She admits to relatively regular cannabis use which she smokes to help her sleep at night.    Past Medical History  History reviewed. No pertinent past medical history.  History reviewed. No pertinent surgical history.  azelastine (ASTELIN) 0.1 % nasal spray  bismuth subsalicylate (PEPTO BISMOL) 262 MG chewable tablet  metroNIDAZOLE (FLAGYL) 250 MG tablet  tetracycline (ACHROMYCIN/SUMYCIN) 500 MG capsule      No Known Allergies  Family History  Family History   Problem Relation Age of Onset    Diabetes Mother     Diabetes Brother     Alzheimer Disease Paternal Grandmother      Social History   Social History     Tobacco Use    Smoking status: Never     Passive exposure: Never    Smokeless tobacco: Never   Vaping Use    Vaping status: Never Used   Substance Use Topics    Alcohol use: No    Drug use: No      A medically appropriate review of systems was performed with pertinent positives and negatives noted in the HPI, and all other systems  "negative.    Physical Exam   BP: (!) 132/94  Pulse: 60  Temp: 97.6  F (36.4  C)  Resp: 18  Height: 175.3 cm (5' 9\")  Weight: 115.6 kg (254 lb 14.4 oz)  SpO2: 100 %  Physical Exam  Vitals and nursing note reviewed.   Constitutional:       General: She is in acute distress (Actively vomiting).      Appearance: Normal appearance. She is not diaphoretic.   HENT:      Head: Atraumatic.      Mouth/Throat:      Mouth: Mucous membranes are moist.   Eyes:      General: No scleral icterus.     Conjunctiva/sclera: Conjunctivae normal.   Cardiovascular:      Rate and Rhythm: Normal rate.      Heart sounds: Normal heart sounds.   Pulmonary:      Effort: No respiratory distress.      Breath sounds: Normal breath sounds.   Abdominal:      General: Abdomen is flat.      Tenderness: There is abdominal tenderness in the right lower quadrant, suprapubic area and left lower quadrant. There is no guarding or rebound.   Musculoskeletal:      Cervical back: Neck supple.   Skin:     General: Skin is warm.      Findings: No rash.   Neurological:      General: No focal deficit present.      Mental Status: She is alert and oriented to person, place, and time.           ED Course, Procedures, & Data      Procedures                Results for orders placed or performed during the hospital encounter of 01/17/25   US Pelvis Cmplt w Transvag & Doppler LmtPel Duplex Limited     Status: None    Narrative    EXAM: US PELVIS COMPLETE W TRANSVAGINAL AND DOPPLER LIMITED  LOCATION: [facility]  DATE: 1/17/2025    INDICATION: Severe pelvic pain, vaginal bleeding history of left ovarian cyst, evaluate for torsion  COMPARISON: None.  TECHNIQUE: Transabdominal scans were performed. Endovaginal ultrasound was declined by the patient. Color flow with spectral Doppler and waveform analysis performed.    FINDINGS:    UTERUS: 6.5 x 4.6 x 2.8 cm. Normal in size and position with no masses.    ENDOMETRIUM: Poorly " visualized.    RIGHT OVARY: 3.2 x 2.3 x 2.1 cm. Normal with arterial and venous duplex flow identified.    LEFT OVARY: Not visualized due to empty bladder and transabdominal technique.    No significant free fluid.      Impression    IMPRESSION:    Limited unremarkable pelvic ultrasound.         Comprehensive metabolic panel     Status: Abnormal   Result Value Ref Range    Sodium 140 135 - 145 mmol/L    Potassium 4.6 3.4 - 5.3 mmol/L    Carbon Dioxide (CO2) 21 (L) 22 - 29 mmol/L    Anion Gap 16 (H) 7 - 15 mmol/L    Urea Nitrogen 12.9 6.0 - 20.0 mg/dL    Creatinine 0.64 0.51 - 0.95 mg/dL    GFR Estimate >90 >60 mL/min/1.73m2    Calcium 9.2 8.8 - 10.4 mg/dL    Chloride 103 98 - 107 mmol/L    Glucose 117 (H) 70 - 99 mg/dL    Alkaline Phosphatase 103 40 - 150 U/L    AST      ALT      Protein Total 7.7 6.4 - 8.3 g/dL    Albumin 4.4 3.5 - 5.2 g/dL    Bilirubin Total 0.3 <=1.2 mg/dL   HCG qualitative Blood     Status: Normal   Result Value Ref Range    hCG Serum Qualitative Negative Negative   CBC with platelets and differential     Status: Abnormal   Result Value Ref Range    WBC Count 7.4 4.0 - 11.0 10e3/uL    RBC Count 4.97 3.80 - 5.20 10e6/uL    Hemoglobin 10.9 (L) 11.7 - 15.7 g/dL    Hematocrit 36.0 35.0 - 47.0 %    MCV 72 (L) 78 - 100 fL    MCH 21.9 (L) 26.5 - 33.0 pg    MCHC 30.3 (L) 31.5 - 36.5 g/dL    RDW 18.6 (H) 10.0 - 15.0 %    Platelet Count 461 (H) 150 - 450 10e3/uL    % Neutrophils 70 %    % Lymphocytes 26 %    % Monocytes 3 %    % Eosinophils 0 %    % Basophils 0 %    % Immature Granulocytes 0 %    NRBCs per 100 WBC 0 <1 /100    Absolute Neutrophils 5.1 1.6 - 8.3 10e3/uL    Absolute Lymphocytes 1.9 0.8 - 5.3 10e3/uL    Absolute Monocytes 0.3 0.0 - 1.3 10e3/uL    Absolute Eosinophils 0.0 0.0 - 0.7 10e3/uL    Absolute Basophils 0.0 0.0 - 0.2 10e3/uL    Absolute Immature Granulocytes 0.0 <=0.4 10e3/uL    Absolute NRBCs 0.0 10e3/uL   CBC with platelets differential     Status: Abnormal    Narrative    The  following orders were created for panel order CBC with platelets differential.  Procedure                               Abnormality         Status                     ---------                               -----------         ------                     CBC with platelets and d...[[id number]]  Abnormal            Final result                 Please view results for these tests on the individual orders.     Medications   dextrose 5% and 0.9% NaCl infusion ( Intravenous $New Bag 1/17/25 1524)   sodium chloride 0.9% BOLUS 1,000 mL (0 mLs Intravenous Stopped 1/17/25 1531)   metoclopramide (REGLAN) injection 5 mg (5 mg Intravenous $Given 1/17/25 1248)   diphenhydrAMINE (BENADRYL) injection 25 mg (25 mg Intravenous $Given 1/17/25 1245)   ketorolac (TORADOL) injection 15 mg (15 mg Intravenous $Given 1/17/25 1247)   OLANZapine (zyPREXA) IV injection 2.5 mg (2.5 mg Intravenous $Given 1/17/25 1352)   pantoprazole (PROTONIX) IV push injection 40 mg (40 mg Intravenous $Given 1/17/25 1358)   OLANZapine (zyPREXA) IV injection 2.5 mg (2.5 mg Intravenous $Given 1/17/25 1442)   HYDROmorphone (DILAUDID) injection 0.2 mg (0.2 mg Intravenous $Given 1/17/25 1442)     Labs Ordered and Resulted from Time of ED Arrival to Time of ED Departure   COMPREHENSIVE METABOLIC PANEL - Abnormal       Result Value    Sodium 140      Potassium 4.6      Carbon Dioxide (CO2) 21 (*)     Anion Gap 16 (*)     Urea Nitrogen 12.9      Creatinine 0.64      GFR Estimate >90      Calcium 9.2      Chloride 103      Glucose 117 (*)     Alkaline Phosphatase 103      AST        ALT        Protein Total 7.7      Albumin 4.4      Bilirubin Total 0.3     CBC WITH PLATELETS AND DIFFERENTIAL - Abnormal    WBC Count 7.4      RBC Count 4.97      Hemoglobin 10.9 (*)     Hematocrit 36.0      MCV 72 (*)     MCH 21.9 (*)     MCHC 30.3 (*)     RDW 18.6 (*)     Platelet Count 461 (*)     % Neutrophils 70      % Lymphocytes 26      % Monocytes 3      % Eosinophils 0      %  Basophils 0      % Immature Granulocytes 0      NRBCs per 100 WBC 0      Absolute Neutrophils 5.1      Absolute Lymphocytes 1.9      Absolute Monocytes 0.3      Absolute Eosinophils 0.0      Absolute Basophils 0.0      Absolute Immature Granulocytes 0.0      Absolute NRBCs 0.0     HCG QUALITATIVE PREGNANCY - Normal    hCG Serum Qualitative Negative     ROUTINE UA WITH MICROSCOPIC REFLEX TO CULTURE     US Pelvis Cmplt w Transvag & Doppler LmtPel Duplex Limited   Final Result   IMPRESSION:     Limited unremarkable pelvic ultrasound.                      Critical care was not performed.     Medical Decision Making  The patient's presentation was of moderate complexity (an acute illness with systemic symptoms).    The patient's evaluation involved:  review of external note(s) from 1 sources (review of documentation from ED visit and hospitalization for gastritis and vomiting 9/15/2024)  review of 1 test result(s) ordered prior to this encounter (review of patient's previous CT abdomen pelvis 9/2024)  strong consideration of a test (CT abdomen pelvis considered but ultimately deferred based on serial exam and prior workup as well as entire clinical scenario) that was ultimately deferred  ordering and/or review of 3+ test(s) in this encounter (see separate area of note for details)  independent interpretation of testing performed by another health professional (pelvic ultrasound images personally reviewed and reviewed radiologist dictation)    The patient's management necessitated moderate risk (prescription drug management including medications given in the ED), high risk (drug therapy requiring intensive monitoring (IV Zyprexa, IV Dilaudid)), and further care after sign-out to Dr. Holloway (see their note for further management).    Assessment & Plan    19-year-old female with a history of dysmenorrhea, GERD, 1 prior admission for intractable vomiting is now presenting with the acute onset of pelvic pain associated  with vaginal bleeding and intractable nausea and vomiting.  Broad differential diagnosis was considered including ovarian torsion, ectopic pregnancy, severe dysmenorrhea, cannabinoid induced hyperemesis/cyclic vomiting, small bowel obstruction, PID, appendicitis, TOA, pyelonephritis, ureterolithiasis.  Exam she is actively vomiting.  Vital signs are normal.  She has suprapubic tenderness and tenderness in both adnexa but no peritoneal signs.  The remainder of a complete physical exam is normal.  Patient's labs she has a metabolic anion gap acidosis without significant hyperglycemia, consistent with acute severe vomiting illness.  She has acute on chronic anemia.  She is not pregnant.  Her pelvic ultrasound is limited as the patient would not consent to a transvaginal ultrasound, no signs of torsion in the right ovary, left ovary not well-visualized, no obvious uterine abnormalities.  Treated initially with Reglan and Benadryl with transient relief but then was vomiting again.  Received a second round of medications including Zyprexa and Protonix.  She is improved but states she still feels nauseated if she sits up.  At this point we will hydrate her, allow time for serial exam and another oral challenge.  If she is unable to tolerate orals may need to be admitted to observation status, otherwise would recommend discharge with symptomatic management.  Will sign out to Dr. Holloway at shift change.    I have reviewed the nursing notes. I have reviewed the findings, diagnosis, plan and need for follow up with the patient.    New Prescriptions    No medications on file       Final diagnoses:   Intractable nausea and vomiting   Dysmenorrhea       Mark Norman MD  [facility] EMERGENCY DEPARTMENT  1/17/2025     Mark Norman MD  01/17/25 [id number]

## 2025-01-18 ENCOUNTER — NURSE TRIAGE (OUTPATIENT)
Dept: NURSING | Facility: CLINIC | Age: 20
End: 2025-01-18
Payer: COMMERCIAL

## 2025-01-18 LAB
ATRIAL RATE - MUSE: 43 BPM
DIASTOLIC BLOOD PRESSURE - MUSE: NORMAL MMHG
INTERPRETATION ECG - MUSE: NORMAL
P AXIS - MUSE: -1 DEGREES
PR INTERVAL - MUSE: 150 MS
QRS DURATION - MUSE: 78 MS
QT - MUSE: 532 MS
QTC - MUSE: 449 MS
R AXIS - MUSE: 33 DEGREES
SYSTOLIC BLOOD PRESSURE - MUSE: NORMAL MMHG
T AXIS - MUSE: 9 DEGREES
VENTRICULAR RATE- MUSE: 43 BPM

## 2025-01-18 NOTE — CONFIDENTIAL NOTE
Nurse Triage SBAR    Is this a 2nd Level Triage? NO    Situation: h pylori    Pain is still there pain level 8/10 (but better than when she was seen in the ED) , vomiting 3-4, nauseas 6/10; she is drinking Gatorade and water and bland foods    Background:     Assessment: period cramping making the abdominal pain worse    Protocol Recommended Disposition:   Home care - Caller was informed of care advice (take Tylenol, use warm pack to abdomin) and to monitor for worsening signs & symptoms. Caller verbalized understanding.      Kelechi Cooper RN, BSN  Triage Nurse Advisor        Kelechi Cooper RN, BSN  Triage Nurse Advisor

## 2025-01-18 NOTE — TELEPHONE ENCOUNTER
Reason for Disposition    [1] Recent medical visit within 24 hours AND [2] condition / symptoms SAME (unchanged) AND [3] caller has additional questions triager can answer    Additional Information    Negative: Shock suspected (e.g., cold/pale/clammy skin, too weak to stand, low BP, rapid pulse)    Negative: Difficult to awaken or acting confused (e.g., disoriented, slurred speech)    Negative: Sounds like a life-threatening emergency to the triager    Negative: [1] Drinking very little AND [2] dehydration suspected (e.g., no urine > 12 hours, very dry mouth, very lightheaded)    Negative: Patient sounds very sick or weak to the triager    Negative: Fever > 104 F (40 C)    Negative: [1] SEVERE pain (e.g., excruciating, pain scale 8-10) AND [2] not improved after pain medications    Negative: [1] Recent medical visit within 24 hours AND [2] condition / symptoms WORSE    Negative: [1] Recent medical visit within 24 hours AND [2] NEW symptom AND [3] that could be serious    Negative: [1] Caller has URGENT question (includes prescribed medication questions) AND [2] triager unable to answer question    Negative: [1] Recent medical visit within 24 hours AND [2] new-onset of fever AND [3] doctor (or NP/PA) said to call if this occurred    Negative: [1] Caller has NON-URGENT question (includes prescribed medication questions) AND [2] triager unable to answer    Protocols used: Recent Medical Visit for Illness Follow-up Call-A-

## 2025-01-21 ENCOUNTER — VIRTUAL VISIT (OUTPATIENT)
Dept: PHARMACY | Facility: CLINIC | Age: 20
End: 2025-01-21
Attending: PHYSICIAN ASSISTANT
Payer: COMMERCIAL

## 2025-01-21 ENCOUNTER — HOSPITAL ENCOUNTER (EMERGENCY)
Facility: CLINIC | Age: 20
Discharge: HOME | End: 2025-01-21
Attending: EMERGENCY MEDICINE | Admitting: EMERGENCY MEDICINE
Payer: COMMERCIAL

## 2025-01-21 VITALS
DIASTOLIC BLOOD PRESSURE: 76 MMHG | BODY MASS INDEX: 37.21 KG/M2 | HEART RATE: 71 BPM | SYSTOLIC BLOOD PRESSURE: 96 MMHG | RESPIRATION RATE: 16 BRPM | OXYGEN SATURATION: 98 % | WEIGHT: 252 LBS | TEMPERATURE: 98 F

## 2025-01-21 DIAGNOSIS — A04.8 H. PYLORI INFECTION: ICD-10-CM

## 2025-01-21 DIAGNOSIS — R10.84 GENERALIZED ABDOMINAL PAIN: ICD-10-CM

## 2025-01-21 DIAGNOSIS — R10.9 ABDOMINAL PAIN: Primary | ICD-10-CM

## 2025-01-21 DIAGNOSIS — E87.6 HYPOKALEMIA: ICD-10-CM

## 2025-01-21 LAB
ALBUMIN SERPL BCG-MCNC: 4.7 G/DL (ref 3.5–5.2)
ALP SERPL-CCNC: 84 U/L (ref 40–150)
ALT SERPL W P-5'-P-CCNC: 73 U/L (ref 0–50)
ANION GAP SERPL CALCULATED.3IONS-SCNC: 13 MMOL/L (ref 7–15)
AST SERPL W P-5'-P-CCNC: 48 U/L (ref 0–45)
BASOPHILS # BLD AUTO: 0 10E3/UL (ref 0–0.2)
BASOPHILS NFR BLD AUTO: 0 %
BILIRUB SERPL-MCNC: 0.4 MG/DL
BUN SERPL-MCNC: 9.9 MG/DL (ref 6–20)
CALCIUM SERPL-MCNC: 9.1 MG/DL (ref 8.8–10.4)
CHLORIDE SERPL-SCNC: 101 MMOL/L (ref 98–107)
CREAT SERPL-MCNC: 0.75 MG/DL (ref 0.51–0.95)
EGFRCR SERPLBLD CKD-EPI 2021: >90 ML/MIN/1.73M2
EOSINOPHIL # BLD AUTO: 0 10E3/UL (ref 0–0.7)
EOSINOPHIL NFR BLD AUTO: 0 %
ERYTHROCYTE [DISTWIDTH] IN BLOOD BY AUTOMATED COUNT: 17.6 % (ref 10–15)
GLUCOSE SERPL-MCNC: 80 MG/DL (ref 70–99)
HCG SERPL QL: NEGATIVE
HCO3 SERPL-SCNC: 27 MMOL/L (ref 22–29)
HCT VFR BLD AUTO: 39.4 % (ref 35–47)
HGB BLD-MCNC: 11.8 G/DL (ref 11.7–15.7)
IMM GRANULOCYTES # BLD: 0 10E3/UL
IMM GRANULOCYTES NFR BLD: 0 %
LIPASE SERPL-CCNC: 37 U/L (ref 13–60)
LYMPHOCYTES # BLD AUTO: 2.9 10E3/UL (ref 0.8–5.3)
LYMPHOCYTES NFR BLD AUTO: 37 %
MCH RBC QN AUTO: 21.7 PG (ref 26.5–33)
MCHC RBC AUTO-ENTMCNC: 29.9 G/DL (ref 31.5–36.5)
MCV RBC AUTO: 72 FL (ref 78–100)
MONOCYTES # BLD AUTO: 0.5 10E3/UL (ref 0–1.3)
MONOCYTES NFR BLD AUTO: 6 %
NEUTROPHILS # BLD AUTO: 4.4 10E3/UL (ref 1.6–8.3)
NEUTROPHILS NFR BLD AUTO: 56 %
NRBC # BLD AUTO: 0 10E3/UL
NRBC BLD AUTO-RTO: 0 /100
PLATELET # BLD AUTO: 495 10E3/UL (ref 150–450)
POTASSIUM SERPL-SCNC: 3.2 MMOL/L (ref 3.4–5.3)
PROT SERPL-MCNC: 7.5 G/DL (ref 6.4–8.3)
RBC # BLD AUTO: 5.45 10E6/UL (ref 3.8–5.2)
SODIUM SERPL-SCNC: 141 MMOL/L (ref 135–145)
WBC # BLD AUTO: 7.8 10E3/UL (ref 4–11)

## 2025-01-21 PROCEDURE — 84703 CHORIONIC GONADOTROPIN ASSAY: CPT | Performed by: EMERGENCY MEDICINE

## 2025-01-21 PROCEDURE — 83690 ASSAY OF LIPASE: CPT | Performed by: EMERGENCY MEDICINE

## 2025-01-21 PROCEDURE — 96374 THER/PROPH/DIAG INJ IV PUSH: CPT | Mod: 59

## 2025-01-21 PROCEDURE — 250N000013 HC RX MED GY IP 250 OP 250 PS 637: Performed by: EMERGENCY MEDICINE

## 2025-01-21 PROCEDURE — 96375 TX/PRO/DX INJ NEW DRUG ADDON: CPT

## 2025-01-21 PROCEDURE — 250N000011 HC RX IP 250 OP 636: Performed by: EMERGENCY MEDICINE

## 2025-01-21 PROCEDURE — 99284 EMERGENCY DEPT VISIT MOD MDM: CPT | Mod: 25

## 2025-01-21 PROCEDURE — 85041 AUTOMATED RBC COUNT: CPT | Performed by: EMERGENCY MEDICINE

## 2025-01-21 PROCEDURE — 85004 AUTOMATED DIFF WBC COUNT: CPT | Performed by: EMERGENCY MEDICINE

## 2025-01-21 PROCEDURE — 84450 TRANSFERASE (AST) (SGOT): CPT | Performed by: EMERGENCY MEDICINE

## 2025-01-21 PROCEDURE — 84155 ASSAY OF PROTEIN SERUM: CPT | Performed by: EMERGENCY MEDICINE

## 2025-01-21 PROCEDURE — 36415 COLL VENOUS BLD VENIPUNCTURE: CPT | Performed by: EMERGENCY MEDICINE

## 2025-01-21 RX ORDER — KETOROLAC TROMETHAMINE 15 MG/ML
15 INJECTION, SOLUTION INTRAMUSCULAR; INTRAVENOUS ONCE
Status: COMPLETED | OUTPATIENT
Start: 2025-01-21 | End: 2025-01-21

## 2025-01-21 RX ORDER — HALOPERIDOL 5 MG/ML
2 INJECTION INTRAMUSCULAR ONCE
Status: COMPLETED | OUTPATIENT
Start: 2025-01-21 | End: 2025-01-21

## 2025-01-21 RX ORDER — POTASSIUM CHLORIDE 1500 MG/1
40 TABLET, EXTENDED RELEASE ORAL ONCE
Status: COMPLETED | OUTPATIENT
Start: 2025-01-21 | End: 2025-01-21

## 2025-01-21 RX ADMIN — POTASSIUM CHLORIDE 40 MEQ: 1500 TABLET, EXTENDED RELEASE ORAL at 22:33

## 2025-01-21 RX ADMIN — KETOROLAC TROMETHAMINE 15 MG: 15 INJECTION, SOLUTION INTRAMUSCULAR; INTRAVENOUS at 22:32

## 2025-01-21 RX ADMIN — HALOPERIDOL LACTATE 2 MG: 5 INJECTION, SOLUTION INTRAMUSCULAR at 21:23

## 2025-01-21 ASSESSMENT — ACTIVITIES OF DAILY LIVING (ADL)
ADLS_ACUITY_SCORE: 46

## 2025-01-21 NOTE — PROGRESS NOTES
Medication Therapy Management (MTM) Encounter    ASSESSMENT:                            Medication Adherence/Access: No issues identified.    Abdominal pain:  Given timing and symptoms, it is unlikely that symptoms are from H. Pylori treatment. However, if current symptoms are related to H. Pylori treatment, would expect symptoms to improve within 24-36 hours (3-5 half lives). Encouraged Hector to be re-evaluate in the emergency room if symptoms worsen or do not improve.    H. Pylori:  Given Hector has had severe abdominal pain since starting treatment for H. Pylori, it would be reasonable to stop treatment at this time to see if she notices improvement in symptoms. She was previously on omeprazole with no symptoms and was started on sucralfate after symptoms began so it would be reasonable to continue these medications. Hector will require further treatment for her H. Pylori but this can be determined once her current symptoms resolve.    PLAN:                            Recommend stopping medication for H. Pylori. You can continue to use omeprazole and sucralfate to help with gastritis symptoms.  If your symptoms are from the H. Pylori medication, I would expect symptoms to improve within 24-36 hours of stopping the medication.   If you continue to have severe abdominal pain or your symptoms worsen, I recommend being re-evaluated in the emergency room.    Follow-up: With MTM once symptoms have resolved    SUBJECTIVE/OBJECTIVE:                          Hector Mazariegos is a 19 year old female seen for a follow-up visit.       Reason for visit: H. Pylori follow-up, abdominal pain.    Allergies/ADRs: None  Past Medical History: Reviewed in chart  Tobacco: She reports that she has never smoked. She has never been exposed to tobacco smoke. She has never used smokeless tobacco.  Alcohol: none  THC/CBD: yes     Medication Adherence/Access: no issues reported.    Abdominal pain:  Sucralfate 1 g four times  daily    Hector is reporting severe abdominal pain, nausea, and vomiting. These symptoms started the day of her menstrual cycle. She also started treatment for H. Pylori the same day. She has been seen in the ER multiple times with no significant abnormalities on labs or imaging. There was some concern for cannabinoid induced hyperemesis. She reports she last used marijuana 2 weeks ago.    She stopped taking her medications for H. Pylori at 3 AM this morning and initially noticed improvement, but reports symptoms have returned. She initially took tylenol but is not currently taking any medications to help with pain.    H.Pylori:   Bsimuth subsalicylate 524 mg four times daily  Tetracycline 500 mg four times daily  Metronidazole 250 mg four times daily  Omeprazole 20 mg twice daily.    Hector was diagnosed with H. Pylori on 9/16/2024 via stool test. She started taking medications 1/17/2025 but noticed abdominal pain, nausea, and vomiting since starting medications. She stopped treatment for H. Pylori this morning.     Started regimen on: 1/17/2025  Taking medications as directed: yes    ----------------  Post Discharge Medication Reconciliation Status: discharge medications reconciled and changed, per note/orders.    I spent 17 minutes with this patient today. All changes were made via collaborative practice agreement with Micki Clark PA-C    A summary of these recommendations was sent via [employer].    Sarah Figueroa PharmD, BCPS  MTM Pharmacist   [facility]   Phone: [phone]    Telemedicine Visit Details  The patient's medications can be safely assessed via a telemedicine encounter.  Type of service:  Telephone visit  Originating Location (pt. Location): Home    Distant Location (provider location):  Off-site  Start Time:  3:07 PM  End Time:  3:24 PM     Medication Therapy Recommendations  No medication therapy recommendations to display

## 2025-01-21 NOTE — Clinical Note
MIGUELINA Young is stopping treatment for H. Pylori due to severe abdominal pain, nausea, and vomiting. Seen in ED x3 with no significant findings. I will follow-up for future treatment once symptoms resolve.

## 2025-01-21 NOTE — PATIENT INSTRUCTIONS
"Recommendations from today's MTM visit:                                                      Recommend stopping medication for H. Pylori. You can continue to use omeprazole and sucralfate to help with gastritis symptoms.  If your symptoms are from the H. Pylori medication, I would expect symptoms to improve within 24-36 hours of stopping the medication.   If you continue to have severe abdominal pain or your symptoms worsen, I recommend being re-evaluated in the emergency room.    Follow-up: With MTM once symptoms have resolved    It was great speaking with you today.  I value your experience and would be very thankful for your time in providing feedback in our clinic survey. In the next few days, you may receive an email or text message from [employer] with a link to a survey related to your  clinical pharmacist.\"     To schedule another MTM appointment, please call the clinic directly or you may call the MTM scheduling line at [phone].    My Clinical Pharmacist's contact information:                                                      Please feel free to contact me with any questions or concerns you have.      Sarah FriedmanD, BCPS  MTM Pharmacist   [facility]   Phone: [phone]     "

## 2025-01-21 NOTE — ED TRIAGE NOTES
Lower abdominal pain started 4 days ago. Pain has been constant since it started. Pt was diagnosed with H. Pylori in September. Pt was started on medication this week, but has not been taking it consistently due to her nausea and vomiting. Pt has been to the ED multiple times and states that the nausea meds she has been given help at the time, but wear off at home.

## 2025-01-22 ENCOUNTER — TELEPHONE (OUTPATIENT)
Dept: GASTROENTEROLOGY | Facility: CLINIC | Age: 20
End: 2025-01-22
Payer: COMMERCIAL

## 2025-01-22 DIAGNOSIS — R11.2 NAUSEA AND VOMITING, UNSPECIFIED VOMITING TYPE: ICD-10-CM

## 2025-01-22 DIAGNOSIS — R10.84 ABDOMINAL PAIN, GENERALIZED: Primary | ICD-10-CM

## 2025-01-22 RX ORDER — DICYCLOMINE HCL 20 MG
20 TABLET ORAL EVERY 6 HOURS
Qty: 120 TABLET | Refills: 0 | Status: SHIPPED | OUTPATIENT
Start: 2025-01-22 | End: 2025-02-21

## 2025-01-22 RX ORDER — ONDANSETRON 4 MG/1
4 TABLET, ORALLY DISINTEGRATING ORAL EVERY 8 HOURS PRN
Qty: 30 TABLET | Refills: 0 | Status: SHIPPED | OUTPATIENT
Start: 2025-01-22

## 2025-01-22 NOTE — ED PROVIDER NOTES
Emergency Department Note      History of Present Illness     Chief Complaint   Abdominal Pain      HPI   Hector Mazariegos is a 19 year old female presenting to the ED with abdominal pain. She states that over the past four days she has suffered ongoing abdominal pain, vomiting, and constipation. She presented to the ED for treatment of this pain yesterday though since discharge the pain has continued. She states she has decreased appetite and her emesis currently consists of mostly stomach acid. Denies any chills, fever, or black/bloody stool. She is not on any narcotic medications. She has ceased taking her H.pylori related medication per recommendation from pharmacy.    Independent Historian   None    Review of External Notes   [facility] negative. Hx of cannabis hyperemesis syndrome. Labs showed she was hypokalemic.    Past Medical History     Medical History and Problem List   Obesity  Anxiety  Acute pulmonary embolism  Acute gastritis without hemorrhage  Cannabis hyperemesis syndrome  Iron deficiency anemia  Vitamin D deficiency    Medications   Astelin  Flagyl  Prilosec   Carafate  Tetracycline  Zofran  Imodium    Surgical History   Reduction breast    Physical Exam     Patient Vitals for the past 24 hrs:   BP Temp Temp src Pulse Resp SpO2 Weight   01/21/25 1753 96/76 98  F (36.7  C) Temporal 71 16 98 % 114.3 kg (252 lb)     Physical Exam  General: alert, sitting upright on the gurney.  HENT: mucous membranes moist  CV: regular rate, regular rhythm  Resp: normal effort, clear throughout, no crackles or wheezing  GI: abdomen soft but with diffuse abdominal tenderness.  Negative Sheets sign, no pain over McBurney's point.  No rebound or guarding.    MSK: no bony tenderness  Skin: appropriately warm and dry  Extremities: no edema, calves non-tender  Neuro: alert, clear speech, oriented  Psych: normal mood and affect      Diagnostics      Lab Results   Labs Ordered and Resulted from Time of ED Arrival to Time of ED Departure   COMPREHENSIVE METABOLIC PANEL - Abnormal       Result Value    Sodium 141      Potassium 3.2 (*)     Carbon Dioxide (CO2) 27      Anion Gap 13      Urea Nitrogen 9.9      Creatinine 0.75      GFR Estimate >90      Calcium 9.1      Chloride 101      Glucose 80      Alkaline Phosphatase 84      AST 48 (*)     ALT 73 (*)     Protein Total 7.5      Albumin 4.7      Bilirubin Total 0.4     CBC WITH PLATELETS AND DIFFERENTIAL - Abnormal    WBC Count 7.8      RBC Count 5.45 (*)     Hemoglobin 11.8      Hematocrit 39.4      MCV 72 (*)     MCH 21.7 (*)     MCHC 29.9 (*)     RDW 17.6 (*)     Platelet Count 495 (*)     % Neutrophils 56      % Lymphocytes 37      % Monocytes 6      % Eosinophils 0      % Basophils 0      % Immature Granulocytes 0      NRBCs per 100 WBC 0      Absolute Neutrophils 4.4      Absolute Lymphocytes 2.9      Absolute Monocytes 0.5      Absolute Eosinophils 0.0      Absolute Basophils 0.0      Absolute Immature Granulocytes 0.0      Absolute NRBCs 0.0     LIPASE - Normal    Lipase 37     HCG QUALITATIVE PREGNANCY - Normal    hCG Serum Qualitative Negative         Imaging   No orders to display     Independent Interpretation   None    ED Course      Medications Administered   Medications   haloperidol lactate (HALDOL) injection 2 mg (has no administration in time range)       Procedures   Procedures     Discussion of Management   None    ED Course   ED Course as of 01/21/25 2212 Tue Jan 21, 2025 2042 I obtained history and examined the patient as noted above.   2210 Patient states she is feeling better, has slight left lower quadrant abdominal pain that persists.  Will try dose of Toradol.  Otherwise, we will follow-up with gastroenterology.       Additional Documentation  None    Medical Decision Making / Diagnosis     CMS Diagnoses: None    MIPS       None    MDM   Hector Mazariegos is a 19 year old female  with a history of H. pylori infection, chronic abdominal pain, presenting with exacerbation of abdominal pain.  On exam, the patient has normal vitals.  She has diffuse abdominal tenderness, without findings to suggest cholecystitis, appendicitis, or peritonitis.  I reviewed results from CT scan which was obtained yesterday for the same symptoms, which was negative.  Labs today are reassuring, no evidence of pancreatitis.  Patient was noted to have very slight elevation in AST and ALT, but normal bilirubin.  She had a similar pattern noted on LFTs checked in September 2024.  No gallstones on the CT scan which was obtained yesterday.   we discussed that I do not have a clear etiology for the patient's symptoms, and pain medication for abdominal pain are somewhat limited.  We discussed reasons for not wanting to prescribe narcotics in the setting.  I recommended close follow-up with gastroenterology, given that we have not found an dangerous cause from the ER, but her diagnostic testing is limited.  Return for worsening pain, new symptoms such as fever, black or bloody stools, vomiting blood, or for any other concerns.    Disposition and continued to show  The patient was discharged.     Diagnosis     ICD-10-CM    1. Generalized abdominal pain  R10.84       2. Hypokalemia  E87.6            Discharge Medications   New Prescriptions    No medications on file     Scribe Disclosure:  I, KHRIS CORTES, am serving as a scribe at 8:48 PM on 1/21/2025 to document services personally performed by Lien Coker MD based on my observations and the provider's statements to me.      Lien Coker MD  01/21/25 5527

## 2025-01-22 NOTE — TELEPHONE ENCOUNTER
Call from patient to report that she had a black bowel movement today. She was constipated to having a bowel movement was good but she is worried about the color. She is also taking pepto bismol which can turn stools black. Writer update provider who reports that this more than likely due to the use of pepto bismol and pt should continue to monitor.    Patient will call back with any other questions or concerns.    Nilsa Dowell RN

## 2025-01-22 NOTE — DISCHARGE INSTRUCTIONS
We discussed that I did not find any dangerous causes to explain your symptoms today.  Your labs do show a slightly low potassium level, and I have attached some instructions with foods to eat that will help increase potassium.  We also have very slight elevation in your liver enzymes.  I recommend having this rechecked with your PCP or gastroenterologist within the next week.  I recommend follow-up with your gastroenterologist, given that there are not additional tests that I would recommend for your abdominal pain today.    Discharge Instructions  Abdominal Pain    Abdominal pain (belly pain) can be caused by many things. Your evaluation today does not show the exact cause for your pain. Your provider today has decided that it is unlikely your pain is due to a life threatening problem, or a problem requiring surgery or hospital admission. Sometimes those problems cannot be found right away, so it is very important that you follow up as directed.  Sometimes only the changes which occur over time allow the cause of your pain to be found.    Generally, every Emergency Department visit should have a follow-up clinic visit with either a primary or a specialty clinic/provider. Please follow-up as instructed by your emergency provider today. With abdominal pain, we often recommend very close follow-up, such as the following day.    ADULTS:  Return to the Emergency Department right away if:    You get an oral temperature above 102oF or as directed by your provider.  You have blood in your stools. This may be bright red or appear as black, tarry stools.    You keep vomiting (throwing up) or cannot drink liquids.  You see blood when you vomit.   You cannot have a bowel movement or you cannot pass gas.  Your stomach gets bloated or bigger.  Your skin or the whites of your eyes look yellow.  You faint.  You have bloody, frequent or painful urination (peeing).  You have new symptoms or anything that worries you.    CHILDREN:   Return to the Emergency Department right away if your child has any of the above-listed symptoms or the following:    Pushes your hand away or screams/cries when his/her belly is touched.  You notice your child is very fussy or weak.  Your child is very tired and is too tired to eat or drink.  Your child is dehydrated.  Signs of dehydration can be:  Significant change in the amount of wet diapers/urine.  Your infant or child starts to have dry mouth and lips, or no saliva (spit) or tears.    PREGNANT WOMEN:  Return to the Emergency Department right away if you have any of the above-listed symptoms or the following:    You have bleeding, leaking fluid or passing tissue from the vagina.  You have worse pain or cramping, or pain in your shoulder or back.  You have vomiting that will not stop.  You have a temperature of 100oF or more.  Your baby is not moving as much as usual.  You faint.  You get a bad headache with or without eye problems and abdominal pain.  You have a seizure.  You have unusual discharge from your vagina and abdominal pain.    Abdominal pain is pretty common during pregnancy.  Your pain may or may not be related to your pregnancy. You should follow-up closely with your OB provider so they can evaluate you and your baby.  Until you follow-up with your regular provider, do the following:     Avoid sex and do not put anything in your vagina.  Drink clear fluids.  Only take medications approved by your provider.    MORE INFORMATION:    Appendicitis:  A possible cause of abdominal pain in any person who still has their appendix is acute appendicitis. Appendicitis is often hard to diagnose.  Testing does not always rule out early appendicitis or other causes of abdominal pain. Close follow-up with your provider and re-evaluations may be needed to figure out the reason for your abdominal pain.    Follow-up:  It is very important that you make an appointment with your clinic and go to the appointment.  If  "you do not follow-up with your primary provider, it may result in missing an important development which could result in permanent injury or disability and/or lasting pain.  If there is any problem keeping your appointment, call your provider or return to the Emergency Department.    Medications:  Take your medications as directed by your provider today.  Before using over-the-counter medications, ask your provider and make sure to take the medications as directed.  If you have any questions about medications, ask your provider.    Diet:  Resume your normal diet as much as possible, but do not eat fried, fatty or spicy foods while you have pain.  Do not drink alcohol or have caffeine.  Do not smoke tobacco.    Probiotics: If you have been given an antibiotic, you may want to also take a probiotic pill or eat yogurt with live cultures. Probiotics have \"good bacteria\" to help your intestines stay healthy. Studies have shown that probiotics help prevent diarrhea (loose stools) and other intestine problems (including C. diff infection) when you take antibiotics. You can buy these without a prescription in the pharmacy section of the store.     If you were given a prescription for medicine here today, be sure to read all of the information (including the package insert) that comes with your prescription.  This will include important information about the medicine, its side effects, and any warnings that you need to know about.  The pharmacist who fills the prescription can provide more information and answer questions you may have about the medicine.  If you have questions or concerns that the pharmacist cannot address, please call or return to the Emergency Department.       Remember that you can always come back to the Emergency Department if you are not able to see your regular provider in the amount of time listed above, if you get any new symptoms, or if there is anything that worries you.    "

## 2025-01-22 NOTE — TELEPHONE ENCOUNTER
Micki Clark PA-C Heckt, Angie, RN; P [facility]  I called this patient back - went to voicemail - she has had CT abd/pelvis that has been normal (x 2). Next step would be an EGD. She has stopped her H. Pylori treatment. She can take zofran as needed and bentyl for abdominal pain. Would recommend that she does not use any marijuana.        Call to patient to update on the above from provider. She is scheduled for a EGD in February. Writer also educated on the use of the bentyl (which she has not tried yet) and zofran. She states that she will try this and call back if it is in effective.    Nilsa Dowell RN

## 2025-01-22 NOTE — TELEPHONE ENCOUNTER
"Endoscopy Scheduling Screen    Have you had any respiratory illness or flu-like symptoms in the last 10 days?  No    What is your communication preference for Instructions and/or Bowel Prep?   MyChart    What insurance is in the chart?  Other:  [facility]    Ordering/Referring Provider: Micki Clark PA-C in  GI     (If ordering provider performs procedure, schedule with ordering provider unless otherwise instructed. )    BMI: Estimated body mass index is 37.21 kg/m  as calculated from the following:    Height as of 1/17/25: 1.753 m (5' 9\").    Weight as of 1/21/25: 114.3 kg (252 lb).     Sedation Ordered  MAC/deep sedation.   BMI<= 45 45 < BMI <= 48 48 < BMI < = 50  BMI > 50   No Restrictions No  [facility]  No [facility]  [facility] with exceptions Hospital Only OR Only       Do you have a history of malignant hyperthermia?  No    (Females) Are you currently pregnant?   No     Have you been diagnosed or told you have pulmonary hypertension?   No    Do you have an LVAD?  No    Have you been told you have moderate to severe sleep apnea?  No.    Have you been told you have COPD, asthma, or any other lung disease?  No    Do you have any heart conditions?  No     Have you ever had or are you waiting for an organ transplant?  No. Continue scheduling, no site restrictions.    Have you had a stroke or transient ischemic attack (TIA aka \"mini stroke\" in the last 6 months?   No    Have you been diagnosed with or been told you have cirrhosis of the liver?   No.    Are you currently on dialysis?   No    Do you need assistance transferring?   No    BMI: Estimated body mass index is 37.21 kg/m  as calculated from the following:    Height as of 1/17/25: 1.753 m (5' 9\").    Weight as of 1/21/25: 114.3 kg (252 lb).     Is patients BMI > 40 and scheduling location [facility]?  No    Do you take an injectable or oral medication for weight loss or diabetes (excluding insulin)?  No    Do you take the medication Naltrexone?  No    Do you take blood " thinners?  No       Prep   Are you currently on dialysis or do you have chronic kidney disease?  No    Do you have a diagnosis of diabetes?  No    Do you have a diagnosis of cystic fibrosis (CF)?  No    On a regular basis do you go 3 -5 days between bowel movements?  Yes (Extended Prep)    BMI > 40?  No    Preferred Pharmacy:      [id number] - [place], [place] - [address]  [address] [address]  Phone: [phone] Fax: [phone]    Final Scheduling Details     Procedure scheduled  Upper endoscopy (EGD)    Surgeon:  SHORTY     Date of procedure:  2/26     Pre-OP / PAC:   No - Not required for this site.    Location  [facility] - Patient preference.    Sedation   MAC/Deep Sedation - Per order.      Patient Reminders:   You will receive a call from a Nurse to review instructions and health history.  This assessment must be completed prior to your procedure.  Failure to complete the Nurse assessment may result in the procedure being cancelled.      On the day of your procedure, please designate an adult(s) who can drive you home stay with you for the next 24 hours. The medicines used in the exam will make you sleepy. You will not be able to drive.      You cannot take public transportation, ride share services, or non-medical taxi service without a responsible caregiver.  Medical transport services are allowed with the requirement that a responsible caregiver will receive you at your destination.  We require that drivers and caregivers are confirmed prior to your procedure.

## 2025-01-24 ENCOUNTER — MYC MEDICAL ADVICE (OUTPATIENT)
Dept: GASTROENTEROLOGY | Facility: CLINIC | Age: 20
End: 2025-01-24
Payer: COMMERCIAL

## 2025-01-27 NOTE — TELEPHONE ENCOUNTER
Micki Clark PA-C Heckt, Angie, RN  Phone Number: [phone]     She can trial compazine instead of zofran. I would recommend she compete the H pylori treatment as she has not been taking this now for a few days and is still quite symptomatic.      Yadio message sent to patient with the above from provider.    Nilsa Dowell RN

## 2025-02-11 ENCOUNTER — TELEPHONE (OUTPATIENT)
Dept: GASTROENTEROLOGY | Facility: CLINIC | Age: 20
End: 2025-02-11
Payer: COMMERCIAL

## 2025-02-11 NOTE — TELEPHONE ENCOUNTER
The patient recently restarted taking medications for h-pylori. States she was prescribed them a few months ago but had to stop them due to issues with nausea and vomiting. Was seen in the ER multiple times over the past several months due to abdominal pain, nausea, and vomiting. The patient did recently resumed the medications when she started feeling better. She reports that she will complete these medications on 2/24/25. This writer will send a message to Micki Clark PA-C, to see if the patient needs to delay having the upper endoscopy.    Florida Mcnamara LPN  Endoscopy Procedure Pre Assessment   [phone] option 2

## 2025-02-11 NOTE — TELEPHONE ENCOUNTER
Pre assessment completed for upcoming procedure.      Procedure details:    Patient scheduled for Upper endoscopy (EGD) on 2/26/25.     Arrival time: 1215. Procedure time 1315     Facility location: [facility]; [address] [place] [address] , [place] [address]. Check in location: Main entrance at Surgery registation desk.     Sedation type: MAC     Pre op exam needed? No.     Indication for procedure:   Nausea and vomiting, unspecified vomiting type [R11.2]  - Primary      Bacterial infection due to H. pylori [A04.8]          Designated  policy reviewed. Instructed to have someone stay 24 hours post procedure.       Chart review:     Electronic implanted devices? No    Recent diagnosis of diverticulitis within the last 6 weeks?  No      Medication review:    Diabetic? No    Anticoagulants? No    Weight loss medication/injectable? No.    Other medication HOLDING recommendations:  EGD: Sucralfate (Carafate): HOLD 1 day before procedure.        Prep for procedure:     Reviewed procedure prep instructions.     Patient verbalized understanding and had no questions or concerns at this time.        Florida Mcnamara LPN  Endoscopy Procedure Pre Assessment   [phone] option 2

## 2025-02-12 ENCOUNTER — OFFICE VISIT (OUTPATIENT)
Dept: MIDWIFE SERVICES | Facility: CLINIC | Age: 20
End: 2025-02-12
Payer: COMMERCIAL

## 2025-02-12 VITALS
WEIGHT: 241 LBS | BODY MASS INDEX: 35.59 KG/M2 | DIASTOLIC BLOOD PRESSURE: 70 MMHG | HEART RATE: 105 BPM | TEMPERATURE: 97.6 F | SYSTOLIC BLOOD PRESSURE: 130 MMHG

## 2025-02-12 DIAGNOSIS — N89.8 VAGINAL DISCHARGE: Primary | ICD-10-CM

## 2025-02-12 DIAGNOSIS — F33.1 MODERATE EPISODE OF RECURRENT MAJOR DEPRESSIVE DISORDER (H): ICD-10-CM

## 2025-02-12 DIAGNOSIS — B37.31 YEAST INFECTION OF THE VAGINA: ICD-10-CM

## 2025-02-12 LAB
CLUE CELLS: ABNORMAL
TRICHOMONAS, WET PREP: ABNORMAL
WBC'S/HIGH POWER FIELD, WET PREP: ABNORMAL
YEAST, WET PREP: PRESENT

## 2025-02-12 PROCEDURE — 99204 OFFICE O/P NEW MOD 45 MIN: CPT | Performed by: ADVANCED PRACTICE MIDWIFE

## 2025-02-12 PROCEDURE — 87210 SMEAR WET MOUNT SALINE/INK: CPT | Performed by: ADVANCED PRACTICE MIDWIFE

## 2025-02-12 RX ORDER — FLUCONAZOLE 150 MG/1
150 TABLET ORAL
Qty: 2 TABLET | Refills: 0 | Status: SHIPPED | OUTPATIENT
Start: 2025-02-12

## 2025-02-12 ASSESSMENT — PATIENT HEALTH QUESTIONNAIRE - PHQ9
SUM OF ALL RESPONSES TO PHQ QUESTIONS 1-9: 11
5. POOR APPETITE OR OVEREATING: NOT AT ALL

## 2025-02-12 ASSESSMENT — ANXIETY QUESTIONNAIRES
IF YOU CHECKED OFF ANY PROBLEMS ON THIS QUESTIONNAIRE, HOW DIFFICULT HAVE THESE PROBLEMS MADE IT FOR YOU TO DO YOUR WORK, TAKE CARE OF THINGS AT HOME, OR GET ALONG WITH OTHER PEOPLE: SOMEWHAT DIFFICULT
3. WORRYING TOO MUCH ABOUT DIFFERENT THINGS: MORE THAN HALF THE DAYS
7. FEELING AFRAID AS IF SOMETHING AWFUL MIGHT HAPPEN: NEARLY EVERY DAY
2. NOT BEING ABLE TO STOP OR CONTROL WORRYING: SEVERAL DAYS
GAD7 TOTAL SCORE: 12
1. FEELING NERVOUS, ANXIOUS, OR ON EDGE: NEARLY EVERY DAY
6. BECOMING EASILY ANNOYED OR IRRITABLE: MORE THAN HALF THE DAYS
5. BEING SO RESTLESS THAT IT IS HARD TO SIT STILL: SEVERAL DAYS
GAD7 TOTAL SCORE: 12

## 2025-02-12 NOTE — TELEPHONE ENCOUNTER
"Received a message back from Micki Clark PA-C regarding the patient being on H-pylori medications so close to her EGD.    \"Micki Clark PA-C Johnson, Krista, LPN  Hello - she can still have the upper endoscopy, that's fine.    Micki\"    Will confirm this with the patient when she calls back.    Florida Mcnamara LPN  Endoscopy Procedure Pre Assessment   [phone] option 2    "

## 2025-02-12 NOTE — PROGRESS NOTES
S: Patient presents to clinic with concerns of change in vaginal discharge. Noticed white, slightly chunky discharge last night and then also noted some mild vulvar irritation today while at the gym. Denies any odor, no vaginal itching. Is about half-way through a two week antibiotic course for H.Pylori. Has never had sexual intercourse, no concern for exposure to STD.     Additionally, patient states she has very painful periods for the first 1-2 days. She often ends up in the ED for zofran and pain medication. States she has tried scheduling Ibuprofen, starting 1-2 days before her period starts and it hasn't seemed to help. In the past, has not wanted to start hormonal medication but is considering it now. Wondering what options are available to treat this.     Finally, on review of PHQ9 and GAD7 patient expresses interest in getting tested. She has been diganosed with anxiety and depression in the past. Patient states she uses the internet to try and self diagnose. She wonders if she might have Bipolar or ADHD. Reports short attention span, difficulty focusing in college classes. This is a significant change from how she was in high school.     O:   /70   Pulse 105   Temp 97.6  F (36.4  C)   Wt 109.3 kg (241 lb)   LMP 01/17/2025 (Exact Date)   BMI 35.59 kg/m      General: well appearing, in NAD  Cardiac: well perfused  Respiratory: non-labored breathing on room air   Psych: alert and oriented     PHQ9 - 11  GAD7 - 15  Wet prep: +yeast, -clue, -trich    A/P:   (N89.8) Vaginal discharge  (primary encounter diagnosis)  Comment:   Plan: Wet prep - lab collect            (F33.1) Moderate episode of recurrent major depressive disorder (H)  Comment:   Plan: Adult Mental Health  Referral, Adult         Mental Health  Referral            (B37.31) Yeast infection of the vagina  Comment:   Plan: fluconazole (DIFLUCAN) 150 MG tablet            Briefly discussed options for addressing painful  periods. Explained that if scheduled Ibuprofen not working would recommend trying a hormonal method. Can start with OCP or could also consider IUD. Would also consider pelvic ultrasound as part of workup. Patient states she wants to wait until she has finished H.Pylori treatment and upper endoscopy scheduled for the end of the month.   Plan to RTC in 1-2 months for pap smear and to revisit discussion about methods to manage painful periods.   Estelita Bustos CNM

## 2025-02-13 NOTE — TELEPHONE ENCOUNTER
Patient informed of message from Micki Clark PA-C. She verbalizes understanding.    Florida Mcnamara LPN  Endoscopy Procedure Pre Assessment   [phone] option 2

## 2025-02-25 ENCOUNTER — ANESTHESIA EVENT (OUTPATIENT)
Dept: GASTROENTEROLOGY | Facility: CLINIC | Age: 20
End: 2025-02-25
Payer: COMMERCIAL

## 2025-02-25 NOTE — H&P
[facility] Anesthesia Pre-op History and Physical    Hector Mazariegos MRN# [id number]   Age: 19 year old YOB: 2005      Date of Surgery: 2/26/2025 Location [facility]      Date of Exam 2/26/2025 Facility (In hospital)       Home clinic: [facility]  Primary care provider: No Ref-Primary, Physician         Chief Complaint and/or Reason for Procedure:   No chief complaint on file.    EGD. Hx Hp positive Ag, treated (12/2/24). Pepto, tetra, flagyl. Hx CHS.       Active problem list:     Patient Active Problem List    Diagnosis Date Noted    Moderate episode of recurrent major depressive disorder (H) 02/12/2025     Priority: Medium    Acute gastritis without hemorrhage, unspecified gastritis type 09/15/2024     Priority: Medium    Acute pulmonary embolism without acute cor pulmonale, unspecified pulmonary embolism type (H) 02/15/2023     Priority: Medium    Other acne 01/03/2022     Priority: Medium     Follows up with dermatologist       Anxiety 01/03/2022     Priority: Medium    INTOEING 01/09/2007     Priority: Medium    Obesity 01/09/2007     Priority: Medium     Problem list name updated by automated process. Provider to review              Medications (include herbals and vitamins):   Any Plavix use in the last 7 days? No     No current facility-administered medications for this encounter.     Current Outpatient Medications   Medication Sig Dispense Refill    bismuth subsalicylate (PEPTO BISMOL) 262 MG chewable tablet Take 2 tablets (524 mg) by mouth 4 times daily (before meals and nightly). 112 tablet 0    fluconazole (DIFLUCAN) 150 MG tablet Take 1 tablet (150 mg) by mouth every 3 days. 2 tablet 0    metroNIDAZOLE (FLAGYL) 250 MG tablet Take 1 tablet (250 mg) by mouth 4 times daily. 56 tablet 0    omeprazole (PRILOSEC) 20 MG DR capsule Take 1 capsule (20 mg) by mouth 2 times daily. 28 capsule 0    ondansetron (ZOFRAN ODT) 4 MG ODT tab Take  1 tablet (4 mg) by mouth every 8 hours as needed for nausea. 30 tablet 0    sucralfate (CARAFATE) 1 GM/10ML suspension Take 10 mLs (1 g) by mouth 4 times daily. 473 mL 0    tetracycline (ACHROMYCIN/SUMYCIN) 500 MG capsule Take 1 capsule (500 mg) by mouth 4 times daily. 56 capsule 0             Allergies:    No Known Allergies  Allergy to Latex? No  Allergy to tape?   No  Intolerances:             Physical Exam:   All vitals have been reviewed  No data found.  No intake/output data recorded.  Lungs:   No increased work of breathing, good air exchange, clear to auscultation bilaterally, no crackles or wheezing     Cardiovascular:   Normal apical impulse, regular rate and rhythm, normal S1 and S2, no S3 or S4, and no murmur noted             Lab / Radiology Results:            Anesthetic risk and/or ASA classification:       Warren Kevin MD

## 2025-02-25 NOTE — ANESTHESIA PREPROCEDURE EVALUATION
Anesthesia Pre-Procedure Evaluation    Patient: Hector Mazariegos   MRN: [id number] : 2005        Procedure : Procedure(s):  Esophagoscopy, gastroscopy, duodenoscopy (EGD), combined          History reviewed. No pertinent past medical history.   History reviewed. No pertinent surgical history.   No Known Allergies   Social History     Tobacco Use    Smoking status: Never     Passive exposure: Never    Smokeless tobacco: Never   Substance Use Topics    Alcohol use: No      Wt Readings from Last 1 Encounters:   25 109.3 kg (241 lb)        Anesthesia Evaluation            ROS/MED HX  ENT/Pulmonary:       Neurologic:  - neg neurologic ROS     Cardiovascular:  - neg cardiovascular ROS   (+)  - -   -  - -                                 Previous cardiac testing   Echo: Date: Results:    Stress Test:  Date: Results:    ECG Reviewed:  Date: 25 Results:  Sinus bradycardia   Abnormal ECG     Cath:  Date: Results:      METS/Exercise Tolerance:     Hematologic: Comments: Acute pulmonary embolism hx    (+) History of blood clots,               Musculoskeletal:  - neg musculoskeletal ROS     GI/Hepatic: Comment:  Nausea and vomiting, unspecified vomiting type    Bacterial infection due to H. pylori         Renal/Genitourinary:  - neg Renal ROS     Endo: Comment: BMI: 37.64    (+)               Obesity,       Psychiatric/Substance Use:     (+) psychiatric history anxiety and depression   Recreational drug usage: Cannabis.    Infectious Disease:  - neg infectious disease ROS     Malignancy:  - neg malignancy ROS     Other: Comment: LMP  2025       - neg other ROS          Physical Exam    Airway  airway exam normal      Mallampati: II   TM distance: > 3 FB   Neck ROM: full   Mouth opening: > 3 cm    Respiratory Devices and Support         Dental  no notable dental history         Cardiovascular   cardiovascular exam normal       Rhythm and rate: regular and normal     Pulmonary   pulmonary exam normal         "breath sounds clear to auscultation           OUTSIDE LABS:  CBC:   Lab Results   Component Value Date    WBC 7.8 01/21/2025    WBC 7.4 01/17/2025    HGB 11.8 01/21/2025    HGB 10.9 (L) 01/17/2025    HCT 39.4 01/21/2025    HCT 36.0 01/17/2025     (H) 01/21/2025     (H) 01/17/2025     BMP:   Lab Results   Component Value Date     01/21/2025     01/17/2025    POTASSIUM 3.2 (L) 01/21/2025    POTASSIUM 4.6 01/17/2025    CHLORIDE 101 01/21/2025    CHLORIDE 103 01/17/2025    CO2 27 01/21/2025    CO2 21 (L) 01/17/2025    BUN 9.9 01/21/2025    BUN 12.9 01/17/2025    CR 0.75 01/21/2025    CR 0.64 01/17/2025    GLC 80 01/21/2025     (H) 01/17/2025     COAGS: No results found for: \"PTT\", \"INR\", \"FIBR\"  POC:   Lab Results   Component Value Date    HCG Negative 09/11/2024    HCGS Negative 01/21/2025     HEPATIC:   Lab Results   Component Value Date    ALBUMIN 4.7 01/21/2025    PROTTOTAL 7.5 01/21/2025    ALT 73 (H) 01/21/2025    AST 48 (H) 01/21/2025    ALKPHOS 84 01/21/2025    BILITOTAL 0.4 01/21/2025     OTHER:   Lab Results   Component Value Date    LACT 1.0 05/16/2023    DANIELLA 9.1 01/21/2025    MAG 1.9 09/11/2024    LIPASE 37 01/21/2025    TSH 0.89 05/16/2023       Anesthesia Plan    ASA Status:  2    NPO Status:  NPO Appropriate    Anesthesia Type: MAC.     - Reason for MAC: straight local not clinically adequate   Induction: Intravenous, Propofol.   Maintenance: TIVA.        Consents    Anesthesia Plan(s) and associated risks, benefits, and realistic alternatives discussed. Questions answered and patient/representative(s) expressed understanding.     - Discussed:     - Discussed with:  Patient      - Extended Intubation/Ventilatory Support Discussed: No.      - Patient is DNR/DNI Status: No     Use of blood products discussed: No .     Postoperative Care    Pain management: IV analgesics.        Comments:    Other Comments: The risks and benefits of anesthesia, and the alternatives where " applicable, have been discussed with the patient, and they wish to proceed.           UZIEL Wren CRNA    I have reviewed the pertinent notes and labs in the chart from the past 30 days and (re)examined the patient.  Any updates or changes from those notes are reflected in this note.    Clinically Significant Risk Factors Present on Admission

## 2025-02-26 ENCOUNTER — HOSPITAL ENCOUNTER (OUTPATIENT)
Facility: CLINIC | Age: 20
Discharge: HOME | End: 2025-02-26
Attending: INTERNAL MEDICINE | Admitting: INTERNAL MEDICINE
Payer: COMMERCIAL

## 2025-02-26 ENCOUNTER — ANESTHESIA (OUTPATIENT)
Dept: GASTROENTEROLOGY | Facility: CLINIC | Age: 20
End: 2025-02-26
Payer: COMMERCIAL

## 2025-02-26 VITALS
TEMPERATURE: 98.1 F | HEART RATE: 91 BPM | DIASTOLIC BLOOD PRESSURE: 82 MMHG | OXYGEN SATURATION: 99 % | RESPIRATION RATE: 16 BRPM | SYSTOLIC BLOOD PRESSURE: 114 MMHG

## 2025-02-26 LAB — UPPER GI ENDOSCOPY: NORMAL

## 2025-02-26 PROCEDURE — 250N000009 HC RX 250: Performed by: NURSE ANESTHETIST, CERTIFIED REGISTERED

## 2025-02-26 PROCEDURE — 370N000017 HC ANESTHESIA TECHNICAL FEE, PER MIN: Performed by: INTERNAL MEDICINE

## 2025-02-26 PROCEDURE — 88342 IMHCHEM/IMCYTCHM 1ST ANTB: CPT | Mod: 26

## 2025-02-26 PROCEDURE — 88305 TISSUE EXAM BY PATHOLOGIST: CPT | Mod: 26

## 2025-02-26 PROCEDURE — 43239 EGD BIOPSY SINGLE/MULTIPLE: CPT | Performed by: INTERNAL MEDICINE

## 2025-02-26 PROCEDURE — 258N000003 HC RX IP 258 OP 636: Performed by: NURSE ANESTHETIST, CERTIFIED REGISTERED

## 2025-02-26 PROCEDURE — 88342 IMHCHEM/IMCYTCHM 1ST ANTB: CPT | Mod: TC | Performed by: INTERNAL MEDICINE

## 2025-02-26 PROCEDURE — 250N000011 HC RX IP 250 OP 636: Performed by: NURSE ANESTHETIST, CERTIFIED REGISTERED

## 2025-02-26 RX ORDER — LIDOCAINE HYDROCHLORIDE 20 MG/ML
INJECTION, SOLUTION INFILTRATION; PERINEURAL PRN
Status: DISCONTINUED | OUTPATIENT
Start: 2025-02-26 | End: 2025-02-26

## 2025-02-26 RX ORDER — DEXAMETHASONE SODIUM PHOSPHATE 10 MG/ML
4 INJECTION, SOLUTION INTRAMUSCULAR; INTRAVENOUS
Status: CANCELLED | OUTPATIENT
Start: 2025-02-26

## 2025-02-26 RX ORDER — LIDOCAINE 40 MG/G
CREAM TOPICAL
Status: DISCONTINUED | OUTPATIENT
Start: 2025-02-26 | End: 2025-02-26 | Stop reason: HOSPADM

## 2025-02-26 RX ORDER — PROPOFOL 10 MG/ML
INJECTION, EMULSION INTRAVENOUS CONTINUOUS PRN
Status: DISCONTINUED | OUTPATIENT
Start: 2025-02-26 | End: 2025-02-26

## 2025-02-26 RX ORDER — SODIUM CHLORIDE, SODIUM LACTATE, POTASSIUM CHLORIDE, CALCIUM CHLORIDE 600; 310; 30; 20 MG/100ML; MG/100ML; MG/100ML; MG/100ML
INJECTION, SOLUTION INTRAVENOUS CONTINUOUS
Status: DISCONTINUED | OUTPATIENT
Start: 2025-02-26 | End: 2025-02-26 | Stop reason: HOSPADM

## 2025-02-26 RX ORDER — PROPOFOL 10 MG/ML
INJECTION, EMULSION INTRAVENOUS PRN
Status: DISCONTINUED | OUTPATIENT
Start: 2025-02-26 | End: 2025-02-26

## 2025-02-26 RX ORDER — NALOXONE HYDROCHLORIDE 0.4 MG/ML
0.1 INJECTION, SOLUTION INTRAMUSCULAR; INTRAVENOUS; SUBCUTANEOUS
Status: CANCELLED | OUTPATIENT
Start: 2025-02-26

## 2025-02-26 RX ORDER — ONDANSETRON 2 MG/ML
4 INJECTION INTRAMUSCULAR; INTRAVENOUS EVERY 30 MIN PRN
Status: CANCELLED | OUTPATIENT
Start: 2025-02-26

## 2025-02-26 RX ORDER — FENTANYL CITRATE 50 UG/ML
25 INJECTION, SOLUTION INTRAMUSCULAR; INTRAVENOUS
Status: CANCELLED | OUTPATIENT
Start: 2025-02-26

## 2025-02-26 RX ORDER — ONDANSETRON 4 MG/1
4 TABLET, ORALLY DISINTEGRATING ORAL EVERY 30 MIN PRN
Status: CANCELLED | OUTPATIENT
Start: 2025-02-26

## 2025-02-26 RX ADMIN — LIDOCAINE HYDROCHLORIDE 50 MG: 20 INJECTION, SOLUTION INFILTRATION; PERINEURAL at 12:59

## 2025-02-26 RX ADMIN — PROPOFOL 300 MCG/KG/MIN: 10 INJECTION, EMULSION INTRAVENOUS at 12:59

## 2025-02-26 RX ADMIN — SODIUM CHLORIDE, SODIUM LACTATE, POTASSIUM CHLORIDE, AND CALCIUM CHLORIDE: .6; .31; .03; .02 INJECTION, SOLUTION INTRAVENOUS at 12:49

## 2025-02-26 RX ADMIN — PROPOFOL 50 MG: 10 INJECTION, EMULSION INTRAVENOUS at 13:03

## 2025-02-26 RX ADMIN — PROPOFOL 200 MG: 10 INJECTION, EMULSION INTRAVENOUS at 12:59

## 2025-02-26 ASSESSMENT — ACTIVITIES OF DAILY LIVING (ADL)
ADLS_ACUITY_SCORE: 46
ADLS_ACUITY_SCORE: 46

## 2025-02-26 NOTE — ANESTHESIA CARE TRANSFER NOTE
Patient: Hector Mazariegos    Procedure: Procedure(s):  ESOPHAGOGASTRODUODENOSCOPY, WITH BIOPSY       Diagnosis: Nausea and vomiting, unspecified vomiting type [R11.2]  Bacterial infection due to H. pylori [A04.8]  Diagnosis Additional Information: No value filed.    Anesthesia Type:   MAC     Note:    Oropharynx: oropharynx clear of all foreign objects  Level of Consciousness: drowsy  Oxygen Supplementation: nasal cannula  Level of Supplemental Oxygen (L/min / FiO2): 4  Independent Airway: airway patency satisfactory and stable  Dentition: dentition unchanged  Vital Signs Stable: post-procedure vital signs reviewed and stable  Report to RN Given: handoff report given  Patient transferred to: Phase II    Handoff Report: Identifed the Patient, Identified the Reponsible Provider, Reviewed the pertinent medical history, Discussed the surgical course, Reviewed Intra-OP anesthesia mangement and issues during anesthesia, Set expectations for post-procedure period and Allowed opportunity for questions and acknowledgement of understanding      Vitals:  Vitals Value Taken Time   /73 02/26/25 1340   Temp     Pulse 92 02/26/25 1340   Resp 16 02/26/25 1340   SpO2 99 % 02/26/25 1344   Vitals shown include unfiled device data.    Electronically Signed By: UZIEL Wren CRNA  February 26, 2025  1:46 PM

## 2025-02-26 NOTE — DISCHARGE INSTRUCTIONS
[facility]    Home Care Following Endoscopy          Activity:  You have just undergone an endoscopic procedure usually performed with conscious sedation.  Do not work or operate machinery (including a car) for at least 12 hours.    I encourage you to walk and attempt to pass this air as soon as possible.    Diet:  Return to the diet you were on before your procedure but eat lightly for the first 12-24 hours.  Drink plenty of water.  Resume any regular medications unless otherwise advised by your physician.  Please begin any new medication prescribed as a result of your procedure as directed by your physician.   If you had any biopsy or polyp removed please refrain from aspirin or aspirin products for 2 days.  If on Coumadin please restart as instructed by your physician.   Pain:  You may take Tylenol as needed for pain.  Expected Recovery:  You can expect some mild abdominal fullness and/or discomfort due to the air used to inflate your intestinal tract. It is also normal to have a mild sore throat after upper endoscopy.    Call Your Physician if You Have:  After Upper Endoscopy:  Shoulder, back or chest pain.  Difficulty breathing or swallowing.  Vomiting blood.      Any questions or concerns about your recovery, please call [phone] or after hours [email or web address] ([phone]) Nurse Advice Line.    Follow-up Care:  You did have polyps/biopsy tissue sample(s) removed.  The polyps/biopsy tissue sample(s) will be sent to pathology.    You should receive letter in your My Chart from Dr Kevin with your results within 1-2 weeks. If you do not participate in My Chart a physical letter will come in the mail in 2-3 weeks.  Please call if you have not received a notification of your results.  If asked to return to clinic please make an appointment 1 week after your procedure.  Call [phone].

## 2025-02-26 NOTE — LETTER
March 4, 2025      Hector PHANI Yair  [address]   [place] [address]        Dear ,    We are writing to inform you of your test results.    Your test results fall within the expected range(s) or remain unchanged from previous results.  Please continue with current treatment plan.    Resulted Orders   Surgical Pathology Exam   Result Value Ref Range    Case Report       Surgical Pathology Report                         Case: [id number]                                  Authorizing Provider:  Warren Kevin MD        Collected:           02/26/2025 01:00 PM          Ordering Location:     [facility]          Received:            02/26/2025 01:17 PM                                 [facility]                                                          Pathologist:           Cori Velasco MD                                                           Specimen:    Stomach, Antrum, Gastric biopsies for HPylori                                              Final Diagnosis       Stomach, biopsy-  - Chronic inactive gastritis.  - Negative for intestinal metaplasia, gastric epithelial dysplasia, or malignancy.  - Negative for diagnostic H. pylori forms within sampled tissue.  - Sampling includes: Gastric oxyntic mucosa.       Clinical Information       Nausea and vomiting.  H. pylori.  Normal stomach was reported endoscopically.      Gross Description       A(1). Stomach, Antrum, Gastric biopsies for HPylori:  The specimen is received in formalin, labeled with the patient's name, medical record number and other identifying information and designated  gastric biopsies . It consists of 4 tan soft tissue fragments ranging from 0.1-0.5 cm. Entirely submitted in one cassette.   (MAHNAZ Rogel)2/27/2025 9:18 AM       Microscopic Description       Microscopic examination is performed.  Helicobacter pylori immunoperoxidase stain is performed for further evaluation, using appropriate  controls; no diagnostic H. pylori forms are identified.        Performing Labs       The technical component of this testing was completed at [facility].    Stain controls for all stains resulted within this report have been reviewed and show appropriate reactivity.       Case Images         If you have any questions or concerns, please call the clinic at the number listed above.       Sincerely,      Warren Kevin MD    Electronically signed

## 2025-03-03 ENCOUNTER — APPOINTMENT (OUTPATIENT)
Dept: URBAN - METROPOLITAN AREA CLINIC 255 | Age: 20
Setting detail: DERMATOLOGY
End: 2025-03-04

## 2025-03-03 VITALS — HEIGHT: 69 IN | WEIGHT: 235 LBS

## 2025-03-03 DIAGNOSIS — L21.8 OTHER SEBORRHEIC DERMATITIS: ICD-10-CM

## 2025-03-03 DIAGNOSIS — L30.8 OTHER SPECIFIED DERMATITIS: ICD-10-CM

## 2025-03-03 DIAGNOSIS — L70.0 ACNE VULGARIS: ICD-10-CM

## 2025-03-03 LAB
PATH REPORT.COMMENTS IMP SPEC: NORMAL
PATH REPORT.COMMENTS IMP SPEC: NORMAL
PATH REPORT.FINAL DX SPEC: NORMAL
PATH REPORT.GROSS SPEC: NORMAL
PATH REPORT.MICROSCOPIC SPEC OTHER STN: NORMAL
PATH REPORT.RELEVANT HX SPEC: NORMAL
PHOTO IMAGE: NORMAL

## 2025-03-03 PROCEDURE — OTHER PRESCRIPTION MEDICATION MANAGEMENT: OTHER

## 2025-03-03 PROCEDURE — OTHER PRESCRIPTION: OTHER

## 2025-03-03 PROCEDURE — OTHER MIPS QUALITY: OTHER

## 2025-03-03 PROCEDURE — OTHER PATIENT SPECIFIC COUNSELING: OTHER

## 2025-03-03 PROCEDURE — OTHER COUNSELING: OTHER

## 2025-03-03 PROCEDURE — OTHER PHOTO-DOCUMENTATION: OTHER

## 2025-03-03 PROCEDURE — OTHER OTC TREATMENT REGIMEN: OTHER

## 2025-03-03 PROCEDURE — 99214 OFFICE O/P EST MOD 30 MIN: CPT

## 2025-03-03 RX ORDER — TRETIONIN 1 MG/G
0.1% CREAM TOPICAL QHS
Qty: 45 | Refills: 5 | Status: ERX | COMMUNITY
Start: 2025-03-03

## 2025-03-03 RX ORDER — CLINDAMYCIN PHOSPHATE 10 MG/ML
1% SOLUTION TOPICAL QAM
Qty: 60 | Refills: 5 | Status: ERX | COMMUNITY
Start: 2025-03-03

## 2025-03-03 RX ORDER — TRIAMCINOLONE ACETONIDE 1 MG/G
OINTMENT TOPICAL BID
Qty: 80 | Refills: 1 | Status: ERX | COMMUNITY
Start: 2025-03-03

## 2025-03-03 ASSESSMENT — LOCATION DETAILED DESCRIPTION DERM
LOCATION DETAILED: LEFT INFERIOR MEDIAL MALAR CHEEK
LOCATION DETAILED: HAIR

## 2025-03-03 ASSESSMENT — LOCATION SIMPLE DESCRIPTION DERM
LOCATION SIMPLE: HAIR
LOCATION SIMPLE: LEFT CHEEK

## 2025-03-03 ASSESSMENT — LOCATION ZONE DERM
LOCATION ZONE: SCALP
LOCATION ZONE: FACE

## 2025-03-03 NOTE — PROCEDURE: COUNSELING
High Dose Vitamin A Counseling: Side effects reviewed, pt to contact office should one occur.
Bactrim Pregnancy And Lactation Text: This medication is Pregnancy Category D and is known to cause fetal risk.  It is also excreted in breast milk.
Winlevi Pregnancy And Lactation Text: This medication is considered safe during pregnancy and breastfeeding.
Minocycline Counseling: Patient advised regarding possible photosensitivity and discoloration of the teeth, skin, lips, tongue and gums.  Patient instructed to avoid sunlight, if possible.  When exposed to sunlight, patients should wear protective clothing, sunglasses, and sunscreen.  The patient was instructed to call the office immediately if the following severe adverse effects occur:  hearing changes, easy bruising/bleeding, severe headache, or vision changes.  The patient verbalized understanding of the proper use and possible adverse effects of minocycline.  All of the patient's questions and concerns were addressed.
Topical Clindamycin Counseling: Patient counseled that this medication may cause skin irritation or allergic reactions.  In the event of skin irritation, the patient was advised to reduce the amount of the drug applied or use it less frequently.   The patient verbalized understanding of the proper use and possible adverse effects of clindamycin.  All of the patient's questions and concerns were addressed.
Use Enhanced Medication Counseling?: No
Doxycycline Counseling:  Patient counseled regarding possible photosensitivity and increased risk for sunburn.  Patient instructed to avoid sunlight, if possible.  When exposed to sunlight, patients should wear protective clothing, sunglasses, and sunscreen.  The patient was instructed to call the office immediately if the following severe adverse effects occur:  hearing changes, easy bruising/bleeding, severe headache, or vision changes.  The patient verbalized understanding of the proper use and possible adverse effects of doxycycline.  All of the patient's questions and concerns were addressed.
Winlevi Counseling:  I discussed with the patient the risks of topical clascoterone including but not limited to erythema, scaling, itching, and stinging. Patient voiced their understanding.
Aklief Pregnancy And Lactation Text: It is unknown if this medication is safe to use during pregnancy.  It is unknown if this medication is excreted in breast milk.  Breastfeeding women should use the topical cream on the smallest area of the skin for the shortest time needed while breastfeeding.  Do not apply to nipple and areola.
Birth Control Pills Counseling: Birth Control Pill Counseling: I discussed with the patient the potential side effects of OCPs including but not limited to increased risk of stroke, heart attack, thrombophlebitis, deep venous thrombosis, hepatic adenomas, breast changes, GI upset, headaches, and depression.  The patient verbalized understanding of the proper use and possible adverse effects of OCPs. All of the patient's questions and concerns were addressed.
Patient Specific Counseling (Will Not Stick From Patient To Patient): She states she never started the spironolactone. She states it was to experience. \\nRecommended that she continue with the topical medications.\\nShe voices liking the clindamycin wipes instead of the lotion.
Detail Level: Zone
Topical Clindamycin Pregnancy And Lactation Text: This medication is Pregnancy Category B and is considered safe during pregnancy. It is unknown if it is excreted in breast milk.
Dapsone Counseling: I discussed with the patient the risks of dapsone including but not limited to hemolytic anemia, agranulocytosis, rashes, methemoglobinemia, kidney failure, peripheral neuropathy, headaches, GI upset, and liver toxicity.  Patients who start dapsone require monitoring including baseline LFTs and weekly CBCs for the first month, then every month thereafter.  The patient verbalized understanding of the proper use and possible adverse effects of dapsone.  All of the patient's questions and concerns were addressed.
Topical Sulfur Applications Pregnancy And Lactation Text: This medication is Pregnancy Category C and has an unknown safety profile during pregnancy. It is unknown if this topical medication is excreted in breast milk.
Tetracycline Pregnancy And Lactation Text: This medication is Pregnancy Category D and not consider safe during pregnancy. It is also excreted in breast milk.
Benzoyl Peroxide Counseling: Patient counseled that medicine may cause skin irritation and bleach clothing.  In the event of skin irritation, the patient was advised to reduce the amount of the drug applied or use it less frequently.   The patient verbalized understanding of the proper use and possible adverse effects of benzoyl peroxide.  All of the patient's questions and concerns were addressed.
Erythromycin Counseling:  I discussed with the patient the risks of erythromycin including but not limited to GI upset, allergic reaction, drug rash, diarrhea, increase in liver enzymes, and yeast infections.
Azelaic Acid Counseling: Patient counseled that medicine may cause skin irritation and to avoid applying near the eyes.  In the event of skin irritation, the patient was advised to reduce the amount of the drug applied or use it less frequently.   The patient verbalized understanding of the proper use and possible adverse effects of azelaic acid.  All of the patient's questions and concerns were addressed.
Topical Retinoid counseling:  Patient advised to apply a pea-sized amount only at bedtime and wait 30 minutes after washing their face before applying.  If too drying, patient may add a non-comedogenic moisturizer. The patient verbalized understanding of the proper use and possible adverse effects of retinoids.  All of the patient's questions and concerns were addressed.
Sarecycline Counseling: Patient advised regarding possible photosensitivity and discoloration of the teeth, skin, lips, tongue and gums.  Patient instructed to avoid sunlight, if possible.  When exposed to sunlight, patients should wear protective clothing, sunglasses, and sunscreen.  The patient was instructed to call the office immediately if the following severe adverse effects occur:  hearing changes, easy bruising/bleeding, severe headache, or vision changes.  The patient verbalized understanding of the proper use and possible adverse effects of sarecycline.  All of the patient's questions and concerns were addressed.
Isotretinoin Counseling: Patient should get monthly blood tests, not donate blood, not drive at night if vision affected, not share medication, and not undergo elective surgery for 6 months after tx completed. Side effects reviewed, pt to contact office should one occur.
Bactrim Counseling:  I discussed with the patient the risks of sulfa antibiotics including but not limited to GI upset, allergic reaction, drug rash, diarrhea, dizziness, photosensitivity, and yeast infections.  Rarely, more serious reactions can occur including but not limited to aplastic anemia, agranulocytosis, methemoglobinemia, blood dyscrasias, liver or kidney failure, lung infiltrates or desquamative/blistering drug rashes.
Isotretinoin Pregnancy And Lactation Text: This medication is Pregnancy Category X and is considered extremely dangerous during pregnancy. It is unknown if it is excreted in breast milk.
Spironolactone Pregnancy And Lactation Text: This medication can cause feminization of the male fetus and should be avoided during pregnancy. The active metabolite is also found in breast milk.
Benzoyl Peroxide Pregnancy And Lactation Text: This medication is Pregnancy Category C. It is unknown if benzoyl peroxide is excreted in breast milk.
Topical Retinoid Pregnancy And Lactation Text: This medication is Pregnancy Category C. It is unknown if this medication is excreted in breast milk.
Erythromycin Pregnancy And Lactation Text: This medication is Pregnancy Category B and is considered safe during pregnancy. It is also excreted in breast milk.
Tetracycline Counseling: Patient counseled regarding possible photosensitivity and increased risk for sunburn.  Patient instructed to avoid sunlight, if possible.  When exposed to sunlight, patients should wear protective clothing, sunglasses, and sunscreen.  The patient was instructed to call the office immediately if the following severe adverse effects occur:  hearing changes, easy bruising/bleeding, severe headache, or vision changes.  The patient verbalized understanding of the proper use and possible adverse effects of tetracycline.  All of the patient's questions and concerns were addressed. Patient understands to avoid pregnancy while on therapy due to potential birth defects.
Tazorac Pregnancy And Lactation Text: This medication is not safe during pregnancy. It is unknown if this medication is excreted in breast milk.
Tazorac Counseling:  Patient advised that medication is irritating and drying.  Patient may need to apply sparingly and wash off after an hour before eventually leaving it on overnight.  The patient verbalized understanding of the proper use and possible adverse effects of tazorac.  All of the patient's questions and concerns were addressed.
High Dose Vitamin A Pregnancy And Lactation Text: High dose vitamin A therapy is contraindicated during pregnancy and breast feeding.
Aklief counseling:  Patient advised to apply a pea-sized amount only at bedtime and wait 30 minutes after washing their face before applying.  If too drying, patient may add a non-comedogenic moisturizer.  The most commonly reported side effects including irritation, redness, scaling, dryness, stinging, burning, itching, and increased risk of sunburn.  The patient verbalized understanding of the proper use and possible adverse effects of retinoids.  All of the patient's questions and concerns were addressed.
Birth Control Pills Pregnancy And Lactation Text: This medication should be avoided if pregnant and for the first 30 days post-partum.
Spironolactone Counseling: Patient advised regarding risks of diarrhea, abdominal pain, hyperkalemia, birth defects (for female patients), liver toxicity and renal toxicity. The patient may need blood work to monitor liver and kidney function and potassium levels while on therapy. The patient verbalized understanding of the proper use and possible adverse effects of spironolactone.  All of the patient's questions and concerns were addressed.
Azithromycin Counseling:  I discussed with the patient the risks of azithromycin including but not limited to GI upset, allergic reaction, drug rash, diarrhea, and yeast infections.
Topical Sulfur Applications Counseling: Topical Sulfur Counseling: Patient counseled that this medication may cause skin irritation or allergic reactions.  In the event of skin irritation, the patient was advised to reduce the amount of the drug applied or use it less frequently.   The patient verbalized understanding of the proper use and possible adverse effects of topical sulfur application.  All of the patient's questions and concerns were addressed.
Azithromycin Pregnancy And Lactation Text: This medication is considered safe during pregnancy and is also secreted in breast milk.
Dapsone Pregnancy And Lactation Text: This medication is Pregnancy Category C and is not considered safe during pregnancy or breast feeding.
Doxycycline Pregnancy And Lactation Text: This medication is Pregnancy Category D and not consider safe during pregnancy. It is also excreted in breast milk but is considered safe for shorter treatment courses.
Azelaic Acid Pregnancy And Lactation Text: This medication is considered safe during pregnancy and breast feeding.
Detail Level: Detailed

## 2025-03-03 NOTE — HPI: PIMPLES (ACNE)
What Type Of Note Output Would You Prefer (Optional)?: Standard Output
Is This A New Presentation, Or A Follow-Up?: Follow Up Acne
Additional Comments (Use Complete Sentences): Would use tretinoin, clindamycin once daily with good relief. Interested in restarting.

## 2025-03-03 NOTE — HPI: RASH
What Type Of Note Output Would You Prefer (Optional)?: Standard Output
Is This A New Presentation, Or A Follow-Up?: Rash
Additional History: Has tried OTC Pravin gems without relief.
Additional History: Uses hand  consistently at work. Washes dishes often.

## 2025-03-03 NOTE — PROCEDURE: OTC TREATMENT REGIMEN
Patient Specific Otc Recommendations (Will Not Stick From Patient To Patient): Anti-dandruff shampoo
Detail Level: Zone

## 2025-03-03 NOTE — PROCEDURE: PRESCRIPTION MEDICATION MANAGEMENT
Detail Level: Zone
Initiate Treatment: Apply triamcinolone acetonide 0.1% topical ointment BID for 2 weeks. Then for flares as needed.
Render In Strict Bullet Format?: No
Plan: F/u in 1 year or sooner if needed.
Continue Regimen: Continue Tretinoin and clindamycin

## 2025-03-03 NOTE — PROCEDURE: PATIENT SPECIFIC COUNSELING
Detail Level: Zone
-Recommended topical steroid, triamcinolone acetonide 0.1% ointment, to reduce dryness of hands.\\n-Pt is agreeable to plan.
-Will restart tretinoin and clindamycin.\\n-recommended tretinoin over exfoliants.
-Recommended anti-dandruff shampoo for long term care.\\n-Pt agreeable to plan.

## 2025-06-01 ENCOUNTER — HOSPITAL ENCOUNTER (INPATIENT)
Facility: CLINIC | Age: 20
LOS: 2 days | Discharge: HOME | End: 2025-06-05
Attending: EMERGENCY MEDICINE | Admitting: INTERNAL MEDICINE
Payer: COMMERCIAL

## 2025-06-01 ENCOUNTER — APPOINTMENT (OUTPATIENT)
Dept: GENERAL RADIOLOGY | Facility: CLINIC | Age: 20
End: 2025-06-01
Attending: EMERGENCY MEDICINE
Payer: COMMERCIAL

## 2025-06-01 DIAGNOSIS — R11.2 INTRACTABLE NAUSEA AND VOMITING: Primary | ICD-10-CM

## 2025-06-01 DIAGNOSIS — R10.13 EPIGASTRIC PAIN: ICD-10-CM

## 2025-06-01 DIAGNOSIS — G47.00 INSOMNIA, UNSPECIFIED TYPE: ICD-10-CM

## 2025-06-01 LAB
ALBUMIN SERPL BCG-MCNC: 4.5 G/DL (ref 3.5–5.2)
ALBUMIN UR-MCNC: 100 MG/DL
ALP SERPL-CCNC: 87 U/L (ref 40–150)
ALT SERPL W P-5'-P-CCNC: 25 U/L (ref 0–50)
ANION GAP SERPL CALCULATED.3IONS-SCNC: 18 MMOL/L (ref 7–15)
APPEARANCE UR: CLEAR
AST SERPL W P-5'-P-CCNC: 26 U/L (ref 0–45)
BASOPHILS # BLD AUTO: 0 10E3/UL (ref 0–0.2)
BASOPHILS NFR BLD AUTO: 0 %
BILIRUB SERPL-MCNC: 0.5 MG/DL
BILIRUB UR QL STRIP: NEGATIVE
BUN SERPL-MCNC: 12.1 MG/DL (ref 6–20)
CALCIUM SERPL-MCNC: 8.8 MG/DL (ref 8.8–10.4)
CHLORIDE SERPL-SCNC: 99 MMOL/L (ref 98–107)
COLOR UR AUTO: YELLOW
CREAT SERPL-MCNC: 0.74 MG/DL (ref 0.51–0.95)
D DIMER PPP FEU-MCNC: <0.27 UG/ML FEU (ref 0–0.5)
EGFRCR SERPLBLD CKD-EPI 2021: >90 ML/MIN/1.73M2
EOSINOPHIL # BLD AUTO: 0 10E3/UL (ref 0–0.7)
EOSINOPHIL NFR BLD AUTO: 0 %
ERYTHROCYTE [DISTWIDTH] IN BLOOD BY AUTOMATED COUNT: 17.3 % (ref 10–15)
FLUAV RNA SPEC QL NAA+PROBE: NEGATIVE
FLUBV RNA RESP QL NAA+PROBE: NEGATIVE
GLUCOSE SERPL-MCNC: 91 MG/DL (ref 70–99)
GLUCOSE UR STRIP-MCNC: NEGATIVE MG/DL
HCG SERPL QL: NEGATIVE
HCO3 SERPL-SCNC: 21 MMOL/L (ref 22–29)
HCT VFR BLD AUTO: 37 % (ref 35–47)
HGB BLD-MCNC: 11.1 G/DL (ref 11.7–15.7)
HGB UR QL STRIP: NEGATIVE
HOLD SPECIMEN: NORMAL
HOLD SPECIMEN: NORMAL
IMM GRANULOCYTES # BLD: 0 10E3/UL
IMM GRANULOCYTES NFR BLD: 0 %
KETONES UR STRIP-MCNC: 100 MG/DL
LEUKOCYTE ESTERASE UR QL STRIP: ABNORMAL
LYMPHOCYTES # BLD AUTO: 1.3 10E3/UL (ref 0.8–5.3)
LYMPHOCYTES NFR BLD AUTO: 17 %
MAGNESIUM SERPL-MCNC: 1.9 MG/DL (ref 1.7–2.3)
MCH RBC QN AUTO: 21.9 PG (ref 26.5–33)
MCHC RBC AUTO-ENTMCNC: 30 G/DL (ref 31.5–36.5)
MCV RBC AUTO: 73 FL (ref 78–100)
MONOCYTES # BLD AUTO: 0.2 10E3/UL (ref 0–1.3)
MONOCYTES NFR BLD AUTO: 3 %
MUCOUS THREADS #/AREA URNS LPF: PRESENT /LPF
NEUTROPHILS # BLD AUTO: 6.3 10E3/UL (ref 1.6–8.3)
NEUTROPHILS NFR BLD AUTO: 80 %
NITRATE UR QL: NEGATIVE
NRBC # BLD AUTO: 0 10E3/UL
NRBC BLD AUTO-RTO: 0 /100
NT-PROBNP SERPL-MCNC: 145 PG/ML (ref 0–178)
PH UR STRIP: 6 [PH] (ref 5–7)
PLATELET # BLD AUTO: 531 10E3/UL (ref 150–450)
POTASSIUM SERPL-SCNC: 3.4 MMOL/L (ref 3.4–5.3)
PROT SERPL-MCNC: 7.7 G/DL (ref 6.4–8.3)
RBC # BLD AUTO: 5.08 10E6/UL (ref 3.8–5.2)
RBC URINE: 5 /HPF
RSV RNA SPEC NAA+PROBE: NEGATIVE
SARS-COV-2 RNA RESP QL NAA+PROBE: NEGATIVE
SODIUM SERPL-SCNC: 138 MMOL/L (ref 135–145)
SP GR UR STRIP: 1.03 (ref 1–1.03)
SQUAMOUS EPITHELIAL: 1 /HPF
TROPONIN T SERPL HS-MCNC: 6 NG/L
TROPONIN T SERPL HS-MCNC: <6 NG/L
TROPONIN T SERPL HS-MCNC: <6 NG/L
TSH SERPL DL<=0.005 MIU/L-ACNC: 0.39 UIU/ML (ref 0.3–4.2)
UROBILINOGEN UR STRIP-MCNC: 2 MG/DL
WBC # BLD AUTO: 7.9 10E3/UL (ref 4–11)
WBC URINE: 4 /HPF

## 2025-06-01 PROCEDURE — 84703 CHORIONIC GONADOTROPIN ASSAY: CPT | Performed by: EMERGENCY MEDICINE

## 2025-06-01 PROCEDURE — 250N000011 HC RX IP 250 OP 636: Performed by: EMERGENCY MEDICINE

## 2025-06-01 PROCEDURE — 250N000011 HC RX IP 250 OP 636: Performed by: HOSPITALIST

## 2025-06-01 PROCEDURE — 99285 EMERGENCY DEPT VISIT HI MDM: CPT | Mod: 25

## 2025-06-01 PROCEDURE — 81001 URINALYSIS AUTO W/SCOPE: CPT | Performed by: EMERGENCY MEDICINE

## 2025-06-01 PROCEDURE — 84484 ASSAY OF TROPONIN QUANT: CPT | Performed by: HOSPITALIST

## 2025-06-01 PROCEDURE — G0378 HOSPITAL OBSERVATION PER HR: HCPCS

## 2025-06-01 PROCEDURE — 36415 COLL VENOUS BLD VENIPUNCTURE: CPT | Performed by: EMERGENCY MEDICINE

## 2025-06-01 PROCEDURE — 93005 ELECTROCARDIOGRAM TRACING: CPT

## 2025-06-01 PROCEDURE — 87637 SARSCOV2&INF A&B&RSV AMP PRB: CPT | Performed by: EMERGENCY MEDICINE

## 2025-06-01 PROCEDURE — 96375 TX/PRO/DX INJ NEW DRUG ADDON: CPT

## 2025-06-01 PROCEDURE — 99223 1ST HOSP IP/OBS HIGH 75: CPT | Performed by: HOSPITALIST

## 2025-06-01 PROCEDURE — 84443 ASSAY THYROID STIM HORMONE: CPT | Performed by: EMERGENCY MEDICINE

## 2025-06-01 PROCEDURE — 258N000003 HC RX IP 258 OP 636: Performed by: EMERGENCY MEDICINE

## 2025-06-01 PROCEDURE — 84484 ASSAY OF TROPONIN QUANT: CPT | Performed by: EMERGENCY MEDICINE

## 2025-06-01 PROCEDURE — 71046 X-RAY EXAM CHEST 2 VIEWS: CPT

## 2025-06-01 PROCEDURE — 96374 THER/PROPH/DIAG INJ IV PUSH: CPT

## 2025-06-01 PROCEDURE — 83880 ASSAY OF NATRIURETIC PEPTIDE: CPT | Performed by: EMERGENCY MEDICINE

## 2025-06-01 PROCEDURE — 36416 COLLJ CAPILLARY BLOOD SPEC: CPT | Performed by: EMERGENCY MEDICINE

## 2025-06-01 PROCEDURE — 85025 COMPLETE CBC W/AUTO DIFF WBC: CPT | Performed by: EMERGENCY MEDICINE

## 2025-06-01 PROCEDURE — 80053 COMPREHEN METABOLIC PANEL: CPT | Performed by: EMERGENCY MEDICINE

## 2025-06-01 PROCEDURE — 250N000013 HC RX MED GY IP 250 OP 250 PS 637: Performed by: EMERGENCY MEDICINE

## 2025-06-01 PROCEDURE — 85379 FIBRIN DEGRADATION QUANT: CPT | Performed by: EMERGENCY MEDICINE

## 2025-06-01 PROCEDURE — 258N000003 HC RX IP 258 OP 636: Performed by: HOSPITALIST

## 2025-06-01 PROCEDURE — 83735 ASSAY OF MAGNESIUM: CPT | Performed by: EMERGENCY MEDICINE

## 2025-06-01 PROCEDURE — 36415 COLL VENOUS BLD VENIPUNCTURE: CPT | Performed by: HOSPITALIST

## 2025-06-01 RX ORDER — SUCRALFATE ORAL 1 G/10ML
1 SUSPENSION ORAL ONCE
Status: COMPLETED | OUTPATIENT
Start: 2025-06-01 | End: 2025-06-01

## 2025-06-01 RX ORDER — ONDANSETRON 4 MG/1
4 TABLET, ORALLY DISINTEGRATING ORAL EVERY 6 HOURS PRN
Status: DISCONTINUED | OUTPATIENT
Start: 2025-06-01 | End: 2025-06-02

## 2025-06-01 RX ORDER — PROCHLORPERAZINE MALEATE 5 MG/1
10 TABLET ORAL EVERY 6 HOURS PRN
Status: DISCONTINUED | OUTPATIENT
Start: 2025-06-01 | End: 2025-06-02

## 2025-06-01 RX ORDER — ONDANSETRON 2 MG/ML
4 INJECTION INTRAMUSCULAR; INTRAVENOUS EVERY 6 HOURS PRN
Status: DISCONTINUED | OUTPATIENT
Start: 2025-06-01 | End: 2025-06-02

## 2025-06-01 RX ORDER — MORPHINE SULFATE 4 MG/ML
4 INJECTION, SOLUTION INTRAMUSCULAR; INTRAVENOUS ONCE
Refills: 0 | Status: DISCONTINUED | OUTPATIENT
Start: 2025-06-01 | End: 2025-06-01

## 2025-06-01 RX ORDER — ACETAMINOPHEN 325 MG/1
650 TABLET ORAL EVERY 4 HOURS PRN
Status: DISCONTINUED | OUTPATIENT
Start: 2025-06-01 | End: 2025-06-05 | Stop reason: HOSPADM

## 2025-06-01 RX ORDER — AMOXICILLIN 250 MG
1 CAPSULE ORAL 2 TIMES DAILY PRN
Status: DISCONTINUED | OUTPATIENT
Start: 2025-06-01 | End: 2025-06-05 | Stop reason: HOSPADM

## 2025-06-01 RX ORDER — ONDANSETRON 2 MG/ML
4 INJECTION INTRAMUSCULAR; INTRAVENOUS ONCE
Status: COMPLETED | OUTPATIENT
Start: 2025-06-01 | End: 2025-06-01

## 2025-06-01 RX ORDER — HYDROXYZINE HYDROCHLORIDE 25 MG/1
25 TABLET, FILM COATED ORAL ONCE
Status: COMPLETED | OUTPATIENT
Start: 2025-06-01 | End: 2025-06-01

## 2025-06-01 RX ORDER — ONDANSETRON 4 MG/1
4 TABLET, ORALLY DISINTEGRATING ORAL ONCE
Status: COMPLETED | OUTPATIENT
Start: 2025-06-01 | End: 2025-06-01

## 2025-06-01 RX ORDER — AMOXICILLIN 250 MG
2 CAPSULE ORAL 2 TIMES DAILY PRN
Status: DISCONTINUED | OUTPATIENT
Start: 2025-06-01 | End: 2025-06-05 | Stop reason: HOSPADM

## 2025-06-01 RX ORDER — LORAZEPAM 2 MG/ML
1 INJECTION INTRAMUSCULAR ONCE
Status: COMPLETED | OUTPATIENT
Start: 2025-06-01 | End: 2025-06-01

## 2025-06-01 RX ORDER — SODIUM CHLORIDE 9 MG/ML
INJECTION, SOLUTION INTRAVENOUS CONTINUOUS
Status: DISCONTINUED | OUTPATIENT
Start: 2025-06-01 | End: 2025-06-05 | Stop reason: HOSPADM

## 2025-06-01 RX ORDER — MAGNESIUM SULFATE HEPTAHYDRATE 40 MG/ML
2 INJECTION, SOLUTION INTRAVENOUS ONCE
Status: COMPLETED | OUTPATIENT
Start: 2025-06-01 | End: 2025-06-01

## 2025-06-01 RX ORDER — ACETAMINOPHEN 650 MG/1
650 SUPPOSITORY RECTAL EVERY 4 HOURS PRN
Status: DISCONTINUED | OUTPATIENT
Start: 2025-06-01 | End: 2025-06-05 | Stop reason: HOSPADM

## 2025-06-01 RX ORDER — MAGNESIUM HYDROXIDE/ALUMINUM HYDROXICE/SIMETHICONE 120; 1200; 1200 MG/30ML; MG/30ML; MG/30ML
15 SUSPENSION ORAL ONCE
Status: COMPLETED | OUTPATIENT
Start: 2025-06-01 | End: 2025-06-01

## 2025-06-01 RX ADMIN — SUCRALFATE ORAL 1 G: 1 SUSPENSION ORAL at 17:30

## 2025-06-01 RX ADMIN — HYDROXYZINE HYDROCHLORIDE 25 MG: 25 TABLET, FILM COATED ORAL at 17:31

## 2025-06-01 RX ADMIN — MAGNESIUM SULFATE HEPTAHYDRATE 2 G: 40 INJECTION, SOLUTION INTRAVENOUS at 18:21

## 2025-06-01 RX ADMIN — SODIUM CHLORIDE: 0.9 INJECTION, SOLUTION INTRAVENOUS at 22:31

## 2025-06-01 RX ADMIN — SODIUM CHLORIDE, SODIUM LACTATE, POTASSIUM CHLORIDE, AND CALCIUM CHLORIDE 1000 ML: .6; .31; .03; .02 INJECTION, SOLUTION INTRAVENOUS at 17:30

## 2025-06-01 RX ADMIN — ONDANSETRON 4 MG: 4 TABLET, ORALLY DISINTEGRATING ORAL at 22:28

## 2025-06-01 RX ADMIN — ONDANSETRON 4 MG: 2 INJECTION, SOLUTION INTRAMUSCULAR; INTRAVENOUS at 17:30

## 2025-06-01 RX ADMIN — ONDANSETRON 4 MG: 4 TABLET, ORALLY DISINTEGRATING ORAL at 14:26

## 2025-06-01 RX ADMIN — ALUMINUM HYDROXIDE, MAGNESIUM HYDROXIDE, AND SIMETHICONE 15 ML: 200; 200; 20 SUSPENSION ORAL at 17:29

## 2025-06-01 RX ADMIN — LORAZEPAM 1 MG: 2 INJECTION INTRAMUSCULAR; INTRAVENOUS at 20:29

## 2025-06-01 ASSESSMENT — ACTIVITIES OF DAILY LIVING (ADL)
ADLS_ACUITY_SCORE: 46
ADLS_ACUITY_SCORE: 47
ADLS_ACUITY_SCORE: 46
ADLS_ACUITY_SCORE: 46

## 2025-06-01 ASSESSMENT — COLUMBIA-SUICIDE SEVERITY RATING SCALE - C-SSRS
2. HAVE YOU ACTUALLY HAD ANY THOUGHTS OF KILLING YOURSELF IN THE PAST MONTH?: NO
1. IN THE PAST MONTH, HAVE YOU WISHED YOU WERE DEAD OR WISHED YOU COULD GO TO SLEEP AND NOT WAKE UP?: NO
6. HAVE YOU EVER DONE ANYTHING, STARTED TO DO ANYTHING, OR PREPARED TO DO ANYTHING TO END YOUR LIFE?: NO

## 2025-06-01 NOTE — ED PROVIDER NOTES
"  Emergency Department Note      History of Present Illness     Chief Complaint   Nausea, Vomiting, & Diarrhea    HPI   Hector Mazariegos is a 19 year old female with history of anxiety who presents to the ED for evaluation of nausea, vomiting, and diarrhea. The patient reports that the past 4 days, she's been stressed from having to plan her sister's graduation and a friend's bridal shower. This stress has caused her to feel anxious, which then caused her to develop abdominal pain. Her pain is described as squeezing that radiates up her chest and throat, causing a burning sensation. This pain has caused her to reduce her eating, but she was able to have 2 cups of chicken broth last night. Patient also is vomiting from the anxiety and notes specs of red in her emesis, but this might be from her Gatorade. Patient also reports she had a syncopal episode this morning when she got up to use the bathroom. She states she stood up and felt lightheaded with \"black vision\". She didn't fall, but when she woke up people were giving her water. Patient also endorses diarrhea, decreased urination, and cold sweats. Denies hematochezia, trouble breathing, rhinorrhea, nasal congestion, or cough. She tried taking omeprazole, but threw up after taking this. Patient also tried taking a hot shower to alleviate her symptoms. Includes history of IBS, panic attacks, and a PE after her breast reduction surgery. She endorses occasional vaping, but states she is vaping less. Also mentions she will sometimes take a low dose of an edible to help her sleep when she is feeling anxious. Denies alcohol use. Denies family history of early heart attacks or sudden cardiac deaths.    Independent Historian   None    Review of External Notes   I reviewed the 2/25/25 H&P for GI EGD with biopsy.   I reviewed the 2/26/25 upper GI EGD, which was normal.  I reviewed the 1/26/25 [facility] ED visit note.    Past Medical History     Medical History and Problem List " "  Acute gastritis  Anxiety  Depression  Distension of hepatic veins and IVC  H. Pylori   IBS  Iron deficiency anemia   Macromastia   Obesity  PE involving right lung  Sinus bradycardia  Vitamin D deficiency     Medications   Achromycin  Aldactone   Carafate  Compazine   Diflucan  Flagyl  Imodium   Prilosec  Protonix   Reglan   Zofran    Surgical History   Bilateral breast reduction  EGD with biopsy    Physical Exam     Patient Vitals for the past 24 hrs:   BP Temp Pulse Resp SpO2 Height Weight   06/01/25 1955 (!) 145/82 -- -- -- -- -- --   06/01/25 1951 (!) 142/94 -- 53 -- -- -- --   06/01/25 1949 (!) 141/86 -- (!) 47 -- -- -- --   06/01/25 1948 (!) 141/86 -- -- -- -- -- --   06/01/25 1947 (!) 141/86 -- (!) 47 -- -- -- --   06/01/25 1900 (!) 164/107 -- (!) 44 22 -- -- --   06/01/25 1830 (!) 150/109 -- 56 20 -- -- --   06/01/25 1421 128/66 98.2  F (36.8  C) 50 20 100 % 1.753 m (5' 9\") 112.2 kg (247 lb 5.7 oz)     Physical Exam  Constitutional:       Appearance: Normal appearance.   HENT:      Head: Normocephalic and atraumatic.   Eyes:      Extraocular Movements: Extraocular movements intact.      Conjunctiva/sclera: Conjunctivae normal.   Cardiovascular:      Rate and Rhythm: Normal rate and regular rhythm.   Pulmonary:      Effort: Pulmonary effort is normal. No respiratory distress.      Breath sounds: Clear to auscultation bilaterally.  No wheezing.  No crackles.  Abdominal:      General: Abdomen is flat. There is no distension.      Palpations: Abdomen is soft.      Tenderness: There is generalized abdominal discomfort to palpation worse in the epigastrium.   Musculoskeletal:      Cervical back: Normal range of motion. No rigidity.       Right lower leg: No edema.      Left lower leg: No edema.   Skin:     General: Skin is warm and dry.   Neurological:      General: No focal deficit present.      Mental Status: Alert and oriented to person, place, and time.   Psychiatric:         Mood and Affect: Mood normal.    "      Behavior: Behavior normal.    Diagnostics     Lab Results   Labs Ordered and Resulted from Time of ED Arrival to Time of ED Departure   COMPREHENSIVE METABOLIC PANEL - Abnormal       Result Value    Sodium 138      Potassium 3.4      Carbon Dioxide (CO2) 21 (*)     Anion Gap 18 (*)     Urea Nitrogen 12.1      Creatinine 0.74      GFR Estimate >90      Calcium 8.8      Chloride 99      Glucose 91      Alkaline Phosphatase 87      AST 26      ALT 25      Protein Total 7.7      Albumin 4.5      Bilirubin Total 0.5     CBC WITH PLATELETS AND DIFFERENTIAL - Abnormal    WBC Count 7.9      RBC Count 5.08      Hemoglobin 11.1 (*)     Hematocrit 37.0      MCV 73 (*)     MCH 21.9 (*)     MCHC 30.0 (*)     RDW 17.3 (*)     Platelet Count 531 (*)     % Neutrophils 80      % Lymphocytes 17      % Monocytes 3      % Eosinophils 0      % Basophils 0      % Immature Granulocytes 0      NRBCs per 100 WBC 0      Absolute Neutrophils 6.3      Absolute Lymphocytes 1.3      Absolute Monocytes 0.2      Absolute Eosinophils 0.0      Absolute Basophils 0.0      Absolute Immature Granulocytes 0.0      Absolute NRBCs 0.0     ROUTINE UA WITH MICROSCOPIC REFLEX TO CULTURE - Abnormal    Color Urine Yellow      Appearance Urine Clear      Glucose Urine Negative      Bilirubin Urine Negative      Ketones Urine 100 (*)     Specific Gravity Urine 1.030      Blood Urine Negative      pH Urine 6.0      Protein Albumin Urine 100 (*)     Urobilinogen Urine 2.0 (*)     Nitrite Urine Negative      Leukocyte Esterase Urine Trace (*)     Mucus Urine Present (*)     RBC Urine 5 (*)     WBC Urine 4      Squamous Epithelials Urine 1     D DIMER QUANTITATIVE - Normal    D-Dimer Quantitative <0.27     TROPONIN T, HIGH SENSITIVITY - Normal    Troponin T, High Sensitivity 6     MAGNESIUM - Normal    Magnesium 1.9     TSH WITH FREE T4 REFLEX - Normal    TSH 0.39     HCG QUALITATIVE PREGNANCY - Normal    hCG Serum Qualitative Negative     NT-PROBNP - Normal     NT-proBNP 145     INFLUENZA A/B, RSV AND SARS-COV2 PCR - Normal    Influenza A PCR Negative      Influenza B PCR Negative      RSV PCR Negative      SARS CoV2 PCR Negative     TROPONIN T, HIGH SENSITIVITY     Imaging   XR Chest 2 Views   Final Result   IMPRESSION: Negative chest.        EKG   ECG results from 06/01/25   EKG 12-lead, tracing only     Value    Systolic Blood Pressure     Diastolic Blood Pressure     Ventricular Rate 59    Atrial Rate 59    NC Interval 134    QRS Duration 86        QTc 506    P Axis 18    R AXIS 35    T Axis 33    Interpretation ECG      Sinus bradycardia with marked sinus arrhythmia  Prolonged QT  Abnormal ECG  When compared with ECG of 17-Jan-2025 17:22,  T wave amplitude has increased in Anterior leads  QT has lengthened     Reviewed by Jameel Patterson DO. See ED course for independent interpretation.      Independent Interpretation   See ED course for interpretation.    ED Course      Medications Administered   Medications   ondansetron (ZOFRAN ODT) ODT tab 4 mg (4 mg Oral $Given 6/1/25 1426)   lactated ringers BOLUS 1,000 mL (1,000 mLs Intravenous $New Bag 6/1/25 1730)   alum & mag hydroxide-simethicone (MAALOX) suspension 15 mL (15 mLs Oral $Given 6/1/25 1729)   sucralfate (CARAFATE) suspension 1 g (1 g Oral $Given 6/1/25 1730)   ondansetron (ZOFRAN) injection 4 mg (4 mg Intravenous $Given 6/1/25 1730)   hydrOXYzine HCl (ATARAX) tablet 25 mg (25 mg Oral $Given 6/1/25 1731)   magnesium sulfate 2 g in 50 mL sterile water intermittent infusion (2 g Intravenous $New Bag 6/1/25 1821)   LORazepam (ATIVAN) injection 1 mg (1 mg Intravenous $Given 6/1/25 2029)     Procedures   None      Discussion of Management   Admitting Hospitalist, Dr. Muller    ED Course   ED Course as of 06/01/25 2106   Sun Jun 01, 2025   1704 I obtained history and examined the patient as noted above.    1742 D-Dimer Quantitative: <0.27  Unlikely dissection/PE, chest x-ray ordered   1804 EKG 12-lead, tracing  only  Sinus bradycardia.  Rate of 59.  Normal ND and QRS.  Prolonged QTc 526.  Likely secondary to antiemetics.  Will administer IV magnesium. No acute ST elevation depression as compared 1/17/2025 EKG.   2003 XR Chest 2 Views  On my independent, there is no obvious focal opacity, mediastinal widening, or pneumothorax.   2036 I rechecked and updated the patient. Still endorsing nausea, does not feel comfortable with discharge home.   2054 I consulted with admitting hospitalist, Dr. Muller, regarding admission. Discussed patient's presentation, findings, and plan of care.      Additional Documentation  None    Medical Decision Making / Diagnosis     CMS Diagnoses: None    MIPS   None           [facility]   Hector Mazariegos is a 19 year old female as described above presents to the emergency department for syncopal episode today with increased stressors and anxiety recently, orthostatic dizziness, and generalized abdominal discomfort and reflux type sensation.  Patient hemodynamically stable at time of evaluation.  Afebrile.  Unable to keep down omeprazole as prescribed previously by gastroenterologist for H. pylori.  At time of evaluation, patient's symptoms  most consistent with GERD/increased acid production secondary to increased stressors recently and potentially development of gastritis/PUD.  Based on reports, syncopal episode today most likely orthostatic/vasovagal nature especially given lack of oral fluid intake recently given patient's anxiety and GI symptoms.  Nonetheless, will evaluate for cardiac cause of syncope, though less likely especially given patient's young age and no prior risk factors.  Evaluate for pericarditis/myocarditis/ACS.  Cardiac enzyme testing.  Does report history of thrombosis postoperatively from breast reduction procedure, will obtain D-dimer for screening for pulmonary embolism.  Trial of GI cocktail, Carafate, Zofran.  IV fluid hydration.  Hydroxyzine for anxiety.  Patient comfortable  with deferring advanced imaging of the abdomen at this time as to avoid radiation exposure given patient's young age, but if significant electrolyte derangements or no improvement in pain after treatment, will consider expansion workup with advanced imaging.  Discussed care plan patient voiced understanding and agreement with plan.  Answered all questions.  Additional workup and orders as listed in chart.    Ultimately, work up shows no major electrolyte derangements.  Mild anion gap elevation likely secondary to vomiting and lack of oral intake.  No significant leukocytosis.  Dimer negative for pulmonary embolism and less likely dissection especially in the setting of unremarkable chest x-ray. Cardiac enzyme testing negative for ACS.  Nonetheless, despite multiple modalities of nausea treatment, patient still endorsing nausea and epigastric discomfort.  Patient was unable to keep down GI cocktail.  Ultimately, patient does not feel comfortable with discharge home at this time.  Hospitalist consulted for admission for further nausea treatment and IV fluid hydration.    Please refer to ED course above as part of continuation of MDM for details on the patient's treatment course and any potential changes or updates beyond my initial evaluation and MDM creation.    Disposition   The patient was admitted to the hospital.     Diagnosis     ICD-10-CM    1. Intractable nausea and vomiting  R11.2       2. Epigastric pain  R10.13          Scribe Disclosure:  INES HERNANDEZ, am serving as a scribe at 4:06 PM on 6/1/2025 to document services personally performed by Jameel Patterson DO based on my observations and the provider's statements to me.      Jameel Patterson DO  06/01/25 [id number]

## 2025-06-01 NOTE — ED TRIAGE NOTES
Pt endorses increased stress and feeling anxious. Yesterday she started to vomit, also has diarrhea. C/o abdominal pain that is twisting and burning all over abdomen. Today she feels pressure in her chest.      Triage Assessment (Adult)       Row Name 06/01/25 1418          Triage Assessment    Airway WDL WDL        Respiratory WDL    Respiratory WDL WDL        Skin Circulation/Temperature WDL    Skin Circulation/Temperature WDL WDL        Cardiac WDL    Cardiac WDL WDL        Peripheral/Neurovascular WDL    Peripheral Neurovascular WDL WDL        Cognitive/Neuro/Behavioral WDL    Cognitive/Neuro/Behavioral WDL WDL

## 2025-06-02 LAB
ATRIAL RATE - MUSE: 48 BPM
ATRIAL RATE - MUSE: 59 BPM
DIASTOLIC BLOOD PRESSURE - MUSE: NORMAL MMHG
DIASTOLIC BLOOD PRESSURE - MUSE: NORMAL MMHG
INTERPRETATION ECG - MUSE: NORMAL
INTERPRETATION ECG - MUSE: NORMAL
LIPASE SERPL-CCNC: 26 U/L (ref 13–60)
MAGNESIUM SERPL-MCNC: 2 MG/DL (ref 1.7–2.3)
P AXIS - MUSE: 18 DEGREES
P AXIS - MUSE: 9 DEGREES
POTASSIUM SERPL-SCNC: 3.5 MMOL/L (ref 3.4–5.3)
PR INTERVAL - MUSE: 134 MS
PR INTERVAL - MUSE: 134 MS
QRS DURATION - MUSE: 80 MS
QRS DURATION - MUSE: 86 MS
QT - MUSE: 512 MS
QT - MUSE: 524 MS
QTC - MUSE: 468 MS
QTC - MUSE: 506 MS
R AXIS - MUSE: 35 DEGREES
R AXIS - MUSE: 39 DEGREES
SYSTOLIC BLOOD PRESSURE - MUSE: NORMAL MMHG
SYSTOLIC BLOOD PRESSURE - MUSE: NORMAL MMHG
T AXIS - MUSE: 33 DEGREES
T AXIS - MUSE: 49 DEGREES
VENTRICULAR RATE- MUSE: 48 BPM
VENTRICULAR RATE- MUSE: 59 BPM

## 2025-06-02 PROCEDURE — 96376 TX/PRO/DX INJ SAME DRUG ADON: CPT

## 2025-06-02 PROCEDURE — 96375 TX/PRO/DX INJ NEW DRUG ADDON: CPT

## 2025-06-02 PROCEDURE — 84132 ASSAY OF SERUM POTASSIUM: CPT | Performed by: STUDENT IN AN ORGANIZED HEALTH CARE EDUCATION/TRAINING PROGRAM

## 2025-06-02 PROCEDURE — 250N000011 HC RX IP 250 OP 636: Performed by: STUDENT IN AN ORGANIZED HEALTH CARE EDUCATION/TRAINING PROGRAM

## 2025-06-02 PROCEDURE — 250N000013 HC RX MED GY IP 250 OP 250 PS 637: Performed by: INTERNAL MEDICINE

## 2025-06-02 PROCEDURE — 250N000013 HC RX MED GY IP 250 OP 250 PS 637: Performed by: STUDENT IN AN ORGANIZED HEALTH CARE EDUCATION/TRAINING PROGRAM

## 2025-06-02 PROCEDURE — G0378 HOSPITAL OBSERVATION PER HR: HCPCS

## 2025-06-02 PROCEDURE — 250N000013 HC RX MED GY IP 250 OP 250 PS 637: Performed by: HOSPITALIST

## 2025-06-02 PROCEDURE — 93005 ELECTROCARDIOGRAM TRACING: CPT

## 2025-06-02 PROCEDURE — 250N000011 HC RX IP 250 OP 636: Performed by: HOSPITALIST

## 2025-06-02 PROCEDURE — 83690 ASSAY OF LIPASE: CPT | Performed by: STUDENT IN AN ORGANIZED HEALTH CARE EDUCATION/TRAINING PROGRAM

## 2025-06-02 PROCEDURE — 83735 ASSAY OF MAGNESIUM: CPT | Performed by: STUDENT IN AN ORGANIZED HEALTH CARE EDUCATION/TRAINING PROGRAM

## 2025-06-02 PROCEDURE — 99232 SBSQ HOSP IP/OBS MODERATE 35: CPT | Performed by: STUDENT IN AN ORGANIZED HEALTH CARE EDUCATION/TRAINING PROGRAM

## 2025-06-02 PROCEDURE — 36415 COLL VENOUS BLD VENIPUNCTURE: CPT | Performed by: STUDENT IN AN ORGANIZED HEALTH CARE EDUCATION/TRAINING PROGRAM

## 2025-06-02 PROCEDURE — 258N000003 HC RX IP 258 OP 636: Performed by: HOSPITALIST

## 2025-06-02 RX ORDER — LORAZEPAM 2 MG/ML
0.5 INJECTION INTRAMUSCULAR 3 TIMES DAILY PRN
Status: DISCONTINUED | OUTPATIENT
Start: 2025-06-02 | End: 2025-06-04

## 2025-06-02 RX ORDER — POTASSIUM CHLORIDE 1500 MG/1
20 TABLET, EXTENDED RELEASE ORAL ONCE
Status: COMPLETED | OUTPATIENT
Start: 2025-06-02 | End: 2025-06-02

## 2025-06-02 RX ORDER — LORAZEPAM 0.5 MG/1
0.5 TABLET ORAL 3 TIMES DAILY PRN
Status: DISCONTINUED | OUTPATIENT
Start: 2025-06-02 | End: 2025-06-05 | Stop reason: HOSPADM

## 2025-06-02 RX ORDER — LORAZEPAM 2 MG/ML
0.5 INJECTION INTRAMUSCULAR ONCE
Status: COMPLETED | OUTPATIENT
Start: 2025-06-02 | End: 2025-06-02

## 2025-06-02 RX ORDER — POLYETHYLENE GLYCOL 3350 17 G/17G
17 POWDER, FOR SOLUTION ORAL DAILY
Status: DISCONTINUED | OUTPATIENT
Start: 2025-06-02 | End: 2025-06-05 | Stop reason: HOSPADM

## 2025-06-02 RX ORDER — MAGNESIUM HYDROXIDE/ALUMINUM HYDROXICE/SIMETHICONE 120; 1200; 1200 MG/30ML; MG/30ML; MG/30ML
15 SUSPENSION ORAL 3 TIMES DAILY PRN
Status: DISCONTINUED | OUTPATIENT
Start: 2025-06-02 | End: 2025-06-05 | Stop reason: HOSPADM

## 2025-06-02 RX ADMIN — POLYETHYLENE GLYCOL 3350 17 G: 17 POWDER, FOR SOLUTION ORAL at 13:20

## 2025-06-02 RX ADMIN — ACETAMINOPHEN 650 MG: 325 TABLET, FILM COATED ORAL at 22:33

## 2025-06-02 RX ADMIN — LORAZEPAM 0.5 MG: 2 INJECTION INTRAMUSCULAR; INTRAVENOUS at 11:25

## 2025-06-02 RX ADMIN — LORAZEPAM 0.5 MG: 2 INJECTION INTRAMUSCULAR; INTRAVENOUS at 20:23

## 2025-06-02 RX ADMIN — POTASSIUM CHLORIDE 20 MEQ: 1500 TABLET, EXTENDED RELEASE ORAL at 13:20

## 2025-06-02 RX ADMIN — ONDANSETRON 4 MG: 2 INJECTION, SOLUTION INTRAMUSCULAR; INTRAVENOUS at 03:51

## 2025-06-02 RX ADMIN — SODIUM CHLORIDE: 0.9 INJECTION, SOLUTION INTRAVENOUS at 11:56

## 2025-06-02 RX ADMIN — ALUMINUM HYDROXIDE, MAGNESIUM HYDROXIDE, AND SIMETHICONE 15 ML: 200; 200; 20 SUSPENSION ORAL at 03:50

## 2025-06-02 RX ADMIN — PANTOPRAZOLE SODIUM 40 MG: 40 INJECTION, POWDER, FOR SOLUTION INTRAVENOUS at 08:20

## 2025-06-02 ASSESSMENT — ACTIVITIES OF DAILY LIVING (ADL)
ADLS_ACUITY_SCORE: 21
ADLS_ACUITY_SCORE: 20
ADLS_ACUITY_SCORE: 21
ADLS_ACUITY_SCORE: 20
ADLS_ACUITY_SCORE: 21
ADLS_ACUITY_SCORE: 20
ADLS_ACUITY_SCORE: 21

## 2025-06-02 NOTE — ED NOTES
[facility]    ED Boarding Nurse Handoff Addendum Report:    Date/time: 6/1/2025, 10:56 PM    Activity Level: standby    Fall Risk: Yes:  assistive device/personal items within reach and activity supervised    Active Infusions: NS 75mL/hr    Current Meds Due: See MAR    Current care needs: Per POC    Oxygen requirements (liters/min and/or FiO2): none    Respiratory status: Room air    Vital signs (within last 30 minutes):    Vitals:    06/01/25 1955 06/01/25 2056 06/01/25 2100 06/01/25 2217   BP: (!) 145/82 137/85 (!) 152/100 128/60   BP Location:    Right arm   Patient Position: Standing      Pulse:  (!) 47 (!) 47 (!) 48   Resp:       Temp:    98.2  F (36.8  C)   TempSrc:    Oral   SpO2:    97%   Weight:       Height:         ED Boarding Nurse name: Tania Castillo RN

## 2025-06-02 NOTE — PROGRESS NOTES
[facility]    Medicine Progress Note - Hospitalist Service    Date of Admission:  6/1/2025    Assessment & Plan     Hector Mazariegos is a 19 year old female With past medical history of anxiety, depression presents to our hospital with nausea vomiting epigastric pain and chest pain.  Patient states she has been in significant stress planning for bridal shower along with the graduation, she is also dealing with grief as she recently lost her brother about a month ago.  She has been feeling anxious reported having episodes of panic attacks while driving and developed abdominal pain for the past 2 days associated with nausea and vomiting unable to take anything orally.  She states her abdominal pain feels like a pulling and burning at the same time radiates up to her chest and throat.  She states she did recently also eat something that was off rima drink, she states she also still uses THC and cannabis to help her sleep and last was last week.  Otherwise no other complaints.  She appears to be doing better but she says she is still not able to take things orally.  But she is been admitted under obs for further management of her nausea vomiting and abdominal pain     Nausea  Vomiting   Epigastric pain  THC Gummies use  History of anxiety and depression-untreated  Insomnia  -Etiology multifactorial could be a component of anxiety, cannabis certainly is not helping her symptoms, could be possibly gastroenteritis with intake of bad rima drink.  Symptoms mildly improved since admission.    - Patient has had multiple visits to ER for her abdominal symptoms.  In the past she had undergone extensive workup with US, CT abdomen and pelvis, even EGD with biopsies which showed chronic gastritis.  She had also been treated for H. pylori in the past.   -Lab workup fairly reassuring.  Will check lipase  - No reports of weight loss, bowel movements are usually regular but lately she thinks that she is  constipated  - Symptoms are very similar to the prior episodes and she thinks that they got triggered because of the recent stress.  Abdominal exam is reassuring.  Hold off abdominal imaging at this time.   - Symptomatic management with IV fluids, antiemetics, PPI, and GI cocktail as needed, Tylenol as needed.  Advance diet as tolerated.  Work on bowel movements, start MiraLAX 17 g daily  - Extensively counseled patient on complete cessation of THC  - Patient with documented history of anxiety and depression.  He reported recent increase in his stress related to loss of loved 1, college, and work.  She reported having panic attacks while driving.  She is also having insomnia and have been using THC to help her sleep.  Past she was on Prozac which did not work for her.  She is planning to follow-up with behavioral therapist outpatient.  Discussed with the patient to work with the behavioral therapist and also with primary care provider to find alternative medication for her depression/anxiety.  Could consider starting patient on sertraline upon discharge.  Could also consider short-term use of Z medications for insomnia upon discharge.  Patient had used melatonin in the past without any good result.      Sinus bradycardia  Prolonged Qtc  Chest pain  Reported chest pain along with her epigastric abdominal pain and nausea and vomiting.  Troponins negative.  EKG without any ST segment or T wave changes.  Low suspicion for ACS EKG did show sinus bradycardia as well as prolonged QTc.  Patient denies any family history of sudden cardiac death.  -Monitor on telemetry  - Goal K above 4, goal magnesium above 2, repeat EKG tomorrow  - Avoid QTc prolonging medications, patient was on Zofran and Compazine, would avoid using it further.  Use Ativan instead    Albuminuria  Patient noticed to have proteinuria/albuminuria on UA.  Looks like that she has it for past 2 to 3 years.  Kidney function is reassuring.  -Defer further  "evaluation outpatient to PCP          Observation Goals: -diagnostic tests and consults completed and resulted, -vital signs normal or at patient baseline, -tolerating oral intake to maintain hydration, -adequate pain control on oral analgesics, -tolerating oral antibiotics or has plans for home infusion setup, Nurse to notify provider when observation goals have been met and patient is ready for discharge.  Diet: Regular Diet Adult    DVT Prophylaxis: Ambulate every shift  Hackett Catheter: Not present  Lines: None     Cardiac Monitoring: ACTIVE order. Indication: Tachyarrhythmias, acute (48 hours)  Code Status: Full Code      Clinically Significant Risk Factors Present on Admission                             # Obesity: Estimated body mass index is 35.91 kg/m  as calculated from the following:    Height as of this encounter: 1.753 m (5' 9\").    Weight as of this encounter: 110.3 kg (243 lb 2.7 oz).              Social Drivers of Health            Disposition Plan     Medically Ready for Discharge: Anticipated Tomorrow             Isabelle Valero MD  Hospitalist Service  [facility]  Securely message with [employer] (more info)  Text page via [employer] Paging/Directory   ______________________________________________________________________    Interval History   Overnight patient complained of epigastric pain which was not relieved by Tylenol so patient used a GI cocktail.  She thinks it was somewhat helpful.  Her symptoms are slowly improving.  She is still not able to eat properly.  She was quite tearful during my encounter and shared that she has been dealing with a lot of stress lately which is related to the loss of loved 1, working as well as college.  She requested  me not to tell her mother about THC use.     Patient denies any suicidal or homicidal ideation.     4 point review of system was otherwise negative    Physical Exam   Vital Signs: Temp: 98.1  F (36.7  C) Temp src: Oral BP: (!) 141/64 " Pulse: 78   Resp: 18 SpO2: 100 % O2 Device: None (Room air)    Weight: 243 lbs 2.68 oz    Constitutional: awake, alert, cooperative, no apparent distress, and appears stated age  Eyes: Anicteric sclera  ENT: normocephalic, without obvious abnormality  Respiratory: on room air, equal air entry bilaterally, no wheezing or crackles  Cardiovascular: Normal rate, regular rhythm, no murmur  GI: Soft, nontender, nondistended  Musculoskeletal: no lower extremity pitting edema present  Neurologic:, Alert, oriented x 3, no focal deficit  Neuropsychiatric: Tearful, going through grief, appropriately engaging in conversation, good eye contact,    Medical Decision Making       45 MINUTES SPENT BY ME on the date of service doing chart review, history, exam, documentation & further activities per the note.      Data   ------------------------- PAST 24 HR DATA REVIEWED -----------------------------------------------

## 2025-06-02 NOTE — PROVIDER NOTIFICATION
Patient complained of abdominal pain 8/10 and asked for pain medication. patient stated Tylenol will not work for her. VSS.  Paged cross cover - new order. Trial GI cocktail and see if that helps

## 2025-06-02 NOTE — ED NOTES
[facility]  ED Nurse Handoff Report    ED Chief complaint: Nausea, Vomiting, & Diarrhea  . ED Diagnosis:   Final diagnoses:   Intractable nausea and vomiting   Epigastric pain       Allergies: No Known Allergies    Code Status: Full Code    Activity level - Baseline/Home:  independent.  Activity Level - Current:   independent.   Lift room needed: No.   Bariatric: No   Needed: No   Isolation: No.   Infection: Not Applicable.     Respiratory status: Room air    Vital Signs (within 30 minutes):   Vitals:    06/01/25 1948 06/01/25 1949 06/01/25 1951 06/01/25 1955   BP: (!) 141/86 (!) 141/86 (!) 142/94 (!) 145/82   Patient Position: Supine Supine Sitting Standing   Pulse:  (!) 47 53    Resp:       Temp:       SpO2:       Weight:       Height:           Cardiac Rhythm:  ,      Pain level:    Patient confused: No.   Patient Falls Risk: nonskid shoes/slippers when out of bed.   Elimination Status: Has voided     Patient Report - Initial Complaint: abdominal pain.   Focused Assessment: intractable nausea     Abnormal Results:   Labs Ordered and Resulted from Time of ED Arrival to Time of ED Departure   COMPREHENSIVE METABOLIC PANEL - Abnormal       Result Value    Sodium 138      Potassium 3.4      Carbon Dioxide (CO2) 21 (*)     Anion Gap 18 (*)     Urea Nitrogen 12.1      Creatinine 0.74      GFR Estimate >90      Calcium 8.8      Chloride 99      Glucose 91      Alkaline Phosphatase 87      AST 26      ALT 25      Protein Total 7.7      Albumin 4.5      Bilirubin Total 0.5     CBC WITH PLATELETS AND DIFFERENTIAL - Abnormal    WBC Count 7.9      RBC Count 5.08      Hemoglobin 11.1 (*)     Hematocrit 37.0      MCV 73 (*)     MCH 21.9 (*)     MCHC 30.0 (*)     RDW 17.3 (*)     Platelet Count 531 (*)     % Neutrophils 80      % Lymphocytes 17      % Monocytes 3      % Eosinophils 0      % Basophils 0      % Immature Granulocytes 0      NRBCs per 100 WBC 0      Absolute Neutrophils 6.3       Absolute Lymphocytes 1.3      Absolute Monocytes 0.2      Absolute Eosinophils 0.0      Absolute Basophils 0.0      Absolute Immature Granulocytes 0.0      Absolute NRBCs 0.0     ROUTINE UA WITH MICROSCOPIC REFLEX TO CULTURE - Abnormal    Color Urine Yellow      Appearance Urine Clear      Glucose Urine Negative      Bilirubin Urine Negative      Ketones Urine 100 (*)     Specific Gravity Urine 1.030      Blood Urine Negative      pH Urine 6.0      Protein Albumin Urine 100 (*)     Urobilinogen Urine 2.0 (*)     Nitrite Urine Negative      Leukocyte Esterase Urine Trace (*)     Mucus Urine Present (*)     RBC Urine 5 (*)     WBC Urine 4      Squamous Epithelials Urine 1     D DIMER QUANTITATIVE - Normal    D-Dimer Quantitative <0.27     TROPONIN T, HIGH SENSITIVITY - Normal    Troponin T, High Sensitivity 6     MAGNESIUM - Normal    Magnesium 1.9     TSH WITH FREE T4 REFLEX - Normal    TSH 0.39     HCG QUALITATIVE PREGNANCY - Normal    hCG Serum Qualitative Negative     NT-PROBNP - Normal    NT-proBNP 145     INFLUENZA A/B, RSV AND SARS-COV2 PCR - Normal    Influenza A PCR Negative      Influenza B PCR Negative      RSV PCR Negative      SARS CoV2 PCR Negative     TROPONIN T, HIGH SENSITIVITY        XR Chest 2 Views   Final Result   IMPRESSION: Negative chest.          Treatments provided: Labs, imaging, monitoring, medications  Family Comments: NA  OBS brochure/video discussed/provided to patient:  Yes  ED Medications:   Medications   ondansetron (ZOFRAN ODT) ODT tab 4 mg (4 mg Oral $Given 6/1/25 1426)   lactated ringers BOLUS 1,000 mL (1,000 mLs Intravenous $New Bag 6/1/25 1730)   alum & mag hydroxide-simethicone (MAALOX) suspension 15 mL (15 mLs Oral $Given 6/1/25 1729)   sucralfate (CARAFATE) suspension 1 g (1 g Oral $Given 6/1/25 1730)   ondansetron (ZOFRAN) injection 4 mg (4 mg Intravenous $Given 6/1/25 1730)   hydrOXYzine HCl (ATARAX) tablet 25 mg (25 mg Oral $Given 6/1/25 1731)   magnesium sulfate 2 g  in 50 mL sterile water intermittent infusion (2 g Intravenous $New Bag 6/1/25 1821)   LORazepam (ATIVAN) injection 1 mg (1 mg Intravenous $Given 6/1/25 2029)       Drips infusing:  No  For the majority of the shift this patient was Green.   Interventions performed were NA.    Sepsis treatment initiated: No    Cares/treatment/interventions/medications to be completed following ED care: Nausea management.     ED Nurse Name: Rosario Whittaker RN  8:50 PM  RECEIVING UNIT ED HANDOFF REVIEW    Above ED Nurse Handoff Report was reviewed: yes  Reviewed by: Sallie Up RN on June 1, 2025 at 11:02 PM   DAVID Smith called the ED to inform them the note was read: Yes

## 2025-06-02 NOTE — H&P
[facility]    History and Physical  Hospitalist       Date of Admission:  6/1/2025    Assessment and Plan:   Hector Mazariegos is a 19 year old female With past medical history of anxiety, depression presents to our hospital with nausea vomiting epigastric pain and chest pain.  Patient states she has been in significant stress planning for bridal shower along with the graduation she has been feeling anxious and developed abdominal pain for the past 2 days associated with nausea and vomiting unable to take anything orally.  She states her abdominal pain feels like a pulling and burning at the same time radiates up to her chest and throat.  She states she did recently also eat something that was off rima drink, she states she also still uses THC and cannabis to help her sleep and last was last week.  Otherwise no other complaints.  She appears to be doing better but she says she is still not able to take things orally.  But she is been admitted under ops for further management of her nausea vomiting and abdominal pain    Nausea  Vomiting   Epigastric pain  Chest pain  -Etiology multifactorial could be a component of anxiety, cannabis certainly is not helping her symptoms, could be possibly gastroenteritis with intake of bad rima drink  -ECG shows sinus bradycardia with prolonged QTc will hold off further Reglan therapy  -Repeat ECG for tomorrow morning ordered after replacement of magnesium in the ED, suspect will improve tomorrow  -Continue supportive management with Zofran, IV fluids  - Troponins negative will monitor and trend, continue telemetry  - Hold off further imaging as she has had CT abdomen pelvis in the past which were fairly normal, along with EGD in the past which was also fairly normal  - Will order PPI IV therapy    History of anxiety, depression  --Follow-up outpatient behavioral therapy           ---------     # Code status: Full  # DVT:scd  # IVF: NS at 75 ml/hour                         Medically Ready for Discharge: Anticipated Tomorrow      Carmina Muller MD  Text Page (7am - 6pm, M-F)          Primary Care Physician   Physician No Ref-Primary    Chief Complaint   Nausea, vomiting     History is obtained from the patient    History of Present Illness   Hector Mazariegos is a 19 year old female With past medical history of anxiety, depression presents to our hospital with nausea vomiting epigastric pain and chest pain.  Patient states she has been in significant stress planning for bridal shower along with the graduation she has been feeling anxious and developed abdominal pain for the past 2 days associated with nausea and vomiting unable to take anything orally.  She states her abdominal pain feels like a pulling and burning at the same time radiates up to her chest and throat.  Abdominal pain occasionally radiates around to her chest. She states she did recently also eat something that was off rima drink, she states she also still uses THC and cannabis to help her sleep and last was last week.  Otherwise no other complaints.  She appears to be doing better but she says she is still not able to take things orally.  Otherwise patient with no other complaints    Past Medical History    I have reviewed this patient's medical history and updated it with pertinent information if needed.   No past medical history on file.    Past Surgical History   I have reviewed this patient's surgical history and updated it with pertinent information if needed.  Past Surgical History:   Procedure Laterality Date    ESOPHAGOSCOPY, GASTROSCOPY, DUODENOSCOPY (EGD), COMBINED N/A 2/26/2025    Procedure: ESOPHAGOGASTRODUODENOSCOPY, WITH BIOPSY;  Surgeon: Warren Kevin MD;  Location:  GI       Prior to Admission Medications   Prior to Admission Medications   Prescriptions Last Dose Informant Patient Reported? Taking?   bismuth subsalicylate (PEPTO BISMOL) 262 MG chewable tablet   No No   Sig: Take 2  tablets (524 mg) by mouth 4 times daily (before meals and nightly).   fluconazole (DIFLUCAN) 150 MG tablet   No No   Sig: Take 1 tablet (150 mg) by mouth every 3 days.   metroNIDAZOLE (FLAGYL) 250 MG tablet   No No   Sig: Take 1 tablet (250 mg) by mouth 4 times daily.   omeprazole (PRILOSEC) 20 MG DR capsule   No No   Sig: Take 1 capsule (20 mg) by mouth 2 times daily.   ondansetron (ZOFRAN ODT) 4 MG ODT tab   No No   Sig: Take 1 tablet (4 mg) by mouth every 8 hours as needed for nausea.   sucralfate (CARAFATE) 1 GM/10ML suspension   No No   Sig: Take 10 mLs (1 g) by mouth 4 times daily.   tetracycline (ACHROMYCIN/SUMYCIN) 500 MG capsule   No No   Sig: Take 1 capsule (500 mg) by mouth 4 times daily.      Facility-Administered Medications: None     Allergies   No Known Allergies    Social History   I have reviewed this patient's social history and updated it with pertinent information if needed. Hector Mazariegos  reports that she has never smoked. She has never been exposed to tobacco smoke. She has never used smokeless tobacco. She reports current drug use. Drug: Marijuana. She reports that she does not drink alcohol.    Family History   Family history reviewed with patient and is noncontributory.    ROS  12 point review system discussed with patient negative as per HPI    Physical Exam   Temp: 98.2  F (36.8  C)   BP: (!) 145/82 Pulse: 53   Resp: 22 SpO2: 100 % O2 Device: None (Room air)    Vital Signs with Ranges  Temp:  [98.2  F (36.8  C)] 98.2  F (36.8  C)  Pulse:  [44-56] 53  Resp:  [20-22] 22  BP: (128-164)/() 145/82  SpO2:  [100 %] 100 %  247 lbs 5.7 oz    General: Pt in NAD, normal appearance  HEENT: PERRLA, EOMI, normocephalic / atraumatic no cervical LAD, no bruit, no pallor, WNL oropharynx, neck supple  Cardiac: +S1, S2, RRR, no MRG, no edema  Lungs: Clear to Auscultation Bilateral, normal breathing without accessory muscle usage, no wheezing, rhonchi or crackles  GI: soft NT/ND +bowel sounds all  quadrants, no hepatosplenomegaly  Psych: normal mood and affect, A&Ox3  Neurological: A&O x3, nonfocal   skin: warm, dry, normal turgor, no rash    Data   Data reviewed today:  I personally reviewed the EKG tracing showing sinus bradycardia with prolonged QTc.  Recent Labs   Lab 06/01/25  1733 06/01/25  1721   WBC  --  7.9   HGB  --  11.1*   MCV  --  73*   PLT  --  531*     --    POTASSIUM 3.4  --    CHLORIDE 99  --    CO2 21*  --    BUN 12.1  --    CR 0.74  --    ANIONGAP 18*  --    DANIELLA 8.8  --    GLC 91  --    ALBUMIN 4.5  --    PROTTOTAL 7.7  --    BILITOTAL 0.5  --    ALKPHOS 87  --    ALT 25  --    AST 26  --        Recent Results (from the past 24 hours)   XR Chest 2 Views    Narrative    EXAM: XR CHEST 2 VIEWS  LOCATION: [facility]  DATE: 6/1/2025    INDICATION: syncope, heart burn chest pain  COMPARISON: PA and lateral views of the chest 5/31/2024      Impression    IMPRESSION: Negative chest.

## 2025-06-02 NOTE — PLAN OF CARE
Goal Outcome Evaluation:               PRIMARY DIAGNOSIS: Neasea/ Vomating and Abdominal pain   OUTPATIENT/OBSERVATION GOALS TO BE MET BEFORE DISCHARGE:  ADLs back to baseline: No    Activity and level of assistance: Up with standby assistance.    Pain status: Improved-controlled with oral pain medications.    Return to near baseline physical activity: No     Discharge Planner Nurse   Safe discharge environment identified: No  Barriers to discharge: Yes       Entered by: Sallie Up RN 06/02/2025 4:48 AM    Vitals are Temp: 97.7  F (36.5  C) Temp src: Oral BP: 129/88 Pulse: 53   Resp: 19 SpO2: 100 %.  Patient alert and oriented x4,VSS.RA. c/o abdominal pain.and Nausea Zofran x 1 given with stated relief.pt reporting vomiting x 1 during shift. normal saline 0.9% running at 75 mL per hour. Pt is a regular diet. SBA with no assistive devices. The plan is to continue supportive management with Zofran and IV fluids. Monitoring on tele. Repeat ECG for tomorrow morning.     Please review provider order for any additional goals.   Nurse to notify provider when observation goals have been met and patient is ready for discharge.

## 2025-06-02 NOTE — PLAN OF CARE
PRIMARY DIAGNOSIS: Abdominal Pain    OUTPATIENT/OBSERVATION GOALS TO BE MET BEFORE DISCHARGE  1. Orthostatic performed: N/A    2. Tolerating PO fluid and/or antibiotics (if applicable): Yes    3. Nausea/Vomiting/Diarrhea symptoms improved: Yes    4. Pain status: Improved with use of alternative comfort measures i.e.: heat, distraction    5. Return to near baseline physical activity: Yes    Discharge Planner Nurse   Safe discharge environment identified: Yes  Barriers to discharge: No       Entered by: Estelita Newton RN 06/02/2025 1:43 PM     Please review provider order for any additional goals.   Nurse to notify provider when observation goals have been met and patient is ready for discharge.

## 2025-06-02 NOTE — PHARMACY-ADMISSION MEDICATION HISTORY
Pharmacist Admission Medication History    Admission medication history is complete. The information provided in this note is only as accurate as the sources available at the time of the update.    Information Source(s): Patient via in-person    Changes made to PTA medication list:  Deleted: BSS, fluconazole, Flagyl, omeprazole, ondansetron, sucralfate, tetracycline    Allergies reviewed with patient and updates made in EHR: yes    Medication History Completed By: Jonelle Neri PharmD 6/2/2025 10:09 AM    No outpatient medications have been marked as taking for the 6/1/25 encounter (Hospital Encounter).

## 2025-06-02 NOTE — PLAN OF CARE
Goal Outcome Evaluation:               PRIMARY DIAGNOSIS: Neasea/ Vomating and Abdominal pain   OUTPATIENT/OBSERVATION GOALS TO BE MET BEFORE DISCHARGE:  ADLs back to baseline: No    Activity and level of assistance: Up with standby assistance.    Pain status: Improved-controlled with oral pain medications.    Return to near baseline physical activity: No     Discharge Planner Nurse   Safe discharge environment identified: No  Barriers to discharge: Yes       Entered by: Sallie Up RN 06/02/2025 4:48 AM    Vitals are Temp: 97.7  F (36.5  C) Temp src: Oral BP: 129/88 Pulse: 53   Resp: 19 SpO2: 100 %.  Patient is alert and oriented x4, complaining of pain in abdomen. Patient has normal saline 0.9% running at 75 mL per hour. Pt is a regular diet. SBA with no assistive devices. The plan is to continue supportive management with Zofran and IV fluids. Monitoring on tele. Repeat ECG for tomorrow morning.     Please review provider order for any additional goals.   Nurse to notify provider when observation goals have been met and patient is ready for discharge.

## 2025-06-02 NOTE — PROGRESS NOTES
Cross Cover    Called for ongoing epigastric pain - pt reports apap doesn't work for her.  Trial GI cocktail and see if that helps

## 2025-06-02 NOTE — PLAN OF CARE
A&Ox4. Intermittent c/o upper abdominal discomfort after eating. Able to tolerate applesauce, apple juice and water without vomiting. Repeat EKG completed, provider aware. Will continue to monitor.    PRIMARY DIAGNOSIS: Abdominal Discomfort    OUTPATIENT/OBSERVATION GOALS TO BE MET BEFORE DISCHARGE  1. Orthostatic performed: N/A    2. Tolerating PO fluid and/or antibiotics (if applicable): Yes    3. Nausea/Vomiting/Diarrhea symptoms improved: Yes    4. Pain status: Improved with use of alternative comfort measures i.e.: heat    5. Return to near baseline physical activity: Yes    Discharge Planner Nurse   Safe discharge environment identified: Yes  Barriers to discharge: No       Entered by: Estelita Newton RN 06/02/2025 9:47 AM     Please review provider order for any additional goals.   Nurse to notify provider when observation goals have been met and patient is ready for discharge.

## 2025-06-02 NOTE — PLAN OF CARE
"A&Ox4. Intermittent c/o nausea, 1 time dose of ativan given for nausea with good relief. 1 episode of vomiting after eating, no further complaints. On TELE, SB per [employer] tech. Will continue to monitor.     Goal Outcome Evaluation:      Plan of Care Reviewed With: patient    Outcome Evaluation: A&Ox4. Intermittent c/o nausea, 1 time dose of ativan given for nausea with good relief. 1 episode of vomiting after eating, no further complaints. On TELE, SB per [employer] tech. Will continue to monitor.          Problem: Adult Inpatient Plan of Care  Goal: Plan of Care Review  Description: The Plan of Care Review/Shift note should be completed every shift.  The Outcome Evaluation is a brief statement about your assessment that the patient is improving, declining, or no change.  This information will be displayed automatically on your shift  note.  Outcome: Progressing  Flowsheets (Taken 6/2/2025 0038)  Outcome Evaluation: A&Ox4. Intermittent c/o nausea, 1 time dose of ativan given for nausea with good relief. 1 episode of vomiting after eating, no further complaints. On TELE, SB per tele tech. Will continue to monitor.  Plan of Care Reviewed With: patient  Goal: Patient-Specific Goal (Individualized)  Description: You can add care plan individualizations to a care plan. Examples of Individualization might be:  \"Parent requests to be called daily at 9am for status\", \"I have a hard time hearing out of my right ear\", or \"Do not touch me to wake me up as it startles  me\".  Outcome: Progressing  Goal: Absence of Hospital-Acquired Illness or Injury  Outcome: Progressing  Intervention: Prevent and Manage VTE (Venous Thromboembolism) Risk  Recent Flowsheet Documentation  Taken 6/2/2025 0851 by Estelita Newton RN  VTE Prevention/Management: SCDs off (sequential compression devices)  Intervention: Prevent Infection  Recent Flowsheet Documentation  Taken 6/2/2025 1533 by Estelita Newton RN  Infection Prevention: cohorting utilized  Goal: " Optimal Comfort and Wellbeing  Outcome: Progressing  Goal: Readiness for Transition of Care  Outcome: Progressing     Problem: Skin Injury Risk Increased  Goal: Skin Health and Integrity  Outcome: Progressing

## 2025-06-02 NOTE — PLAN OF CARE
ROOM # 251-2    Living Situation declined to answer  Facility name:  : declined     Activity level at baseline:Indep  Activity level on admit: SBA    Who will be transporting you at discharge: declined to answer.    patient registered to observation; given Patient Bill of Rights; given the opportunity to ask questions about observation status and their plan of care.  Patient has been oriented to the observation room, bathroom and call light is in place.    Discussed discharge goals and expectations with patient/family.         Goal Outcome Evaluation:

## 2025-06-03 LAB
HOLD SPECIMEN: NORMAL
POTASSIUM SERPL-SCNC: 3.3 MMOL/L (ref 3.4–5.3)

## 2025-06-03 PROCEDURE — 250N000013 HC RX MED GY IP 250 OP 250 PS 637: Performed by: INTERNAL MEDICINE

## 2025-06-03 PROCEDURE — 84132 ASSAY OF SERUM POTASSIUM: CPT | Performed by: STUDENT IN AN ORGANIZED HEALTH CARE EDUCATION/TRAINING PROGRAM

## 2025-06-03 PROCEDURE — 96376 TX/PRO/DX INJ SAME DRUG ADON: CPT

## 2025-06-03 PROCEDURE — 36415 COLL VENOUS BLD VENIPUNCTURE: CPT | Performed by: INTERNAL MEDICINE

## 2025-06-03 PROCEDURE — 84132 ASSAY OF SERUM POTASSIUM: CPT | Performed by: INTERNAL MEDICINE

## 2025-06-03 PROCEDURE — 250N000011 HC RX IP 250 OP 636: Performed by: STUDENT IN AN ORGANIZED HEALTH CARE EDUCATION/TRAINING PROGRAM

## 2025-06-03 PROCEDURE — 36415 COLL VENOUS BLD VENIPUNCTURE: CPT | Performed by: STUDENT IN AN ORGANIZED HEALTH CARE EDUCATION/TRAINING PROGRAM

## 2025-06-03 PROCEDURE — 250N000013 HC RX MED GY IP 250 OP 250 PS 637: Performed by: HOSPITALIST

## 2025-06-03 PROCEDURE — 250N000011 HC RX IP 250 OP 636: Performed by: HOSPITALIST

## 2025-06-03 PROCEDURE — 120N000004 HC R&B MS OVERFLOW

## 2025-06-03 PROCEDURE — 258N000003 HC RX IP 258 OP 636: Performed by: HOSPITALIST

## 2025-06-03 PROCEDURE — 250N000013 HC RX MED GY IP 250 OP 250 PS 637: Performed by: STUDENT IN AN ORGANIZED HEALTH CARE EDUCATION/TRAINING PROGRAM

## 2025-06-03 PROCEDURE — G0378 HOSPITAL OBSERVATION PER HR: HCPCS

## 2025-06-03 RX ORDER — POTASSIUM CHLORIDE 1500 MG/1
40 TABLET, EXTENDED RELEASE ORAL ONCE
Status: COMPLETED | OUTPATIENT
Start: 2025-06-03 | End: 2025-06-03

## 2025-06-03 RX ADMIN — POTASSIUM CHLORIDE 40 MEQ: 1500 TABLET, EXTENDED RELEASE ORAL at 19:33

## 2025-06-03 RX ADMIN — LORAZEPAM 0.5 MG: 0.5 TABLET ORAL at 12:18

## 2025-06-03 RX ADMIN — POLYETHYLENE GLYCOL 3350 17 G: 17 POWDER, FOR SOLUTION ORAL at 10:00

## 2025-06-03 RX ADMIN — SODIUM CHLORIDE: 0.9 INJECTION, SOLUTION INTRAVENOUS at 00:31

## 2025-06-03 RX ADMIN — PANTOPRAZOLE SODIUM 40 MG: 40 INJECTION, POWDER, FOR SOLUTION INTRAVENOUS at 10:00

## 2025-06-03 RX ADMIN — LORAZEPAM 0.5 MG: 0.5 TABLET ORAL at 20:26

## 2025-06-03 RX ADMIN — LORAZEPAM 0.5 MG: 2 INJECTION INTRAMUSCULAR; INTRAVENOUS at 06:01

## 2025-06-03 RX ADMIN — ACETAMINOPHEN 650 MG: 325 TABLET, FILM COATED ORAL at 23:29

## 2025-06-03 ASSESSMENT — ACTIVITIES OF DAILY LIVING (ADL)
ADLS_ACUITY_SCORE: 20

## 2025-06-03 NOTE — PLAN OF CARE
"A&Ox4. No c/o pain or discomfort. Contunues to feel nausea and has had multiple episodes of vomiting despite intervention. Eating small bites of food and sips of water, unable to keep it down. On IVF. On TELE, episodes of sinus lori per TELE tech. Plan for discharge tomorrow. Will continue with plan of care.     Goal Outcome Evaluation:      Plan of Care Reviewed With: patient    Overall Patient Progress: no changeOverall Patient Progress: no change    Outcome Evaluation: A&Ox4. No c/o pain or discomfort. Contunues to feel nausea and has had multiple episodes of vomiting despite intervention. Eating small bites of food and sips of water, unable to keep it down. Plan for discharge tomorrow. Will continue with plan of care.        Problem: Adult Inpatient Plan of Care  Goal: Plan of Care Review  Description: The Plan of Care Review/Shift note should be completed every shift.  The Outcome Evaluation is a brief statement about your assessment that the patient is improving, declining, or no change.  This information will be displayed automatically on your shift  note.  Outcome: Not Progressing  Flowsheets (Taken 6/3/2025 1726)  Outcome Evaluation: A&Ox4. No c/o pain or discomfort. Contunues to feel nausea and has had multiple episodes of vomiting despite intervention. Eating small bites of food and sips of water, unable to keep it down. Plan for discharge tomorrow. Will continue with plan of care.  Plan of Care Reviewed With: patient  Overall Patient Progress: no change  Goal: Patient-Specific Goal (Individualized)  Description: You can add care plan individualizations to a care plan. Examples of Individualization might be:  \"Parent requests to be called daily at 9am for status\", \"I have a hard time hearing out of my right ear\", or \"Do not touch me to wake me up as it startles  me\".  Outcome: Not Progressing  Goal: Absence of Hospital-Acquired Illness or Injury  Outcome: Not Progressing  Intervention: Identify and " Manage Fall Risk  Recent Flowsheet Documentation  Taken 6/3/2025 0930 by Estelita Newton RN  Safety Promotion/Fall Prevention: safety round/check completed  Goal: Optimal Comfort and Wellbeing  Outcome: Not Progressing  Goal: Readiness for Transition of Care  Outcome: Not Progressing     Problem: Skin Injury Risk Increased  Goal: Skin Health and Integrity  Outcome: Not Progressing  Intervention: Plan: Nurse Driven Intervention: Moisture Management  Recent Flowsheet Documentation  Taken 6/3/2025 0930 by Estelita Newton, RN  Moisture Interventions: No brief in bed

## 2025-06-03 NOTE — PROGRESS NOTES
Hospitalist Medicine Progress Note   [facility]       Hector Mazariegos is a 19-year-old young lady with history of anxiety, depression with significant recent stress for bridal shower, graduation, dealing with grief with her brother's death a month ago with episodes of panic attack while driving, abdominal pain 2 days prior to admission associated with nausea vomiting, epigastric and chest discomfort with use of THC Gummies and cannabis to help her sleep.  Was unable to keep things down was admitted 6/1/2025 to [facility] [place].  Chest x-ray looked unremarkable and was treated with IV fluids, antinausea medications carefully with QT prolongation, IV Protonix therapy.  She is planning to have behavioral therapy as outpatient.  She had used melatonin in the past for sleep without good results and states that Prozac did not work for her.  She has proteinuria which needs to be followed as outpatient though at this time patient's creatinine and kidney function are reassuring patient continued to vomit and having nausea       Date of Admission:  6/1/2025  Assessment & Plan     Nausea  Vomiting   Epigastric pain  THC Gummies use  History of anxiety and depression-untreated  Insomnia  -Etiology multifactorial could be a component of anxiety, cannabis certainly is not helping her symptoms, could be possibly gastroenteritis with intake of bad rima drink.  Symptoms mildly improved since admission.    - Patient has had multiple visits to ER for her abdominal symptoms.  In the past she had undergone extensive workup with US, CT abdomen and pelvis, even EGD with biopsies which showed chronic gastritis.  She had also been treated for H. pylori in the past.   -Lab workup fairly reassuring.  Will check lipase  - No reports of weight loss, bowel movements are usually regular but lately she thinks that she is constipated  - Symptoms are very similar to the prior episodes and she thinks that they got  triggered because of the recent stress.  Abdominal exam is reassuring.  Hold off abdominal imaging at this time.   - Symptomatic management with IV fluids, antiemetics, PPI, and GI cocktail as needed, Tylenol as needed.  Advance diet as tolerated.  Work on bowel movements, start MiraLAX 17 g daily  - Extensively counseled patient on complete cessation of THC  - Patient with documented history of anxiety and depression.  He reported recent increase in his stress related to loss of loved 1, college, and work.  She reported having panic attacks while driving.  She is also having insomnia and have been using THC to help her sleep.  Past she was on Prozac which did not work for her.  She is planning to follow-up with behavioral therapist outpatient.  Discussed with the patient to work with the behavioral therapist and also with primary care provider to find alternative medication for her depression/anxiety.  Could consider starting patient on sertraline upon discharge.    She has agreed to taking trazodone for insomnia and anxiety.        Sinus bradycardia  Prolonged Qtc  Chest pain  Reported chest pain along with her epigastric abdominal pain and nausea and vomiting.  Troponins negative.  EKG without any ST segment or T wave changes.  Low suspicion for ACS EKG did show sinus bradycardia as well as prolonged QTc.  Patient denies any family history of sudden cardiac death.  -Monitor on telemetry  - Goal K above 4, goal magnesium above 2, repeat EKG tomorrow  - Avoid QTc prolonging medications, patient was on Zofran and Compazine, would avoid using it further.  Use Ativan instead     Albuminuria  Patient noticed to have proteinuria/albuminuria on UA.  Looks like that she has it for past 2 to 3 years.  Kidney function is reassuring.  -Defer further evaluation outpatient to PCP            Plan:   Trazodone 50 mg daily  IV fluids  Patient continues to have nausea therefore we will continue to keep her in the  "hospital  Inpatient admission  Will do a CT scan of the abdomen if she continues to vomit tomorrow    Diet: Regular Diet Adult    DVT Prophylaxis: Low Risk/Ambulatory with no VTE prophylaxis indicated  Hackett Catheter: Not present  Code Status: Full Code         Medically Ready for Discharge: Anticipated Tomorrow    Clinically Significant Risk Factors Present on Admission        # Hypokalemia: Lowest K = 3.3 mmol/L in last 2 days, will replace as needed                      # Obesity: Estimated body mass index is 35.91 kg/m  as calculated from the following:    Height as of this encounter: 1.753 m (5' 9\").    Weight as of this encounter: 110.3 kg (243 lb 2.7 oz).                    The patient's care was discussed with the Patient and RN  Patient's mother wanted me to tell what is wrong with the patient but on taking patient's permission she wanted me not to so I did not tell the mother the cause of her hospitalization.    Negrito Valero MD  Hospitalist Service  [facility]    ______________________________________________________________________    Interval History     Symptoms   Patient continues to vomit and have nausea    Review of Systems:   Denies any abdominal pain    Data reviewed today: I reviewed all medications, new labs and imaging results over the last 24 hours.     Physical Exam   Vital Signs: Temp: 97.9  F (36.6  C) Temp src: Oral BP: (!) 129/93 Pulse: 50   Resp: 18 SpO2: 99 % O2 Device: None (Room air)    Weight: 243 lbs 2.68 oz      GENERAL: Patient is not in acute distress  HEENT: EOM+,Conjunctiva is clear   NECK:  no Jugular Venous distention  HEART: S1 S2 regular Rate and Rhythm, there is  no murmur,   LUNGS: Respirations are  not laboured, Lungs are  clear to auscultation without Crepitations or Wheezing   ABDOMEN: Soft , there is no tenderness , Bowel Sounds are  Positive   LOWER LIMBS: no Pedal Edema  Bilaterally   CNS:  Alert,  Oriented x 3, Moving all the Four Limbs "     Data   Recent Labs   Lab 06/03/25  0629 06/02/25  1125 06/01/25  1733 06/01/25  1721   WBC  --   --   --  7.9   HGB  --   --   --  11.1*   MCV  --   --   --  73*   PLT  --   --   --  531*   NA  --   --  138  --    POTASSIUM 3.3* 3.5 3.4  --    CHLORIDE  --   --  99  --    CO2  --   --  21*  --    BUN  --   --  12.1  --    CR  --   --  0.74  --    ANIONGAP  --   --  18*  --    DANIELLA  --   --  8.8  --    GLC  --   --  91  --    ALBUMIN  --   --  4.5  --    PROTTOTAL  --   --  7.7  --    BILITOTAL  --   --  0.5  --    ALKPHOS  --   --  87  --    ALT  --   --  25  --    AST  --   --  26  --    LIPASE  --  26  --   --          No results found for this or any previous visit (from the past 24 hours).

## 2025-06-03 NOTE — PLAN OF CARE
PRIMARY DIAGNOSIS: NAUSEA, VOMITING, EPIGASTRIC PAIN   OUTPATIENT/OBSERVATION GOALS TO BE MET BEFORE DISCHARGE:  ADLs back to baseline: Yes    Activity and level of assistance: Ambulating independently.    Pain status: Improved-controlled with oral pain medications.    Return to near baseline physical activity: Yes     Discharge Planner Nurse   Safe discharge environment identified: Yes  Barriers to discharge: Yes       Entered by: Shanti Magallanes RN 06/03/2025 4:32 AM   Patient alert and oriented. Vitally stable on room air. PRN Tylenol given x1 for abdominal pain with improvement. Patient vomited x 2 overnight after having soup and Gatorade- PRN Ativan given. Cardiac telemetry in place-sinus lori overnight per telemetry tech. IV fluids infusing per orders. Up independently in room. Patient stated unable to void all day at the start of shift,  bladder scanned for 25 mL- denied pain/discomfort. Patient was able to void without difficulty later in the night. Denies shortness of breath/trouble breathing. Calls appropriately     Please review provider order for any additional goals.   Nurse to notify provider when observation goals have been met and patient is ready for discharge.      Goal Outcome Evaluation:      Plan of Care Reviewed With: patient    Overall Patient Progress: improvingOverall Patient Progress: improving    Outcome Evaluation: Patient alert and orietned. vitally stable on room. PRN Ativan for nausea.      Problem: Adult Inpatient Plan of Care  Goal: Plan of Care Review  Description: The Plan of Care Review/Shift note should be completed every shift.  The Outcome Evaluation is a brief statement about your assessment that the patient is improving, declining, or no change.  This information will be displayed automatically on your shift  note.  6/3/2025 0254 by Shanti Magallanes RN  Outcome: Progressing  Flowsheets (Taken 6/3/2025 0254)  Outcome Evaluation: Patient alert and orietned. vitally stable on room.  "PRN Ativan for nausea.  Plan of Care Reviewed With: patient  Overall Patient Progress: improving  6/2/2025 2059 by Shanti Magallanes RN  Outcome: Progressing  Flowsheets (Taken 6/2/2025 2059)  Outcome Evaluation: Patient alert and oriented. vitally stable on room air. Nausea managed with PRN Ativan.  Plan of Care Reviewed With: patient  Overall Patient Progress: improving  Goal: Patient-Specific Goal (Individualized)  Description: You can add care plan individualizations to a care plan. Examples of Individualization might be:  \"Parent requests to be called daily at 9am for status\", \"I have a hard time hearing out of my right ear\", or \"Do not touch me to wake me up as it startles  me\".  6/3/2025 0254 by Shanti Magallanes RN  Outcome: Progressing  6/2/2025 2059 by Shanti Magallanes RN  Outcome: Progressing  Goal: Absence of Hospital-Acquired Illness or Injury  6/3/2025 0254 by Shanti Magallanes RN  Outcome: Progressing  6/2/2025 2059 by Shanti Magallanes RN  Outcome: Progressing  Intervention: Identify and Manage Fall Risk  Recent Flowsheet Documentation  Taken 6/2/2025 2000 by Shanti Magallanes RN  Safety Promotion/Fall Prevention:   nonskid shoes/slippers when out of bed   patient and family education  Intervention: Prevent Skin Injury  Recent Flowsheet Documentation  Taken 6/2/2025 2000 by Shanti Magallanes RN  Body Position: position changed independently  Intervention: Prevent and Manage VTE (Venous Thromboembolism) Risk  Recent Flowsheet Documentation  Taken 6/2/2025 2000 by Shanti Magallanes RN  VTE Prevention/Management: SCDs off (sequential compression devices)  Intervention: Prevent Infection  Recent Flowsheet Documentation  Taken 6/2/2025 2000 by Shanti Magallanes RN  Infection Prevention:   hand hygiene promoted   rest/sleep promoted  Goal: Optimal Comfort and Wellbeing  6/3/2025 0254 by Shanti Magallanes RN  Outcome: Progressing  6/2/2025 2059 by Shanti Magallanes RN  Outcome: Progressing  Goal: Readiness for Transition of Care  6/3/2025 0254 by " Shanti Magallanes, RN  Outcome: Progressing  6/2/2025 2059 by Shanti Magallanes, RN  Outcome: Progressing     Problem: Skin Injury Risk Increased  Goal: Skin Health and Integrity  6/3/2025 0254 by Shanti Magallanes RN  Outcome: Progressing  6/2/2025 2059 by Shanti Magallanes, RN  Outcome: Progressing  Intervention: Optimize Skin Protection  Recent Flowsheet Documentation  Taken 6/2/2025 2000 by Shanti Magallanes, RN  Activity Management: up ad mable  Head of Bed (HOB) Positioning: HOB at 20-30 degrees

## 2025-06-03 NOTE — PLAN OF CARE
"PRIMARY DIAGNOSIS:  NAUSEA, VOMITING, EPIGASTRIC PAIN   OUTPATIENT/OBSERVATION GOALS TO BE MET BEFORE DISCHARGE:  ADLs back to baseline: Yes    Activity and level of assistance: Ambulating independently.    Pain status: Improved-controlled with oral pain medications.    Return to near baseline physical activity: Yes     Discharge Planner Nurse   Safe discharge environment identified: Yes  Barriers to discharge: Yes       Entered by: Shanti Magallanes RN 06/02/2025 11:00 PM   Patient alert and oriented. Vitally stable on room air. PRN Tylenol given x1 for abdominal pain. Patient reports one episode of vomiting this evening after drinking a smoothie. IV fluids infusing per orders. Up independently in room. Denies shortness of breath/trouble breathing. Calls appropriately.     Please review provider order for any additional goals.   Nurse to notify provider when observation goals have been met and patient is ready for discharge.      Goal Outcome Evaluation:      Plan of Care Reviewed With: patient    Overall Patient Progress: improvingOverall Patient Progress: improving    Outcome Evaluation: Patient alert and oriented. vitally stable on room air. Nausea managed with PRN Ativan.      Problem: Adult Inpatient Plan of Care  Goal: Plan of Care Review  Description: The Plan of Care Review/Shift note should be completed every shift.  The Outcome Evaluation is a brief statement about your assessment that the patient is improving, declining, or no change.  This information will be displayed automatically on your shift  note.  Outcome: Progressing  Flowsheets (Taken 6/2/2025 2059)  Outcome Evaluation: Patient alert and oriented. vitally stable on room air. Nausea managed with PRN Ativan.  Plan of Care Reviewed With: patient  Overall Patient Progress: improving  Goal: Patient-Specific Goal (Individualized)  Description: You can add care plan individualizations to a care plan. Examples of Individualization might be:  \"Parent " "requests to be called daily at 9am for status\", \"I have a hard time hearing out of my right ear\", or \"Do not touch me to wake me up as it startles  me\".  Outcome: Progressing  Goal: Absence of Hospital-Acquired Illness or Injury  Outcome: Progressing  Intervention: Identify and Manage Fall Risk  Recent Flowsheet Documentation  Taken 6/2/2025 2000 by Shanti Magallanes RN  Safety Promotion/Fall Prevention:   nonskid shoes/slippers when out of bed   patient and family education  Intervention: Prevent Skin Injury  Recent Flowsheet Documentation  Taken 6/2/2025 2000 by Shanti Magallanes RN  Body Position: position changed independently  Intervention: Prevent and Manage VTE (Venous Thromboembolism) Risk  Recent Flowsheet Documentation  Taken 6/2/2025 2000 by Shanti Magallanes RN  VTE Prevention/Management: SCDs off (sequential compression devices)  Intervention: Prevent Infection  Recent Flowsheet Documentation  Taken 6/2/2025 2000 by Shanti Magallanes RN  Infection Prevention:   hand hygiene promoted   rest/sleep promoted  Goal: Optimal Comfort and Wellbeing  Outcome: Progressing  Goal: Readiness for Transition of Care  Outcome: Progressing     Problem: Skin Injury Risk Increased  Goal: Skin Health and Integrity  Outcome: Progressing  Intervention: Optimize Skin Protection  Recent Flowsheet Documentation  Taken 6/2/2025 2000 by Shanti Magallanes RN  Activity Management: up ad mable  Head of Bed (HOB) Positioning: HOB at 20-30 degrees         "

## 2025-06-03 NOTE — PLAN OF CARE
A&Ox4. No c/o pain. Continues to vomit despite intervention. 1 dose of ativan given. Discharge pending if pt is able to keep PO fluids down.    PRIMARY DIAGNOSIS: Abdominal Pain, nausea, vomiting    OUTPATIENT/OBSERVATION GOALS TO BE MET BEFORE DISCHARGE  1. Orthostatic performed: N/A    2. Tolerating PO fluid and/or antibiotics (if applicable): No    3. Nausea/Vomiting/Diarrhea symptoms improved: No, continues to vomit despite intervention.    4. Pain status: Pain free.    5. Return to near baseline physical activity: Yes    Discharge Planner Nurse   Safe discharge environment identified: Yes  Barriers to discharge: No       Entered by: Estelita Newton RN 06/03/2025 5:21 PM     Please review provider order for any additional goals.   Nurse to notify provider when observation goals have been met and patient is ready for discharge.

## 2025-06-03 NOTE — PLAN OF CARE
PRIMARY DIAGNOSIS:  NAUSEA, VOMITING, EPIGASTRIC PAIN   OUTPATIENT/OBSERVATION GOALS TO BE MET BEFORE DISCHARGE:  ADLs back to baseline: Yes    Activity and level of assistance: Ambulating independently.    Pain status: Improved-controlled with oral pain medications.    Return to near baseline physical activity: Yes     Discharge Planner Nurse   Safe discharge environment identified: Yes  Barriers to discharge: Yes       Entered by: Shanti Magallanes RN 06/02/2025 9:00 PM   Patient alert and oriented. Vitally stable on room air. PRN Ativan given x1 for nausea per patient request. Patient tolerating clear liquid diet- states had solids earlier in the day but ended up vomiting. IV fluids infusing per orders. Patient reports has not voided today- denies pain/discomfort. Bladder scan showed only 25 mL. Reports one bowel movement today. Denies shortness of breath/trouble breathing. Calls appropriately.     Please review provider order for any additional goals.   Nurse to notify provider when observation goals have been met and patient is ready for discharge.      Goal Outcome Evaluation:      Plan of Care Reviewed With: patient    Overall Patient Progress: improvingOverall Patient Progress: improving    Outcome Evaluation: Patient alert and oriented. vitally stable on room air. Nausea managed with PRN Ativan.      Problem: Adult Inpatient Plan of Care  Goal: Plan of Care Review  Description: The Plan of Care Review/Shift note should be completed every shift.  The Outcome Evaluation is a brief statement about your assessment that the patient is improving, declining, or no change.  This information will be displayed automatically on your shift  note.  Outcome: Progressing  Flowsheets (Taken 6/2/2025 2059)  Outcome Evaluation: Patient alert and oriented. vitally stable on room air. Nausea managed with PRN Ativan.  Plan of Care Reviewed With: patient  Overall Patient Progress: improving  Goal: Patient-Specific Goal  "(Individualized)  Description: You can add care plan individualizations to a care plan. Examples of Individualization might be:  \"Parent requests to be called daily at 9am for status\", \"I have a hard time hearing out of my right ear\", or \"Do not touch me to wake me up as it startles  me\".  Outcome: Progressing  Goal: Absence of Hospital-Acquired Illness or Injury  Outcome: Progressing  Intervention: Identify and Manage Fall Risk  Recent Flowsheet Documentation  Taken 6/2/2025 2000 by Shanti Magallanes RN  Safety Promotion/Fall Prevention:   nonskid shoes/slippers when out of bed   patient and family education  Intervention: Prevent Skin Injury  Recent Flowsheet Documentation  Taken 6/2/2025 2000 by Shanti Magallanes RN  Body Position: position changed independently  Intervention: Prevent and Manage VTE (Venous Thromboembolism) Risk  Recent Flowsheet Documentation  Taken 6/2/2025 2000 by Shanti Magallanes RN  VTE Prevention/Management: SCDs off (sequential compression devices)  Intervention: Prevent Infection  Recent Flowsheet Documentation  Taken 6/2/2025 2000 by Shanti Magallanes RN  Infection Prevention:   hand hygiene promoted   rest/sleep promoted  Goal: Optimal Comfort and Wellbeing  Outcome: Progressing  Goal: Readiness for Transition of Care  Outcome: Progressing     Problem: Skin Injury Risk Increased  Goal: Skin Health and Integrity  Outcome: Progressing  Intervention: Optimize Skin Protection  Recent Flowsheet Documentation  Taken 6/2/2025 2000 by Shanti Magallanes RN  Activity Management: up ad mable  Head of Bed (HOB) Positioning: HOB at 20-30 degrees         "

## 2025-06-03 NOTE — PLAN OF CARE
A&Ox4. No c/o pain or discomfort. Intermittent nausea noted, no emesis today. Will plan to discharge this afternoon if able to tolerate meals. Continue with plan of care.    PRIMARY DIAGNOSIS: GASTROENTERITIS    OUTPATIENT/OBSERVATION GOALS TO BE MET BEFORE DISCHARGE  1. Orthostatic performed: N/A    2. Tolerating PO fluid and/or antibiotics (if applicable): Yes    3. Nausea/Vomiting/Diarrhea symptoms improved: Yes    4. Pain status: Pain free.    5. Return to near baseline physical activity: Yes    Discharge Planner Nurse   Safe discharge environment identified: Yes  Barriers to discharge: No       Entered by: Estelita Newton RN 06/03/2025 10:27 AM     Please review provider order for any additional goals.   Nurse to notify provider when observation goals have been met and patient is ready for discharge.

## 2025-06-04 ENCOUNTER — APPOINTMENT (OUTPATIENT)
Dept: CT IMAGING | Facility: CLINIC | Age: 20
End: 2025-06-04
Attending: INTERNAL MEDICINE
Payer: COMMERCIAL

## 2025-06-04 LAB
ALBUMIN SERPL BCG-MCNC: 4.1 G/DL (ref 3.5–5.2)
ALP SERPL-CCNC: 75 U/L (ref 40–150)
ALT SERPL W P-5'-P-CCNC: 24 U/L (ref 0–50)
ANION GAP SERPL CALCULATED.3IONS-SCNC: 13 MMOL/L (ref 7–15)
AST SERPL W P-5'-P-CCNC: 20 U/L (ref 0–45)
BILIRUB SERPL-MCNC: 0.5 MG/DL
BUN SERPL-MCNC: 4.2 MG/DL (ref 6–20)
CALCIUM SERPL-MCNC: 8.6 MG/DL (ref 8.8–10.4)
CHLORIDE SERPL-SCNC: 103 MMOL/L (ref 98–107)
CREAT SERPL-MCNC: 0.58 MG/DL (ref 0.51–0.95)
EGFRCR SERPLBLD CKD-EPI 2021: >90 ML/MIN/1.73M2
ERYTHROCYTE [DISTWIDTH] IN BLOOD BY AUTOMATED COUNT: 16.8 % (ref 10–15)
GLUCOSE SERPL-MCNC: 75 MG/DL (ref 70–99)
HCO3 SERPL-SCNC: 19 MMOL/L (ref 22–29)
HCT VFR BLD AUTO: 35.1 % (ref 35–47)
HGB BLD-MCNC: 10.6 G/DL (ref 11.7–15.7)
MCH RBC QN AUTO: 21.9 PG (ref 26.5–33)
MCHC RBC AUTO-ENTMCNC: 30.2 G/DL (ref 31.5–36.5)
MCV RBC AUTO: 72 FL (ref 78–100)
PLATELET # BLD AUTO: 456 10E3/UL (ref 150–450)
POTASSIUM SERPL-SCNC: 3.5 MMOL/L (ref 3.4–5.3)
POTASSIUM SERPL-SCNC: 3.9 MMOL/L (ref 3.4–5.3)
PROT SERPL-MCNC: 6.8 G/DL (ref 6.4–8.3)
RBC # BLD AUTO: 4.85 10E6/UL (ref 3.8–5.2)
SODIUM SERPL-SCNC: 135 MMOL/L (ref 135–145)
WBC # BLD AUTO: 7.1 10E3/UL (ref 4–11)

## 2025-06-04 PROCEDURE — 74177 CT ABD & PELVIS W/CONTRAST: CPT

## 2025-06-04 PROCEDURE — 99232 SBSQ HOSP IP/OBS MODERATE 35: CPT | Performed by: INTERNAL MEDICINE

## 2025-06-04 PROCEDURE — 250N000011 HC RX IP 250 OP 636: Performed by: INTERNAL MEDICINE

## 2025-06-04 PROCEDURE — 36415 COLL VENOUS BLD VENIPUNCTURE: CPT | Performed by: INTERNAL MEDICINE

## 2025-06-04 PROCEDURE — 250N000013 HC RX MED GY IP 250 OP 250 PS 637: Performed by: INTERNAL MEDICINE

## 2025-06-04 PROCEDURE — 80051 ELECTROLYTE PANEL: CPT | Performed by: INTERNAL MEDICINE

## 2025-06-04 PROCEDURE — 82040 ASSAY OF SERUM ALBUMIN: CPT | Performed by: INTERNAL MEDICINE

## 2025-06-04 PROCEDURE — 120N000004 HC R&B MS OVERFLOW

## 2025-06-04 PROCEDURE — 250N000013 HC RX MED GY IP 250 OP 250 PS 637: Performed by: STUDENT IN AN ORGANIZED HEALTH CARE EDUCATION/TRAINING PROGRAM

## 2025-06-04 PROCEDURE — 85027 COMPLETE CBC AUTOMATED: CPT | Performed by: INTERNAL MEDICINE

## 2025-06-04 PROCEDURE — 250N000013 HC RX MED GY IP 250 OP 250 PS 637: Performed by: HOSPITALIST

## 2025-06-04 PROCEDURE — 258N000003 HC RX IP 258 OP 636: Performed by: INTERNAL MEDICINE

## 2025-06-04 PROCEDURE — 250N000009 HC RX 250: Performed by: INTERNAL MEDICINE

## 2025-06-04 RX ORDER — TRAZODONE HYDROCHLORIDE 50 MG/1
50 TABLET ORAL AT BEDTIME
Status: DISCONTINUED | OUTPATIENT
Start: 2025-06-04 | End: 2025-06-05 | Stop reason: HOSPADM

## 2025-06-04 RX ORDER — ONDANSETRON 4 MG/1
4 TABLET, ORALLY DISINTEGRATING ORAL EVERY 6 HOURS PRN
Status: DISCONTINUED | OUTPATIENT
Start: 2025-06-04 | End: 2025-06-05 | Stop reason: HOSPADM

## 2025-06-04 RX ORDER — PANTOPRAZOLE SODIUM 40 MG/1
40 TABLET, DELAYED RELEASE ORAL
Status: DISCONTINUED | OUTPATIENT
Start: 2025-06-04 | End: 2025-06-05 | Stop reason: HOSPADM

## 2025-06-04 RX ORDER — ONDANSETRON 2 MG/ML
4 INJECTION INTRAMUSCULAR; INTRAVENOUS EVERY 6 HOURS PRN
Status: DISCONTINUED | OUTPATIENT
Start: 2025-06-04 | End: 2025-06-05 | Stop reason: HOSPADM

## 2025-06-04 RX ORDER — IOPAMIDOL 755 MG/ML
500 INJECTION, SOLUTION INTRAVASCULAR ONCE
Status: COMPLETED | OUTPATIENT
Start: 2025-06-04 | End: 2025-06-04

## 2025-06-04 RX ORDER — POTASSIUM CHLORIDE 1500 MG/1
20 TABLET, EXTENDED RELEASE ORAL ONCE
Status: COMPLETED | OUTPATIENT
Start: 2025-06-04 | End: 2025-06-04

## 2025-06-04 RX ADMIN — POTASSIUM CHLORIDE 20 MEQ: 1500 TABLET, EXTENDED RELEASE ORAL at 01:19

## 2025-06-04 RX ADMIN — TRAZODONE HYDROCHLORIDE 50 MG: 50 TABLET ORAL at 21:58

## 2025-06-04 RX ADMIN — PANTOPRAZOLE SODIUM 40 MG: 40 TABLET, DELAYED RELEASE ORAL at 10:22

## 2025-06-04 RX ADMIN — SODIUM CHLORIDE 65 ML: 9 INJECTION, SOLUTION INTRAVENOUS at 18:10

## 2025-06-04 RX ADMIN — SODIUM CHLORIDE: 0.9 INJECTION, SOLUTION INTRAVENOUS at 15:41

## 2025-06-04 RX ADMIN — LORAZEPAM 0.5 MG: 0.5 TABLET ORAL at 04:36

## 2025-06-04 RX ADMIN — ONDANSETRON 4 MG: 2 INJECTION, SOLUTION INTRAMUSCULAR; INTRAVENOUS at 19:40

## 2025-06-04 RX ADMIN — LORAZEPAM 0.5 MG: 0.5 TABLET ORAL at 21:58

## 2025-06-04 RX ADMIN — SODIUM CHLORIDE: 0.9 INJECTION, SOLUTION INTRAVENOUS at 03:24

## 2025-06-04 RX ADMIN — LORAZEPAM 0.5 MG: 0.5 TABLET ORAL at 15:47

## 2025-06-04 RX ADMIN — ACETAMINOPHEN 650 MG: 325 TABLET, FILM COATED ORAL at 19:50

## 2025-06-04 RX ADMIN — IOPAMIDOL 100 ML: 755 INJECTION, SOLUTION INTRAVENOUS at 18:07

## 2025-06-04 ASSESSMENT — ACTIVITIES OF DAILY LIVING (ADL)
ADLS_ACUITY_SCORE: 28
ADLS_ACUITY_SCORE: 20
ADLS_ACUITY_SCORE: 28
ADLS_ACUITY_SCORE: 20
ADLS_ACUITY_SCORE: 20
ADLS_ACUITY_SCORE: 29
ADLS_ACUITY_SCORE: 20
ADLS_ACUITY_SCORE: 20
ADLS_ACUITY_SCORE: 28

## 2025-06-04 NOTE — PLAN OF CARE
Goal Outcome Evaluation:[id number]      Plan of Care Reviewed With: patient    Overall Patient Progress: no changeOverall Patient Progress: no change    A&Ox4. VSS. Afebrile. Pt having intermittent nausea. No emesis. Ativan PO x1 given. Aromatherapy utilized. NS infusing @ 100 ml/hr. Pt denies pain. Voiding. On tele. Independent in room.     Problem: Adult Inpatient Plan of Care  Goal: Plan of Care Review  Description: The Plan of Care Review/Shift note should be completed every shift.  The Outcome Evaluation is a brief statement about your assessment that the patient is improving, declining, or no change.  This information will be displayed automatically on your shift  note.  Recent Flowsheet Documentation  Taken 6/3/2025 2318 by Pardeep Devlin RN  Plan of Care Reviewed With: patient  Overall Patient Progress: no change  Goal: Absence of Hospital-Acquired Illness or Injury  Intervention: Identify and Manage Fall Risk  Recent Flowsheet Documentation  Taken 6/3/2025 2200 by Pardeep Devlin RN  Safety Promotion/Fall Prevention:   assistive device/personal items within reach   clutter free environment maintained   nonskid shoes/slippers when out of bed   patient and family education   room near nurse's station   room organization consistent   safety round/check completed  Intervention: Prevent Skin Injury  Recent Flowsheet Documentation  Taken 6/3/2025 2200 by Pardeep Devlin RN  Body Position: position changed independently  Intervention: Prevent Infection  Recent Flowsheet Documentation  Taken 6/3/2025 2200 by Pardeep Devlin RN  Infection Prevention:   hand hygiene promoted   personal protective equipment utilized   rest/sleep promoted   equipment surfaces disinfected     Problem: Adult Inpatient Plan of Care  Goal: Plan of Care Review  Description: The Plan of Care Review/Shift note should be completed every shift.  The Outcome Evaluation is a brief statement about your assessment that the patient is improving,  "declining, or no change.  This information will be displayed automatically on your shift  note.  Outcome: Not Progressing  Flowsheets (Taken 6/3/2025 2315)  Plan of Care Reviewed With: patient  Overall Patient Progress: no change  Goal: Patient-Specific Goal (Individualized)  Description: You can add care plan individualizations to a care plan. Examples of Individualization might be:  \"Parent requests to be called daily at 9am for status\", \"I have a hard time hearing out of my right ear\", or \"Do not touch me to wake me up as it startles  me\".  Outcome: Not Progressing  Goal: Absence of Hospital-Acquired Illness or Injury  Outcome: Not Progressing  Intervention: Identify and Manage Fall Risk  Recent Flowsheet Documentation  Taken 6/3/2025 2200 by Pardeep Devlin RN  Safety Promotion/Fall Prevention:   assistive device/personal items within reach   clutter free environment maintained   nonskid shoes/slippers when out of bed   patient and family education   room near nurse's station   room organization consistent   safety round/check completed  Intervention: Prevent Skin Injury  Recent Flowsheet Documentation  Taken 6/3/2025 2200 by Pardeep Devlin RN  Body Position: position changed independently  Intervention: Prevent Infection  Recent Flowsheet Documentation  Taken 6/3/2025 2200 by Pardeep Devlin RN  Infection Prevention:   hand hygiene promoted   personal protective equipment utilized   rest/sleep promoted   equipment surfaces disinfected  Goal: Optimal Comfort and Wellbeing  Outcome: Not Progressing  Goal: Readiness for Transition of Care  Outcome: Not Progressing     Problem: Skin Injury Risk Increased  Goal: Skin Health and Integrity  Intervention: Plan: Nurse Driven Intervention: Moisture Management  Recent Flowsheet Documentation  Taken 6/3/2025 2200 by Pardeep Devlin RN  Moisture Interventions: Encourage regular toileting  Intervention: Optimize Skin Protection  Recent Flowsheet Documentation  Taken " 6/3/2025 2200 by Pardeep Devlin, RN  Activity Management: activity adjusted per tolerance  Head of Bed (HOB) Positioning: HOB at 20-30 degrees

## 2025-06-04 NOTE — PLAN OF CARE
"Provided cares for pt from [id number]. Pt a/o x4. C/o abd pain, declined pain medications. Independent in room. Voiding. Pt stated she has has multiple loose stool today. Emesis and dry heaving today, ativan given. Poor oral intake, pt stated she gets nauseous after eating. Tele SR, tele now discontinued. Continue monitoring intake and output.     /86 (BP Location: Left arm)   Pulse 52   Temp 98.7  F (37.1  C) (Oral)   Resp 16   Ht 1.753 m (5' 9\")   Wt 110.3 kg (243 lb 2.7 oz)   LMP 05/06/2025 (Approximate)   SpO2 100%   BMI 35.91 kg/m          Plan of Care Reviewed With: patient    Overall Patient Progress: no changeOverall Patient Progress: no change    Outcome Evaluation: Pt a/o x4. C/o abd pain, declined pain medications. Independent in room. Voiding. Emesis x1, declined antiemetics. Tele SR.      Problem: Adult Inpatient Plan of Care  Goal: Plan of Care Review  Description: The Plan of Care Review/Shift note should be completed every shift.  The Outcome Evaluation is a brief statement about your assessment that the patient is improving, declining, or no change.  This information will be displayed automatically on your shift  note.  Outcome: Progressing  Flowsheets (Taken 6/4/2025 1442)  Outcome Evaluation: Pt a/o x4. C/o abd pain, declined pain medications. Independent in room. Voiding. Emesis x1, declined antiemetics. Tele SR.  Plan of Care Reviewed With: patient  Overall Patient Progress: no change  Goal: Patient-Specific Goal (Individualized)  Description: You can add care plan individualizations to a care plan. Examples of Individualization might be:  \"Parent requests to be called daily at 9am for status\", \"I have a hard time hearing out of my right ear\", or \"Do not touch me to wake me up as it startles  me\".  Outcome: Progressing  Goal: Absence of Hospital-Acquired Illness or Injury  Outcome: Progressing  Intervention: Identify and Manage Fall Risk  Recent Flowsheet Documentation  Taken " 6/4/2025 1153 by Nu Alcala RN  Safety Promotion/Fall Prevention: (pt sleeping, ordered food but has not tried to eat yet) safety round/check completed  Taken 6/4/2025 0810 by Nu Alcala RN  Safety Promotion/Fall Prevention:   clutter free environment maintained   increased rounding and observation   nonskid shoes/slippers when out of bed   room near nurse's station   room organization consistent   safety round/check completed  Intervention: Prevent Skin Injury  Recent Flowsheet Documentation  Taken 6/4/2025 0810 by Nu Alcala RN  Body Position: position changed independently  Intervention: Prevent and Manage VTE (Venous Thromboembolism) Risk  Recent Flowsheet Documentation  Taken 6/4/2025 0810 by Nu Alcala RN  VTE Prevention/Management: SCDs off (sequential compression devices)  Intervention: Prevent Infection  Recent Flowsheet Documentation  Taken 6/4/2025 0810 by Nu Alcala RN  Infection Prevention:   equipment surfaces disinfected   hand hygiene promoted   rest/sleep promoted   personal protective equipment utilized  Goal: Optimal Comfort and Wellbeing  Outcome: Progressing  Intervention: Monitor Pain and Promote Comfort  Recent Flowsheet Documentation  Taken 6/4/2025 0810 by Nu Alcala RN  Pain Management Interventions:   medication offered but refused   rest  Goal: Readiness for Transition of Care  Outcome: Progressing     Problem: Skin Injury Risk Increased  Goal: Skin Health and Integrity  Outcome: Progressing  Intervention: Plan: Nurse Driven Intervention: Moisture Management  Recent Flowsheet Documentation  Taken 6/4/2025 0810 by Nu Alcala RN  Moisture Interventions: Encourage regular toileting  Intervention: Optimize Skin Protection  Recent Flowsheet Documentation  Taken 6/4/2025 0810 by Nu Alcala RN  Activity Management: up ad mable     Problem: Nausea and Vomiting  Goal: Nausea and Vomiting Relief  Outcome:  Progressing  Intervention: Prevent and Manage Nausea and Vomiting  Recent Flowsheet Documentation  Taken 6/4/2025 0810 by Nu Alcala RN  Nausea/Vomiting Interventions: (declined antiemetic)   sips of clear liquids given   other (see comments)

## 2025-06-04 NOTE — PROGRESS NOTES
Telemetry tech called saying that pt HR went up to 110-120s for about one minute but them HR returned back down to 65. RN (writer) went to check on pt and she said that she just had an episode of dry heaving/emesis.

## 2025-06-04 NOTE — PLAN OF CARE
"Goal Outcome Evaluation:      Plan of Care Reviewed With: patient    Overall Patient Progress: no change    Orientation: Alert and oriented x4. Anxious at times.   VSS. 98% on RA. afebrile.   LS: clear and equal bilaterally.   GI: Passing gas. Incontinent of liquid stool x1. Intermittent nausea. Ativan x1. Emesis x2   : Adequate urine output.   Skin: intact  Activity: Independent. Pt slept comfortably throughout shift.   Pain: 7/10 epigastric pain. Tylenol x1 with minimal relief. Heat in place for comfort.   Updates/Plan: potassium replaced x1. Worsening emesis after eating and drinking Gatorade. Encouraged sips of clears and ice chips overnight. Shower x1. Continue with current cares.           Problem: Adult Inpatient Plan of Care  Goal: Plan of Care Review  Description: The Plan of Care Review/Shift note should be completed every shift.  The Outcome Evaluation is a brief statement about your assessment that the patient is improving, declining, or no change.  This information will be displayed automatically on your shift  note.  Outcome: Progressing  Flowsheets (Taken 6/4/2025 5945)  Plan of Care Reviewed With: patient  Overall Patient Progress: no change  Goal: Patient-Specific Goal (Individualized)  Description: You can add care plan individualizations to a care plan. Examples of Individualization might be:  \"Parent requests to be called daily at 9am for status\", \"I have a hard time hearing out of my right ear\", or \"Do not touch me to wake me up as it startles  me\".  Outcome: Progressing  Goal: Absence of Hospital-Acquired Illness or Injury  Outcome: Progressing  Intervention: Identify and Manage Fall Risk  Recent Flowsheet Documentation  Taken 6/3/2025 5865 by Vanesa Camp, RN  Safety Promotion/Fall Prevention:   clutter free environment maintained   nonskid shoes/slippers when out of bed   patient and family education   room organization consistent   safety round/check completed   treat underlying " cause   treat reversible contributory factors  Intervention: Prevent Skin Injury  Recent Flowsheet Documentation  Taken 6/3/2025 2329 by Vanesa Camp RN  Body Position: position changed independently  Skin Protection:   adhesive use limited   tubing/devices free from skin contact  Intervention: Prevent and Manage VTE (Venous Thromboembolism) Risk  Recent Flowsheet Documentation  Taken 6/3/2025 2329 by Vanesa Camp RN  VTE Prevention/Management: SCDs off (sequential compression devices)  Intervention: Prevent Infection  Recent Flowsheet Documentation  Taken 6/3/2025 2329 by Vanesa Camp RN  Infection Prevention:   equipment surfaces disinfected   hand hygiene promoted   rest/sleep promoted  Goal: Optimal Comfort and Wellbeing  Outcome: Progressing  Intervention: Monitor Pain and Promote Comfort  Recent Flowsheet Documentation  Taken 6/4/2025 0435 by Vnaesa Camp RN  Pain Management Interventions: heat applied  Taken 6/3/2025 2329 by Vanesa Camp RN  Pain Management Interventions:   medication (see MAR)   heat applied  Goal: Readiness for Transition of Care  Outcome: Progressing     Problem: Skin Injury Risk Increased  Goal: Skin Health and Integrity  Outcome: Progressing  Intervention: Plan: Nurse Driven Intervention: Moisture Management  Recent Flowsheet Documentation  Taken 6/4/2025 0515 by Vanesa Camp RN  Bathing/Skin Care: shower  Taken 6/3/2025 2329 by Vanesa Camp RN  Moisture Interventions: Encourage regular toileting  Intervention: Optimize Skin Protection  Recent Flowsheet Documentation  Taken 6/3/2025 2329 by Vanesa Camp RN  Skin Protection:   adhesive use limited   tubing/devices free from skin contact  Activity Management: up ad mable  Head of Bed (HOB) Positioning: HOB at 20-30 degrees     Problem: Nausea and Vomiting  Goal: Nausea and Vomiting Relief  Outcome: Progressing  Intervention: Prevent and Manage Nausea and  Vomiting  Recent Flowsheet Documentation  Taken 6/3/2025 2329 by Vanesa Camp, RN  Nausea/Vomiting Interventions:   aromatherapy utilized   sips of clear liquids given   slow deep breathing encouraged   stimuli minimized

## 2025-06-04 NOTE — PROGRESS NOTES
Hospitalist Medicine Progress Note   [facility]       Hector Mazariegos is a 19-year-old young lady with history of anxiety, depression with significant recent stress for bridal shower, graduation, dealing with grief with her brother's death a month ago with episodes of panic attack while driving, abdominal pain 2 days prior to admission associated with nausea vomiting, epigastric and chest discomfort with use of THC Gummies and cannabis to help her sleep.  Was unable to keep things down was admitted 6/1/2025 to [facility].  Chest x-ray looked unremarkable and was treated with IV fluids, antinausea medications carefully with QT prolongation, IV Protonix therapy.  She is planning to have behavioral therapy as outpatient.  She had used melatonin in the past for sleep without good results and states that Prozac did not work for her.  She has proteinuria which needs to be followed as outpatient though at this time patient's creatinine and kidney function are reassuring patient continued to vomit and having nausea       Date of Admission:  6/1/2025  Assessment & Plan     Nausea  Vomiting   Epigastric pain  THC Gummies use  History of anxiety and depression-untreated  Insomnia  -Etiology multifactorial could be a component of anxiety, cannabis certainly is not helping her symptoms, could be possibly gastroenteritis with intake of bad rima drink.  Symptoms mildly improved since admission.    - Patient has had multiple visits to ER for her abdominal symptoms.  In the past she had undergone extensive workup with US, CT abdomen and pelvis, even EGD with biopsies which showed chronic gastritis.  She had also been treated for H. pylori in the past.   -Lab workup fairly reassuring.  Will check lipase  - No reports of weight loss, bowel movements are usually regular but lately she thinks that she is constipated and now has diarrhea x 1 day  - Symptoms are very similar to the prior episodes and  she thinks that they got triggered because of the recent stress.  Abdominal exam is reassuring.  Will get CT scan of the abdomen.   - Symptomatic management with IV fluids, antiemetics, PPI, and GI cocktail as needed, Tylenol as needed.  Advance diet as tolerated.  Work on bowel movements, start MiraLAX 17 g daily  - Extensively counseled patient on complete cessation of THC  - Patient with documented history of anxiety and depression.  She reported recent increase in his stress related to loss of loved 1, college, and work.  She reported having panic attacks while driving.  She is also having insomnia and have been using THC to help her sleep.  Past she was on Prozac which did not work for her.  She is planning to follow-up with behavioral therapist outpatient.  Discussed with the patient to work with the behavioral therapist and also with primary care provider to find alternative medication for her depression/anxiety.  Could consider starting patient on sertraline upon discharge.    She has agreed to taking trazodone for insomnia and anxiety.        Sinus bradycardia  Prolonged Qtc  Chest pain  Reported chest pain along with her epigastric abdominal pain and nausea and vomiting.  Troponins negative.  EKG without any ST segment or T wave changes.  Low suspicion for ACS EKG did show sinus bradycardia as well as prolonged QTc.  Patient denies any family history of sudden cardiac death.  -Monitor on telemetry  - Goal K above 4, goal magnesium above 2, repeat EKG tomorrow  - Avoid QTc prolonging medications, patient was on Zofran and Compazine, would avoid using it further.  Use Ativan instead     Albuminuria  Patient noticed to have proteinuria/albuminuria on UA.  Looks like that she has it for past 2 to 3 years.  Kidney function is reassuring.  -Defer further evaluation outpatient to PCP            Plan:   CT scan of the abdomen pelvis  Trazodone 50 mg daily  IV fluids continued with continued nausea  "vomiting  Patient continues to have nausea therefore we will continue to keep her in the hospital  Inpatient admission  Will do a CT scan of the abdomen if she continues to vomit tomorrow    Diet: Regular Diet Adult    DVT Prophylaxis: Low Risk/Ambulatory with no VTE prophylaxis indicated  Hackett Catheter: Not present  Code Status: Full Code         Medically Ready for Discharge: Anticipated Tomorrow    Clinically Significant Risk Factors Present on Admission        # Hypokalemia: Lowest K = 3.3 mmol/L in last 2 days, will replace as needed    # Hypocalcemia: Lowest Ca = 8.6 mg/dL in last 2 days, will monitor and replace as appropriate              # Anemia: based on hgb <11       # Obesity: Estimated body mass index is 35.91 kg/m  as calculated from the following:    Height as of this encounter: 1.753 m (5' 9\").    Weight as of this encounter: 110.3 kg (243 lb 2.7 oz).                Negrito Valero MD  Hospitalist Service  [facility]    ______________________________________________________________________    Interval History     Symptoms   Patient continues to vomit and have nausea she had 3 vomiting today    Review of Systems:   Denies any abdominal pain but had loose motions today    Data reviewed today: I reviewed all medications, new labs and imaging results over the last 24 hours.     Physical Exam   Vital Signs: Temp: 98  F (36.7  C) Temp src: Oral BP: (!) 124/91 Pulse: 62   Resp: 18 SpO2: 98 % O2 Device: None (Room air)    Weight: 243 lbs 2.68 oz      GENERAL: Patient is not in acute distress  HEENT: EOM+,Conjunctiva is clear   NECK:  no Jugular Venous distention  HEART: S1 S2 regular Rate and Rhythm, there is  no murmur,   LUNGS: Respirations are  not laboured, Lungs are  clear to auscultation without Crepitations or Wheezing   ABDOMEN: Soft , there is no tenderness , Bowel Sounds are  Positive   LOWER LIMBS: no Pedal Edema  Bilaterally   CNS:  Alert,  Oriented x 3, Moving all the Four " Limbs     Data   Recent Labs   Lab 06/04/25  1118 06/03/25  2342 06/03/25  0629 06/02/25  1125 06/01/25  1733 06/01/25  1733 06/01/25  1721   WBC 7.1  --   --   --   --   --  7.9   HGB 10.6*  --   --   --   --   --  11.1*   MCV 72*  --   --   --   --   --  73*   *  --   --   --   --   --  531*     --   --   --   --  138  --    POTASSIUM 3.9 3.5 3.3* 3.5   < > 3.4  --    CHLORIDE 103  --   --   --   --  99  --    CO2 19*  --   --   --   --  21*  --    BUN 4.2*  --   --   --   --  12.1  --    CR 0.58  --   --   --   --  0.74  --    ANIONGAP 13  --   --   --   --  18*  --    DANIELLA 8.6*  --   --   --   --  8.8  --    GLC 75  --   --   --   --  91  --    ALBUMIN 4.1  --   --   --   --  4.5  --    PROTTOTAL 6.8  --   --   --   --  7.7  --    BILITOTAL 0.5  --   --   --   --  0.5  --    ALKPHOS 75  --   --   --   --  87  --    ALT 24  --   --   --   --  25  --    AST 20  --   --   --   --  26  --    LIPASE  --   --   --  26  --   --   --     < > = values in this interval not displayed.         No results found for this or any previous visit (from the past 24 hours).

## 2025-06-05 VITALS
HEART RATE: 54 BPM | SYSTOLIC BLOOD PRESSURE: 125 MMHG | BODY MASS INDEX: 36.02 KG/M2 | HEIGHT: 69 IN | TEMPERATURE: 97.9 F | RESPIRATION RATE: 14 BRPM | WEIGHT: 243.17 LBS | OXYGEN SATURATION: 100 % | DIASTOLIC BLOOD PRESSURE: 74 MMHG

## 2025-06-05 LAB
ANION GAP SERPL CALCULATED.3IONS-SCNC: 14 MMOL/L (ref 7–15)
BUN SERPL-MCNC: 4.6 MG/DL (ref 6–20)
CALCIUM SERPL-MCNC: 8.4 MG/DL (ref 8.8–10.4)
CHLORIDE SERPL-SCNC: 102 MMOL/L (ref 98–107)
CREAT SERPL-MCNC: 0.63 MG/DL (ref 0.51–0.95)
EGFRCR SERPLBLD CKD-EPI 2021: >90 ML/MIN/1.73M2
GLUCOSE SERPL-MCNC: 71 MG/DL (ref 70–99)
HCO3 SERPL-SCNC: 20 MMOL/L (ref 22–29)
MAGNESIUM SERPL-MCNC: 1.7 MG/DL (ref 1.7–2.3)
POTASSIUM SERPL-SCNC: 3.6 MMOL/L (ref 3.4–5.3)
SODIUM SERPL-SCNC: 136 MMOL/L (ref 135–145)

## 2025-06-05 PROCEDURE — 83735 ASSAY OF MAGNESIUM: CPT | Performed by: STUDENT IN AN ORGANIZED HEALTH CARE EDUCATION/TRAINING PROGRAM

## 2025-06-05 PROCEDURE — 258N000003 HC RX IP 258 OP 636: Performed by: INTERNAL MEDICINE

## 2025-06-05 PROCEDURE — 82310 ASSAY OF CALCIUM: CPT | Performed by: INTERNAL MEDICINE

## 2025-06-05 PROCEDURE — 250N000013 HC RX MED GY IP 250 OP 250 PS 637: Performed by: INTERNAL MEDICINE

## 2025-06-05 PROCEDURE — 250N000011 HC RX IP 250 OP 636: Performed by: INTERNAL MEDICINE

## 2025-06-05 PROCEDURE — 99238 HOSP IP/OBS DSCHRG MGMT 30/<: CPT | Performed by: INTERNAL MEDICINE

## 2025-06-05 PROCEDURE — 36415 COLL VENOUS BLD VENIPUNCTURE: CPT | Performed by: INTERNAL MEDICINE

## 2025-06-05 PROCEDURE — 82435 ASSAY OF BLOOD CHLORIDE: CPT | Performed by: INTERNAL MEDICINE

## 2025-06-05 PROCEDURE — 250N000013 HC RX MED GY IP 250 OP 250 PS 637: Performed by: STUDENT IN AN ORGANIZED HEALTH CARE EDUCATION/TRAINING PROGRAM

## 2025-06-05 RX ORDER — POTASSIUM CHLORIDE 1500 MG/1
20 TABLET, EXTENDED RELEASE ORAL ONCE
Status: COMPLETED | OUTPATIENT
Start: 2025-06-05 | End: 2025-06-05

## 2025-06-05 RX ORDER — TRAZODONE HYDROCHLORIDE 50 MG/1
50 TABLET ORAL AT BEDTIME
Qty: 30 TABLET | Refills: 0 | Status: SHIPPED | OUTPATIENT
Start: 2025-06-05

## 2025-06-05 RX ORDER — TRAZODONE HYDROCHLORIDE 50 MG/1
50 TABLET ORAL AT BEDTIME
Qty: 30 TABLET | Refills: 0 | Status: SHIPPED | OUTPATIENT
Start: 2025-06-05 | End: 2025-06-05

## 2025-06-05 RX ORDER — PANTOPRAZOLE SODIUM 40 MG/1
40 TABLET, DELAYED RELEASE ORAL
COMMUNITY
Start: 2025-06-06

## 2025-06-05 RX ORDER — PANTOPRAZOLE SODIUM 40 MG/1
40 TABLET, DELAYED RELEASE ORAL
COMMUNITY
Start: 2025-06-06 | End: 2025-06-05

## 2025-06-05 RX ORDER — ONDANSETRON 4 MG/1
4 TABLET, ORALLY DISINTEGRATING ORAL EVERY 6 HOURS PRN
Qty: 30 TABLET | Refills: 0 | Status: SHIPPED | OUTPATIENT
Start: 2025-06-05 | End: 2025-06-05

## 2025-06-05 RX ORDER — ONDANSETRON 4 MG/1
4 TABLET, ORALLY DISINTEGRATING ORAL EVERY 6 HOURS PRN
Qty: 30 TABLET | Refills: 0 | Status: SHIPPED | OUTPATIENT
Start: 2025-06-05

## 2025-06-05 RX ADMIN — SODIUM CHLORIDE: 0.9 INJECTION, SOLUTION INTRAVENOUS at 01:22

## 2025-06-05 RX ADMIN — POTASSIUM CHLORIDE 20 MEQ: 1500 TABLET, EXTENDED RELEASE ORAL at 11:54

## 2025-06-05 RX ADMIN — ONDANSETRON 4 MG: 2 INJECTION, SOLUTION INTRAMUSCULAR; INTRAVENOUS at 01:29

## 2025-06-05 RX ADMIN — PANTOPRAZOLE SODIUM 40 MG: 40 TABLET, DELAYED RELEASE ORAL at 08:04

## 2025-06-05 ASSESSMENT — ACTIVITIES OF DAILY LIVING (ADL)
ADLS_ACUITY_SCORE: 29
ADLS_ACUITY_SCORE: 29
ADLS_ACUITY_SCORE: 28
ADLS_ACUITY_SCORE: 29
ADLS_ACUITY_SCORE: 32
ADLS_ACUITY_SCORE: 28
ADLS_ACUITY_SCORE: 32
ADLS_ACUITY_SCORE: 29
ADLS_ACUITY_SCORE: 32
ADLS_ACUITY_SCORE: 29
ADLS_ACUITY_SCORE: 28

## 2025-06-05 NOTE — PLAN OF CARE
Goal Outcome Evaluation:      Plan of Care Reviewed With: patient    Overall Patient Progress: no changeOverall Patient Progress: no change    Orientation: Alert and oriented x4  VSS. Afebrile. On RA.  LS: Clear   GI: Abdomen rounded. Pt reporting abdominal discomfort. Passing gas. No BM. Intermittent nausea present. Emesis episode x1. Zofran x2 and ativan x1 given. Poor PO intake.  : Adequate urine output.   Lines: R.AC infusing NS @ 100 ml/hr.   Skin: wnl   Activity: Independent.. Pt slept comfortably throughout shift.   Pain: Pt rating abdominal pain 3-7/10. PRN tylenol x1 given.  Updates/Plan: Continue with current cares.    Problem: Adult Inpatient Plan of Care  Goal: Plan of Care Review  Description: The Plan of Care Review/Shift note should be completed every shift.  The Outcome Evaluation is a brief statement about your assessment that the patient is improving, declining, or no change.  This information will be displayed automatically on your shift  note.  Recent Flowsheet Documentation  Taken 6/5/2025 0437 by Pardeep Devlin RN  Plan of Care Reviewed With: patient  Overall Patient Progress: no change  Goal: Absence of Hospital-Acquired Illness or Injury  Intervention: Identify and Manage Fall Risk  Recent Flowsheet Documentation  Taken 6/4/2025 1945 by Pardeep Devlin RN  Safety Promotion/Fall Prevention:   assistive device/personal items within reach   clutter free environment maintained   nonskid shoes/slippers when out of bed   patient and family education   room near nurse's station   room organization consistent   safety round/check completed  Intervention: Prevent Skin Injury  Recent Flowsheet Documentation  Taken 6/4/2025 1945 by Pardeep Devlin RN  Body Position: position changed independently  Skin Protection:   adhesive use limited   tubing/devices free from skin contact  Intervention: Prevent Infection  Recent Flowsheet Documentation  Taken 6/4/2025 1945 by Pardeep Devlin RN  Infection  "Prevention:   equipment surfaces disinfected   hand hygiene promoted   rest/sleep promoted   personal protective equipment utilized  Goal: Optimal Comfort and Wellbeing  Intervention: Monitor Pain and Promote Comfort  Recent Flowsheet Documentation  Taken 6/4/2025 1950 by Pardeep Devlin RN  Pain Management Interventions:   medication (see MAR)   heat applied     Problem: Adult Inpatient Plan of Care  Goal: Plan of Care Review  Description: The Plan of Care Review/Shift note should be completed every shift.  The Outcome Evaluation is a brief statement about your assessment that the patient is improving, declining, or no change.  This information will be displayed automatically on your shift  note.  Outcome: Progressing  Flowsheets (Taken 6/5/2025 0437)  Plan of Care Reviewed With: patient  Overall Patient Progress: no change  Goal: Patient-Specific Goal (Individualized)  Description: You can add care plan individualizations to a care plan. Examples of Individualization might be:  \"Parent requests to be called daily at 9am for status\", \"I have a hard time hearing out of my right ear\", or \"Do not touch me to wake me up as it startles  me\".  Outcome: Progressing  Goal: Absence of Hospital-Acquired Illness or Injury  Outcome: Progressing  Intervention: Identify and Manage Fall Risk  Recent Flowsheet Documentation  Taken 6/4/2025 1945 by Pardeep Devlin RN  Safety Promotion/Fall Prevention:   assistive device/personal items within reach   clutter free environment maintained   nonskid shoes/slippers when out of bed   patient and family education   room near nurse's station   room organization consistent   safety round/check completed  Intervention: Prevent Skin Injury  Recent Flowsheet Documentation  Taken 6/4/2025 1945 by Pardeep Devlin RN  Body Position: position changed independently  Skin Protection:   adhesive use limited   tubing/devices free from skin contact  Intervention: Prevent Infection  Recent Flowsheet " Documentation  Taken 6/4/2025 1945 by Pardeep Devlin RN  Infection Prevention:   equipment surfaces disinfected   hand hygiene promoted   rest/sleep promoted   personal protective equipment utilized  Goal: Optimal Comfort and Wellbeing  Outcome: Progressing  Intervention: Monitor Pain and Promote Comfort  Recent Flowsheet Documentation  Taken 6/4/2025 1950 by Pardeep Devlin RN  Pain Management Interventions:   medication (see MAR)   heat applied  Goal: Readiness for Transition of Care  Outcome: Progressing     Problem: Skin Injury Risk Increased  Goal: Skin Health and Integrity  Intervention: Plan: Nurse Driven Intervention: Moisture Management  Recent Flowsheet Documentation  Taken 6/4/2025 1945 by Pardeep Devlin RN  Moisture Interventions: Encourage regular toileting  Intervention: Optimize Skin Protection  Recent Flowsheet Documentation  Taken 6/4/2025 1945 by Pardeep Devlin RN  Skin Protection:   adhesive use limited   tubing/devices free from skin contact  Activity Management: up ad mable  Head of Bed (HOB) Positioning: HOB at 20-30 degrees     Problem: Nausea and Vomiting  Goal: Nausea and Vomiting Relief  Outcome: Progressing  Intervention: Prevent and Manage Nausea and Vomiting  Recent Flowsheet Documentation  Taken 6/5/2025 0200 by Pardeep Devlin RN  Nausea/Vomiting Interventions:   cool cloth applied   sips of clear liquids given   slow deep breathing encouraged   stimuli minimized  Taken 6/4/2025 1945 by Pardeep Devlin RN  Nausea/Vomiting Interventions:   antiemetic   sips of clear liquids given   stimuli minimized   slow deep breathing encouraged

## 2025-06-05 NOTE — DISCHARGE SUMMARY
"[facility]  Hospitalist Discharge Summary      Date of Admission:  6/1/2025  Date of Discharge:  6/5/2025  Discharging Provider: Negrito Valero MD  Discharge Service: Hospitalist Service    Discharge Diagnoses   Nausea  Vomiting   Epigastric pain  THC Gummies use  History of anxiety and depression-untreated  Insomnia  Sinus bradycardia  Prolonged (QTc 468 msec)  Chest pain    Clinically Significant Risk Factors     # Obesity: Estimated body mass index is 35.91 kg/m  as calculated from the following:    Height as of this encounter: 1.753 m (5' 9\").    Weight as of this encounter: 110.3 kg (243 lb 2.7 oz).       Follow-ups Needed After Discharge   Follow-up Appointments       Follow Up      Follow up with gastroenterology regarding nausea and vomiting which seems to be chronic        Hospital to Primary Care - Establish PCP Referral      Please be aware that coverage of these services is subject to the terms and limitations of your health insurance plan.  Call member services at your health plan with any benefit or coverage questions.    Schedule Primary Care visit within: 7 Days   Additional Information: Follow-up of nausea and vomiting that seems to be chronic           {     Discharge Disposition   Discharged to home  Condition at discharge: [facility]   Hector Mazariegos is a 19-year-old young lady with history of anxiety, depression with significant recent stress for bridal shower, graduation, dealing with grief with her brother's death a month ago with episodes of panic attack while driving, abdominal pain 2 days prior to admission associated with nausea vomiting, epigastric and chest discomfort with use of THC Gummies and cannabis to help her sleep.  Was unable to keep things down was admitted 6/1/2025 to [facility].  Chest x-ray looked unremarkable and was treated with IV fluids, antinausea medications carefully with QT prolongation, IV Protonix therapy.  She is " planning to have behavioral therapy as outpatient.  She had used melatonin in the past for sleep without good results and states that Prozac did not work for her.  She has proteinuria which needs to be followed as outpatient though at this time patient's creatinine and kidney function are reassuring patient continued to vomit and having nausea which has somewhat decreased on the day of discharge.  She is asked to follow-up with gastroenterology as she did before.  Protonix was given for history of gastritis in the past..  QTc needs to be monitored but is less than 500 ms and she is being given Zofran for nausea.  CT scan of the abdomen did not show any significant abnormalities as below except for some free fluid small amount in the pelvis.     Consultations This Hospital Stay   None    Code Status   Full Code    Time Spent on this Encounter   I, Negrito Valero MD, personally saw the patient today and spent less than or equal to 30 minutes discharging this patient.       Negrito Valero MD  [facility]  [address] [place] [address]  Phone: [phone]  Fax: [phone]  ______________________________________________________________________    Physical Exam   Vital Signs: Temp: 97.9  F (36.6  C) Temp src: Axillary BP: 124/76 Pulse: 85   Resp: 16 SpO2: 98 % O2 Device: None (Room air)    Weight: 243 lbs 2.68 oz  GENERAL: Patient is not in acute distress  HEENT: EOM+,Conjunctiva is clear   NECK:  no Jugular Venous distention  HEART: S1 S2 regular Rate and Rhythm, there is  no murmur,   LUNGS: Respirations are  not laboured, Lungs are  clear to auscultation without Crepitations or Wheezing   ABDOMEN: Soft , there is no tenderness , Bowel Sounds are  Positive   LOWER LIMBS: no Pedal Edema  Bilaterally   CNS:  Alert,  Oriented x 3, Moving all the Four Limbs        Primary Care Physician   Physician No Ref-Primary    Discharge Orders      Hospital to Primary Care - Establish PCP  Referral      Reason for your hospital stay    abdominal pain 2 days prior to admission associated with nausea vomiting, epigastric and chest discomfort     Activity    Your activity upon discharge: activity as tolerated     Follow Up    Follow up with gastroenterology regarding nausea and vomiting which seems to be chronic     Diet    Follow this diet upon discharge: Current Diet:Orders Placed This Encounter      Regular Diet Adult       Significant Results and Procedures   Most Recent 3 CBC's:  Recent Labs   Lab Test 06/04/25  1118 06/01/25  1721 01/21/25  2129   WBC 7.1 7.9 7.8   HGB 10.6* 11.1* 11.8   MCV 72* 73* 72*   * 531* 495*     Most Recent 3 BMP's:  Recent Labs   Lab Test 06/05/25  0528 06/04/25  1118 06/03/25  2342 06/02/25  1125 06/01/25  1733    135  --   --  138   POTASSIUM 3.6 3.9 3.5   < > 3.4   CHLORIDE 102 103  --   --  99   CO2 20* 19*  --   --  21*   BUN 4.6* 4.2*  --   --  12.1   CR 0.63 0.58  --   --  0.74   ANIONGAP 14 13  --   --  18*   DANIELLA 8.4* 8.6*  --   --  8.8   GLC 71 75  --   --  91    < > = values in this interval not displayed.     Most Recent 2 LFT's:  Recent Labs   Lab Test 06/04/25  1118 06/01/25  1733   AST 20 26   ALT 24 25   ALKPHOS 75 87   BILITOTAL 0.5 0.5     Most Recent Urinalysis:  Recent Labs   Lab Test 06/01/25  1946   COLOR Yellow   APPEARANCE Clear   URINEGLC Negative   URINEBILI Negative   URINEKETONE 100*   SG 1.030   UBLD Negative   URINEPH 6.0   PROTEIN 100*   NITRITE Negative   LEUKEST Trace*   RBCU 5*   WBCU 4   ,   Results for orders placed or performed during the hospital encounter of 06/01/25   XR Chest 2 Views    Narrative    EXAM: XR CHEST 2 VIEWS  LOCATION: [facility]  DATE: 6/1/2025    INDICATION: syncope, heart burn chest pain  COMPARISON: PA and lateral views of the chest 5/31/2024      Impression    IMPRESSION: Negative chest.   CT Abdomen Pelvis w Contrast    Narrative    EXAM: CT ABDOMEN AND PELVIS WITH  CONTRAST  LOCATION: [facility]  DATE/TIME: 6/4/2025 6:17 PM CDT    INDICATION: Nausea and vomiting.  COMPARISON: 1/20/2025.    TECHNIQUE: CT scan of the abdomen and pelvis was performed following injection of IV contrast. Multiplanar reformats were obtained. Dose reduction techniques were used.  CONTRAST: 100 mL Isovue-370.    FINDINGS:    LOWER CHEST: Unremarkable.    HEPATOBILIARY: Unremarkable.    SPLEEN: Unremarkable.    PANCREAS: Unremarkable.    ADRENAL GLANDS: Unremarkable.    KIDNEYS/BLADDER: Unremarkable.    BOWEL: The stomach, small and large bowel are normal in caliber. Normal appendix. No bowel wall thickening, pneumatosis or free intraperitoneal gas.    LYMPH NODES: Unremarkable.    PELVIC ORGANS: No acute findings.    MUSCULOSKELETAL: No acute findings.    OTHER: A very small amount of free fluid in the pelvis.      Impression    IMPRESSION:   1.  A very small amount of free fluid in the pelvis. This is nonspecific, but could relate to a ruptured ovarian cyst.  2.  No other acute abnormality identified in the abdomen or pelvis. Normal appendix.        Discharge Medications   Current Discharge Medication List        START taking these medications    Details   ondansetron (ZOFRAN ODT) 4 MG ODT tab Take 1 tablet (4 mg) by mouth every 6 hours as needed (Nausea and Vomiting).  Qty: 30 tablet, Refills: 0    Associated Diagnoses: Intractable nausea and vomiting      pantoprazole (PROTONIX) 40 MG EC tablet Take 1 tablet (40 mg) by mouth every morning (before breakfast).    Associated Diagnoses: Epigastric pain      traZODone (DESYREL) 50 MG tablet Take 1 tablet (50 mg) by mouth at bedtime.  Qty: 30 tablet, Refills: 0    Associated Diagnoses: Insomnia, unspecified type           Allergies   No Known Allergies

## 2025-06-05 NOTE — PROVIDER NOTIFICATION
Paged Cross cover that pt dry heaving and episodes of vomiting but pt is not due for next dose of ativan yet to help with nausea. Also mentioned that pt was asking if they could get an H.pylori test ordered since she has had it in the past.     Dr. Workman responded that he will add Zofran and that he will let the morning hospitalist determine if they H.pylori will be needed.

## 2025-06-05 NOTE — PLAN OF CARE
"Provided cares for pt from [id number]. Pt a/o x4. C/o abdominal soreness form vomiting. C/o nausea but no episodes of emesis this morning. Independent in room. Pt stated she is still having diarrhea. Pt showered today. K replaced. IV removed. Went over discharge papers with pt. Pt verbalized understanding. All questions answered. Discharge medications sent home with pt. All belongings sent home with pt. Pt discharged home with mother.     /74 (BP Location: Right arm, Patient Position: Sitting, Cuff Size: Adult Large)   Pulse 54   Temp 97.9  F (36.6  C) (Oral)   Resp 14   Ht 1.753 m (5' 9\")   Wt 110.3 kg (243 lb 2.7 oz)   LMP 05/06/2025 (Approximate)   SpO2 100%   BMI 35.91 kg/m         Plan of Care Reviewed With: patient    Overall Patient Progress: no changeOverall Patient Progress: no change    Outcome Evaluation: Pt a/o x4. C/o abdominal soreness form vomiting. Plan for discharge home today.      Problem: Adult Inpatient Plan of Care  Goal: Plan of Care Review  Description: The Plan of Care Review/Shift note should be completed every shift.  The Outcome Evaluation is a brief statement about your assessment that the patient is improving, declining, or no change.  This information will be displayed automatically on your shift  note.  Outcome: Progressing  Flowsheets (Taken 6/5/2025 1358)  Outcome Evaluation: Pt a/o x4. C/o abdominal soreness form vomiting. Plan for discharge home today.  Plan of Care Reviewed With: patient  Overall Patient Progress: no change  Goal: Patient-Specific Goal (Individualized)  Description: You can add care plan individualizations to a care plan. Examples of Individualization might be:  \"Parent requests to be called daily at 9am for status\", \"I have a hard time hearing out of my right ear\", or \"Do not touch me to wake me up as it startles  me\".  Outcome: Progressing  Goal: Absence of Hospital-Acquired Illness or Injury  Outcome: Progressing  Intervention: Identify and Manage " Fall Risk  Recent Flowsheet Documentation  Taken 6/5/2025 0729 by Nu Alcala RN  Safety Promotion/Fall Prevention:   clutter free environment maintained   nonskid shoes/slippers when out of bed   room near nurse's station   room organization consistent   safety round/check completed  Intervention: Prevent Skin Injury  Recent Flowsheet Documentation  Taken 6/5/2025 0729 by Nu Alcala RN  Body Position: position changed independently  Intervention: Prevent and Manage VTE (Venous Thromboembolism) Risk  Recent Flowsheet Documentation  Taken 6/5/2025 0729 by Nu Alcala RN  VTE Prevention/Management: SCDs off (sequential compression devices)  Intervention: Prevent Infection  Recent Flowsheet Documentation  Taken 6/5/2025 0729 by Nu Alcala RN  Infection Prevention:   equipment surfaces disinfected   hand hygiene promoted   personal protective equipment utilized   rest/sleep promoted   single patient room provided  Goal: Optimal Comfort and Wellbeing  Outcome: Progressing  Goal: Readiness for Transition of Care  Outcome: Progressing     Problem: Skin Injury Risk Increased  Goal: Skin Health and Integrity  Outcome: Progressing  Intervention: Plan: Nurse Driven Intervention: Moisture Management  Recent Flowsheet Documentation  Taken 6/5/2025 1130 by Nu Alcala RN  Bathing/Skin Care:   dressed/undressed   linen changed   shower  Taken 6/5/2025 0729 by Nu Alcala RN  Moisture Interventions: Encourage regular toileting  Intervention: Optimize Skin Protection  Recent Flowsheet Documentation  Taken 6/5/2025 0729 by Nu Alcala RN  Activity Management: activity adjusted per tolerance     Problem: Nausea and Vomiting  Goal: Nausea and Vomiting Relief  Outcome: Progressing  Intervention: Prevent and Manage Nausea and Vomiting  Recent Flowsheet Documentation  Taken 6/5/2025 1130 by Nu Alcala RN  Oral Care:   teeth brushed   tongue brushed  Taken 6/5/2025  0729 by Nu Alcala RN  Nausea/Vomiting Interventions: (antiemetic offered pt refused) other (see comments)

## 2025-06-05 NOTE — CONSULTS
SW consulted due to patient needing a PCP.   SW scheduled patient an appt for 12:40pm at [facility] on 6/9/25. SW provided appointment information at bedside. Other SW needs denied. CM signing off.     ADELINA Quinones   Social Work Care Manager   [facility]   [phone]  FAUSTINO Gloria on 6/5/2025 at 2:07 PM

## 2025-06-07 ENCOUNTER — VIRTUAL VISIT (OUTPATIENT)
Dept: URGENT CARE | Facility: CLINIC | Age: 20
End: 2025-06-07
Payer: COMMERCIAL

## 2025-06-07 ENCOUNTER — HOSPITAL ENCOUNTER (OUTPATIENT)
Facility: CLINIC | Age: 20
Setting detail: OBSERVATION
Discharge: HOME | End: 2025-06-08
Attending: EMERGENCY MEDICINE | Admitting: PHYSICIAN ASSISTANT
Payer: COMMERCIAL

## 2025-06-07 DIAGNOSIS — E87.6 HYPOKALEMIA: ICD-10-CM

## 2025-06-07 DIAGNOSIS — R11.2 NAUSEA AND VOMITING, UNSPECIFIED VOMITING TYPE: ICD-10-CM

## 2025-06-07 DIAGNOSIS — R11.2 INTRACTABLE NAUSEA AND VOMITING: Primary | ICD-10-CM

## 2025-06-07 DIAGNOSIS — R10.84 ABDOMINAL PAIN, GENERALIZED: ICD-10-CM

## 2025-06-07 DIAGNOSIS — R07.9 CHEST PAIN, UNSPECIFIED TYPE: ICD-10-CM

## 2025-06-07 DIAGNOSIS — K29.00 ACUTE GASTRITIS WITHOUT HEMORRHAGE, UNSPECIFIED GASTRITIS TYPE: ICD-10-CM

## 2025-06-07 LAB
ADV 40+41 DNA STL QL NAA+NON-PROBE: NEGATIVE
ALBUMIN SERPL BCG-MCNC: 4.5 G/DL (ref 3.5–5.2)
ALP SERPL-CCNC: 81 U/L (ref 40–150)
ALT SERPL W P-5'-P-CCNC: 20 U/L (ref 0–50)
ANION GAP SERPL CALCULATED.3IONS-SCNC: 17 MMOL/L (ref 7–15)
AST SERPL W P-5'-P-CCNC: 20 U/L (ref 0–45)
ASTRO TYP 1-8 RNA STL QL NAA+NON-PROBE: NEGATIVE
BASOPHILS # BLD AUTO: 0 10E3/UL (ref 0–0.2)
BASOPHILS NFR BLD AUTO: 0 %
BILIRUB SERPL-MCNC: 0.8 MG/DL
BUN SERPL-MCNC: 9.5 MG/DL (ref 6–20)
C CAYETANENSIS DNA STL QL NAA+NON-PROBE: NEGATIVE
CALCIUM SERPL-MCNC: 9.3 MG/DL (ref 8.8–10.4)
CAMPYLOBACTER DNA SPEC NAA+PROBE: NEGATIVE
CHLORIDE SERPL-SCNC: 96 MMOL/L (ref 98–107)
CREAT SERPL-MCNC: 0.74 MG/DL (ref 0.51–0.95)
CRYPTOSP DNA STL QL NAA+NON-PROBE: NEGATIVE
E COLI O157 DNA STL QL NAA+NON-PROBE: NORMAL
E HISTOLYT DNA STL QL NAA+NON-PROBE: NEGATIVE
EAEC ASTA GENE ISLT QL NAA+PROBE: NEGATIVE
EC STX1+STX2 GENES STL QL NAA+NON-PROBE: NEGATIVE
EGFRCR SERPLBLD CKD-EPI 2021: >90 ML/MIN/1.73M2
EOSINOPHIL # BLD AUTO: 0 10E3/UL (ref 0–0.7)
EOSINOPHIL NFR BLD AUTO: 0 %
EPEC EAE GENE STL QL NAA+NON-PROBE: NEGATIVE
ERYTHROCYTE [DISTWIDTH] IN BLOOD BY AUTOMATED COUNT: 17.6 % (ref 10–15)
ETEC LTA+ST1A+ST1B TOX ST NAA+NON-PROBE: NEGATIVE
G LAMBLIA DNA STL QL NAA+NON-PROBE: NEGATIVE
GLUCOSE SERPL-MCNC: 79 MG/DL (ref 70–99)
HCG SERPL QL: NEGATIVE
HCO3 SERPL-SCNC: 23 MMOL/L (ref 22–29)
HCT VFR BLD AUTO: 39.6 % (ref 35–47)
HGB BLD-MCNC: 12 G/DL (ref 11.7–15.7)
HOLD SPECIMEN: NORMAL
HOLD SPECIMEN: NORMAL
IMM GRANULOCYTES # BLD: 0 10E3/UL
IMM GRANULOCYTES NFR BLD: 0 %
LIPASE SERPL-CCNC: 39 U/L (ref 13–60)
LYMPHOCYTES # BLD AUTO: 1.7 10E3/UL (ref 0.8–5.3)
LYMPHOCYTES NFR BLD AUTO: 21 %
MAGNESIUM SERPL-MCNC: 1.7 MG/DL (ref 1.7–2.3)
MCH RBC QN AUTO: 21.7 PG (ref 26.5–33)
MCHC RBC AUTO-ENTMCNC: 30.3 G/DL (ref 31.5–36.5)
MCV RBC AUTO: 72 FL (ref 78–100)
MONOCYTES # BLD AUTO: 0.5 10E3/UL (ref 0–1.3)
MONOCYTES NFR BLD AUTO: 6 %
NEUTROPHILS # BLD AUTO: 6 10E3/UL (ref 1.6–8.3)
NEUTROPHILS NFR BLD AUTO: 73 %
NOROVIRUS GI+II RNA STL QL NAA+NON-PROBE: NEGATIVE
NRBC # BLD AUTO: 0 10E3/UL
NRBC BLD AUTO-RTO: 0 /100
P SHIGELLOIDES DNA STL QL NAA+NON-PROBE: NEGATIVE
PHOSPHATE SERPL-MCNC: 2.9 MG/DL (ref 2.5–4.5)
PLATELET # BLD AUTO: 539 10E3/UL (ref 150–450)
POTASSIUM SERPL-SCNC: 3.3 MMOL/L (ref 3.4–5.3)
POTASSIUM SERPL-SCNC: 3.4 MMOL/L (ref 3.4–5.3)
PROT SERPL-MCNC: 7.8 G/DL (ref 6.4–8.3)
RBC # BLD AUTO: 5.53 10E6/UL (ref 3.8–5.2)
RVA RNA STL QL NAA+NON-PROBE: NEGATIVE
SALMONELLA SP RPOD STL QL NAA+PROBE: NEGATIVE
SAPO I+II+IV+V RNA STL QL NAA+NON-PROBE: NEGATIVE
SHIGELLA SP+EIEC IPAH ST NAA+NON-PROBE: NEGATIVE
SODIUM SERPL-SCNC: 136 MMOL/L (ref 135–145)
TROPONIN T SERPL HS-MCNC: <6 NG/L
V CHOLERAE DNA SPEC QL NAA+PROBE: NEGATIVE
VIBRIO DNA SPEC NAA+PROBE: NEGATIVE
WBC # BLD AUTO: 8.2 10E3/UL (ref 4–11)
Y ENTEROCOL DNA STL QL NAA+PROBE: NEGATIVE

## 2025-06-07 PROCEDURE — 250N000011 HC RX IP 250 OP 636: Performed by: STUDENT IN AN ORGANIZED HEALTH CARE EDUCATION/TRAINING PROGRAM

## 2025-06-07 PROCEDURE — 96375 TX/PRO/DX INJ NEW DRUG ADDON: CPT

## 2025-06-07 PROCEDURE — 99285 EMERGENCY DEPT VISIT HI MDM: CPT | Mod: 25

## 2025-06-07 PROCEDURE — 83690 ASSAY OF LIPASE: CPT | Performed by: EMERGENCY MEDICINE

## 2025-06-07 PROCEDURE — 93005 ELECTROCARDIOGRAM TRACING: CPT

## 2025-06-07 PROCEDURE — 84132 ASSAY OF SERUM POTASSIUM: CPT | Performed by: STUDENT IN AN ORGANIZED HEALTH CARE EDUCATION/TRAINING PROGRAM

## 2025-06-07 PROCEDURE — 96366 THER/PROPH/DIAG IV INF ADDON: CPT

## 2025-06-07 PROCEDURE — 84484 ASSAY OF TROPONIN QUANT: CPT | Performed by: EMERGENCY MEDICINE

## 2025-06-07 PROCEDURE — 250N000013 HC RX MED GY IP 250 OP 250 PS 637: Performed by: PHYSICIAN ASSISTANT

## 2025-06-07 PROCEDURE — 250N000011 HC RX IP 250 OP 636: Mod: JZ | Performed by: EMERGENCY MEDICINE

## 2025-06-07 PROCEDURE — 99207 PR NON-BILLABLE SERV PER CHARTING: CPT

## 2025-06-07 PROCEDURE — G0378 HOSPITAL OBSERVATION PER HR: HCPCS

## 2025-06-07 PROCEDURE — 258N000003 HC RX IP 258 OP 636: Performed by: EMERGENCY MEDICINE

## 2025-06-07 PROCEDURE — 36415 COLL VENOUS BLD VENIPUNCTURE: CPT | Performed by: EMERGENCY MEDICINE

## 2025-06-07 PROCEDURE — 87507 IADNA-DNA/RNA PROBE TQ 12-25: CPT | Performed by: PHYSICIAN ASSISTANT

## 2025-06-07 PROCEDURE — 250N000011 HC RX IP 250 OP 636: Performed by: PHYSICIAN ASSISTANT

## 2025-06-07 PROCEDURE — 87338 HPYLORI STOOL AG IA: CPT | Performed by: PHYSICIAN ASSISTANT

## 2025-06-07 PROCEDURE — 96365 THER/PROPH/DIAG IV INF INIT: CPT

## 2025-06-07 PROCEDURE — 36415 COLL VENOUS BLD VENIPUNCTURE: CPT | Performed by: STUDENT IN AN ORGANIZED HEALTH CARE EDUCATION/TRAINING PROGRAM

## 2025-06-07 PROCEDURE — 84703 CHORIONIC GONADOTROPIN ASSAY: CPT | Performed by: EMERGENCY MEDICINE

## 2025-06-07 PROCEDURE — 83735 ASSAY OF MAGNESIUM: CPT | Performed by: PHYSICIAN ASSISTANT

## 2025-06-07 PROCEDURE — 85004 AUTOMATED DIFF WBC COUNT: CPT | Performed by: EMERGENCY MEDICINE

## 2025-06-07 PROCEDURE — 99222 1ST HOSP IP/OBS MODERATE 55: CPT | Performed by: PHYSICIAN ASSISTANT

## 2025-06-07 PROCEDURE — 80053 COMPREHEN METABOLIC PANEL: CPT | Performed by: EMERGENCY MEDICINE

## 2025-06-07 PROCEDURE — 258N000003 HC RX IP 258 OP 636: Performed by: PHYSICIAN ASSISTANT

## 2025-06-07 PROCEDURE — 84100 ASSAY OF PHOSPHORUS: CPT | Performed by: PHYSICIAN ASSISTANT

## 2025-06-07 PROCEDURE — 250N000009 HC RX 250: Performed by: EMERGENCY MEDICINE

## 2025-06-07 PROCEDURE — 250N000013 HC RX MED GY IP 250 OP 250 PS 637: Performed by: EMERGENCY MEDICINE

## 2025-06-07 PROCEDURE — 96361 HYDRATE IV INFUSION ADD-ON: CPT

## 2025-06-07 RX ORDER — ONDANSETRON 2 MG/ML
4 INJECTION INTRAMUSCULAR; INTRAVENOUS EVERY 6 HOURS PRN
Status: DISCONTINUED | OUTPATIENT
Start: 2025-06-07 | End: 2025-06-08 | Stop reason: HOSPADM

## 2025-06-07 RX ORDER — METOCLOPRAMIDE HYDROCHLORIDE 5 MG/ML
10 INJECTION INTRAMUSCULAR; INTRAVENOUS ONCE
Status: COMPLETED | OUTPATIENT
Start: 2025-06-07 | End: 2025-06-07

## 2025-06-07 RX ORDER — AMOXICILLIN 250 MG
2 CAPSULE ORAL 2 TIMES DAILY PRN
Status: DISCONTINUED | OUTPATIENT
Start: 2025-06-07 | End: 2025-06-08 | Stop reason: HOSPADM

## 2025-06-07 RX ORDER — HALOPERIDOL 5 MG/ML
2 INJECTION INTRAMUSCULAR ONCE
Status: COMPLETED | OUTPATIENT
Start: 2025-06-07 | End: 2025-06-07

## 2025-06-07 RX ORDER — PROCHLORPERAZINE MALEATE 5 MG/1
10 TABLET ORAL EVERY 6 HOURS PRN
Status: DISCONTINUED | OUTPATIENT
Start: 2025-06-07 | End: 2025-06-08 | Stop reason: HOSPADM

## 2025-06-07 RX ORDER — ONDANSETRON 2 MG/ML
4 INJECTION INTRAMUSCULAR; INTRAVENOUS EVERY 30 MIN PRN
Status: DISCONTINUED | OUTPATIENT
Start: 2025-06-07 | End: 2025-06-07

## 2025-06-07 RX ORDER — MAGNESIUM SULFATE HEPTAHYDRATE 40 MG/ML
2 INJECTION, SOLUTION INTRAVENOUS ONCE
Status: COMPLETED | OUTPATIENT
Start: 2025-06-07 | End: 2025-06-08

## 2025-06-07 RX ORDER — ACETAMINOPHEN 325 MG/1
650 TABLET ORAL EVERY 4 HOURS PRN
Status: DISCONTINUED | OUTPATIENT
Start: 2025-06-07 | End: 2025-06-08 | Stop reason: HOSPADM

## 2025-06-07 RX ORDER — POTASSIUM CHLORIDE 1500 MG/1
40 TABLET, EXTENDED RELEASE ORAL ONCE
Status: COMPLETED | OUTPATIENT
Start: 2025-06-07 | End: 2025-06-07

## 2025-06-07 RX ORDER — OMEPRAZOLE 20 MG/1
20 CAPSULE, DELAYED RELEASE ORAL DAILY
COMMUNITY

## 2025-06-07 RX ORDER — LIDOCAINE HYDROCHLORIDE 20 MG/ML
15 SOLUTION OROPHARYNGEAL ONCE
Status: COMPLETED | OUTPATIENT
Start: 2025-06-07 | End: 2025-06-07

## 2025-06-07 RX ORDER — LIDOCAINE 40 MG/G
CREAM TOPICAL
Status: DISCONTINUED | OUTPATIENT
Start: 2025-06-07 | End: 2025-06-08 | Stop reason: HOSPADM

## 2025-06-07 RX ORDER — MAGNESIUM HYDROXIDE/ALUMINUM HYDROXICE/SIMETHICONE 120; 1200; 1200 MG/30ML; MG/30ML; MG/30ML
15 SUSPENSION ORAL ONCE
Status: COMPLETED | OUTPATIENT
Start: 2025-06-07 | End: 2025-06-07

## 2025-06-07 RX ORDER — SODIUM CHLORIDE 9 MG/ML
INJECTION, SOLUTION INTRAVENOUS CONTINUOUS
Status: ACTIVE | OUTPATIENT
Start: 2025-06-07 | End: 2025-06-08

## 2025-06-07 RX ORDER — LORAZEPAM 2 MG/ML
1 INJECTION INTRAMUSCULAR ONCE
Status: COMPLETED | OUTPATIENT
Start: 2025-06-07 | End: 2025-06-07

## 2025-06-07 RX ORDER — ONDANSETRON 4 MG/1
4 TABLET, ORALLY DISINTEGRATING ORAL EVERY 6 HOURS PRN
Status: DISCONTINUED | OUTPATIENT
Start: 2025-06-07 | End: 2025-06-08 | Stop reason: HOSPADM

## 2025-06-07 RX ORDER — LORAZEPAM 2 MG/ML
.5-1 INJECTION INTRAMUSCULAR EVERY 4 HOURS PRN
Status: DISCONTINUED | OUTPATIENT
Start: 2025-06-07 | End: 2025-06-08 | Stop reason: HOSPADM

## 2025-06-07 RX ORDER — POLYETHYLENE GLYCOL 3350 17 G/17G
17 POWDER, FOR SOLUTION ORAL 2 TIMES DAILY PRN
Status: DISCONTINUED | OUTPATIENT
Start: 2025-06-07 | End: 2025-06-08 | Stop reason: HOSPADM

## 2025-06-07 RX ORDER — AMOXICILLIN 250 MG
1 CAPSULE ORAL 2 TIMES DAILY PRN
Status: DISCONTINUED | OUTPATIENT
Start: 2025-06-07 | End: 2025-06-08 | Stop reason: HOSPADM

## 2025-06-07 RX ORDER — SUCRALFATE 1 G/1
1 TABLET ORAL
Status: DISCONTINUED | OUTPATIENT
Start: 2025-06-07 | End: 2025-06-08 | Stop reason: HOSPADM

## 2025-06-07 RX ORDER — MAGNESIUM HYDROXIDE/ALUMINUM HYDROXICE/SIMETHICONE 120; 1200; 1200 MG/30ML; MG/30ML; MG/30ML
15 SUSPENSION ORAL 3 TIMES DAILY PRN
Status: DISCONTINUED | OUTPATIENT
Start: 2025-06-07 | End: 2025-06-08 | Stop reason: HOSPADM

## 2025-06-07 RX ORDER — DIPHENHYDRAMINE HYDROCHLORIDE 50 MG/ML
12.5 INJECTION, SOLUTION INTRAMUSCULAR; INTRAVENOUS ONCE
Status: COMPLETED | OUTPATIENT
Start: 2025-06-07 | End: 2025-06-07

## 2025-06-07 RX ADMIN — DIPHENHYDRAMINE HYDROCHLORIDE 12.5 MG: 50 INJECTION, SOLUTION INTRAMUSCULAR; INTRAVENOUS at 14:06

## 2025-06-07 RX ADMIN — ACETAMINOPHEN 650 MG: 325 TABLET ORAL at 21:07

## 2025-06-07 RX ADMIN — SODIUM CHLORIDE: 0.9 INJECTION, SOLUTION INTRAVENOUS at 21:01

## 2025-06-07 RX ADMIN — LORAZEPAM 1 MG: 2 INJECTION INTRAMUSCULAR; INTRAVENOUS at 11:26

## 2025-06-07 RX ADMIN — PANTOPRAZOLE SODIUM 80 MG: 40 INJECTION, POWDER, FOR SOLUTION INTRAVENOUS at 16:16

## 2025-06-07 RX ADMIN — SODIUM CHLORIDE 1000 ML: 0.9 INJECTION, SOLUTION INTRAVENOUS at 18:50

## 2025-06-07 RX ADMIN — SUCRALFATE 1 G: 1 TABLET ORAL at 18:29

## 2025-06-07 RX ADMIN — METOCLOPRAMIDE 10 MG: 5 INJECTION, SOLUTION INTRAMUSCULAR; INTRAVENOUS at 11:26

## 2025-06-07 RX ADMIN — SODIUM CHLORIDE 1000 ML: 0.9 INJECTION, SOLUTION INTRAVENOUS at 11:20

## 2025-06-07 RX ADMIN — SUCRALFATE 1 G: 1 TABLET ORAL at 22:08

## 2025-06-07 RX ADMIN — POTASSIUM CHLORIDE 40 MEQ: 20 TABLET, EXTENDED RELEASE ORAL at 16:16

## 2025-06-07 RX ADMIN — MAGNESIUM SULFATE HEPTAHYDRATE 2 G: 40 INJECTION, SOLUTION INTRAVENOUS at 21:01

## 2025-06-07 RX ADMIN — ONDANSETRON 4 MG: 2 INJECTION, SOLUTION INTRAMUSCULAR; INTRAVENOUS at 19:35

## 2025-06-07 RX ADMIN — HALOPERIDOL LACTATE 2 MG: 5 INJECTION, SOLUTION INTRAMUSCULAR at 14:06

## 2025-06-07 ASSESSMENT — COLUMBIA-SUICIDE SEVERITY RATING SCALE - C-SSRS
6. HAVE YOU EVER DONE ANYTHING, STARTED TO DO ANYTHING, OR PREPARED TO DO ANYTHING TO END YOUR LIFE?: NO
2. HAVE YOU ACTUALLY HAD ANY THOUGHTS OF KILLING YOURSELF IN THE PAST MONTH?: NO
1. IN THE PAST MONTH, HAVE YOU WISHED YOU WERE DEAD OR WISHED YOU COULD GO TO SLEEP AND NOT WAKE UP?: NO

## 2025-06-07 ASSESSMENT — ACTIVITIES OF DAILY LIVING (ADL)
ADLS_ACUITY_SCORE: 50
ADLS_ACUITY_SCORE: 44
ADLS_ACUITY_SCORE: 50
ADLS_ACUITY_SCORE: 50
ADLS_ACUITY_SCORE: 44
ADLS_ACUITY_SCORE: 50
ADLS_ACUITY_SCORE: 44
ADLS_ACUITY_SCORE: 50
ADLS_ACUITY_SCORE: 50
ADLS_ACUITY_SCORE: 44
ADLS_ACUITY_SCORE: 44

## 2025-06-07 NOTE — PROGRESS NOTES
"  Hector Mazariegos is a 19 year old female who is being evaluated via a billable video visit.      The patient has been notified of following at the time of scheduling video visit:     \"This video visit will be conducted via a video call between you and your physician/provider. We have found that certain health care needs can be provided without the need for a physical exam.  This service lets us provide the care you need with a video conversation.  If a prescription is necessary we can send it directly to your pharmacy.  If lab work is needed we can place an order for that and you can then stop by our lab to have the test done at a later time.\"   Patient has given consent for video visit?  YES    SUBJECTIVE:  Hector Mazariegos is an 19 year old female who presents for nausea, vomiting, abdominal pain, and chest pain.  Was recently admitted through the ER and was in the hospital for a few days for these sxs.  Was discharged two days ago and initially felt like she would do okay.  But having worsening n/v again and some increased chest and abdominal pain.  Does feel that she's anxious.  The trazodone she was prescribed at the hospital does help a little but not a lot.  Has epigastric and mid abdominal pain and some pain in mid chest.  Decreased urination over past day or so.  Has diarrhea sometimes when vomits.  No normal bms. No blood with BMs.  No fevers, chills, sweats.  Not eating or drinking much.  Has vomited three times in past 3 hours.  Has taken omeprazole as instructed and has used prn zofran but not seem to help.    PMH:   has no past medical history on file.  Patient Active Problem List   Diagnosis    INTOEING    Obesity    Other acne    Anxiety    Acute pulmonary embolism without acute cor pulmonale, unspecified pulmonary embolism type (H)    Acute gastritis without hemorrhage, unspecified gastritis type    Moderate episode of recurrent major depressive disorder (H)    Epigastric pain    Intractable " nausea and vomiting     Social History     Socioeconomic History    Marital status: Single   Tobacco Use    Smoking status: Never     Passive exposure: Never    Smokeless tobacco: Never   Vaping Use    Vaping status: Never Used   Substance and Sexual Activity    Alcohol use: No    Drug use: Yes     Types: Marijuana    Sexual activity: Never     Birth control/protection: None     Social Drivers of Health     Financial Resource Strain: Low Risk  (6/1/2025)    Financial Resource Strain     Within the past 12 months, have you or your family members you live with been unable to get utilities (heat, electricity) when it was really needed?: No   Food Insecurity: Low Risk  (6/1/2025)    Food Insecurity     Within the past 12 months, did you worry that your food would run out before you got money to buy more?: No     Within the past 12 months, did the food you bought just not last and you didn t have money to get more?: No   Transportation Needs: Low Risk  (6/1/2025)    Transportation Needs     Within the past 12 months, has lack of transportation kept you from medical appointments, getting your medicines, non-medical meetings or appointments, work, or from getting things that you need?: No   Social Connections: Socially Integrated (9/11/2024)    Received from [facility]    Social Connections     Do you often feel lonely or isolated from those around you?: 0   Interpersonal Safety: Low Risk  (6/1/2025)    Interpersonal Safety     Do you feel physically and emotionally safe where you currently live?: Yes     Within the past 12 months, have you been hit, slapped, kicked or otherwise physically hurt by someone?: No     Within the past 12 months, have you been humiliated or emotionally abused in other ways by your partner or ex-partner?: No   Housing Stability: Low Risk  (6/1/2025)    Housing Stability     Do you have housing? : Yes     Are you worried about losing your housing?: No     Family  History   Problem Relation Age of Onset    Diabetes Mother     Diabetes Brother     Alzheimer Disease Paternal Grandmother        ALLERGIES:  Patient has no known allergies.    Current Outpatient Medications   Medication Sig Dispense Refill    ondansetron (ZOFRAN ODT) 4 MG ODT tab Take 1 tablet (4 mg) by mouth every 6 hours as needed (Nausea and Vomiting). 30 tablet 0    pantoprazole (PROTONIX) 40 MG EC tablet Take 1 tablet (40 mg) by mouth every morning (before breakfast).      traZODone (DESYREL) 50 MG tablet Take 1 tablet (50 mg) by mouth at bedtime. 30 tablet 0     No current facility-administered medications for this visit.         ROS:  ROS is done and is negative for general/constitutional, eye, ENT, Respiratory, cardiovascular, GI, , Skin, musculoskeletal except as noted elsewhere.  All other review of systems negative except as noted elsewhere.      OBJECTIVE:    No vital signs obtained as is virtual visit    GENERAL: alert and no distress  EYES: Eyes grossly normal to inspection.  No discharge or erythema, or obvious scleral/conjunctival abnormalities.  RESP: No audible wheeze, cough, or visible cyanosis.    SKIN: Visible skin clear. No significant rash, abnormal pigmentation or lesions.  NEURO: Cranial nerves grossly intact.  Mentation and speech appropriate for age.  PSYCH: Appropriate affect, tone, and pace of words         ASSESSMENT/PLAN:    ASSESSMENT / PLAN:  (R11.2) Intractable nausea and vomiting  (primary encounter diagnosis)  (R10.84) Abdominal pain, generalized  (R07.9) Chest pain, unspecified type    Comment: sxs are same as what she had before and during recent hospitalization, but now sxs are worsening again after her recent hospital discharge.  Likely some dehydration. Having abdominal and chest pain as well  Plan: advised pt to go to the ER now for further eval and treatment, and likely IV fluids.  Pt indicates will go to ER shortly.        See [facility] for orders, medications, letters,  patient instructions    Yadi Sandy MD  6/7/2025, 9:24 AM    Video-Visit Details    Video Start Time: 9:30    Type of service:  Video Visit    Video End Time:9:44 AM    Originating Location (pt. Location): Home    Distant Location (provider location):  [facility]     Platform used for Video Visit: Well

## 2025-06-07 NOTE — PHARMACY-ADMISSION MEDICATION HISTORY
Pharmacy Intern Admission Medication History    Admission medication history is complete. The information provided in this note is only as accurate as the sources available at the time of the update.    Information Source(s): Patient via in-person    Pertinent Information: NA    Changes made to PTA medication list:  Added: omeprazole   Deleted: pantoprazole  Changed: None    Allergies reviewed with patient and updates made in EHR: yes    Medication History Completed By: Juliana Cassidy 6/7/2025 5:24 PM    PTA Med List   Medication Sig Last Dose/Taking    omeprazole (PRILOSEC) 20 MG DR capsule Take 20 mg by mouth daily. 6/6/2025 Morning    ondansetron (ZOFRAN ODT) 4 MG ODT tab Take 1 tablet (4 mg) by mouth every 6 hours as needed (Nausea and Vomiting). Taking As Needed    traZODone (DESYREL) 50 MG tablet Take 1 tablet (50 mg) by mouth at bedtime. 6/6/2025 Evening

## 2025-06-07 NOTE — ED TRIAGE NOTES
Patient presents to ED d/t abd pain, chest pain and vomiting. Onset of symptoms developed on 6/1/2025 and was admitted    Reports she has been more anxious since her brother passed away. Reports having vomiting multiple times today along with diarrhea and  then developed chest pain. C/o feeling lightheaded intermittently.     Hx THC use, H pylori       Appears to be anxious

## 2025-06-07 NOTE — ED PROVIDER NOTES
Emergency Department Note      History of Present Illness     Chief Complaint   Vomiting      HPI   Hector Mazariegos is a 19 year old female with past medical history significant for generalized anxiety disorder and pulmonary embolism of left lung who presents via car from home accompanied by mother with chief complant of vomiting. The patient reports abdominal pain since 05/28/25 with episodes of vomiting once every hour, occasional diarrhea, and cold sweats. No hematemesis. She also reports intermittent chest pain in the center of her chest. She describes it as a heaviness. She states these episodes of chest pain cause her to hyperventilate. She has a history of anxiety and panic attacks but this chest pain is different. No urinary symptoms. No hematochezia or melena. History of blood clots due to breast reduction. She denies history of gallbladder disease. She endorses THC use 2-3 weeks ago. Of note, she recently lost her brother. She is seen by a therapist.     Independent Historian   None    Review of External Notes   I reviewed the hospital admission note from 06/01/25.     Past Medical History     Medical History and Problem List   Acute gastritis  Anxiety  Depression  Distension of hepatic veins and IVC  H. Pylori   IBS  Iron deficiency anemia   Macromastia   Obesity  PE involving right lung  Sinus bradycardia  Vitamin D deficiency      Medications   Achromycin  Aldactone   Carafate  Compazine   Diflucan  Flagyl  Imodium   Prilosec  Protonix   Reglan   Zofran     Surgical History   Bilateral breast reduction  EGD with biopsy      Physical Exam     Patient Vitals for the past 24 hrs:   BP Temp Temp src Pulse Resp SpO2 Height Weight   06/07/25 1545 106/55 -- -- 108 -- -- -- --   06/07/25 1530 122/72 -- -- 92 -- -- -- --   06/07/25 1515 124/79 -- -- 76 -- -- -- --   06/07/25 1500 123/61 -- -- 74 -- -- -- --   06/07/25 1445 132/69 -- -- 72 -- -- -- --   06/07/25 1430 132/77 -- -- 66 -- -- -- --   06/07/25 1415  "120/84 -- -- 84 -- 100 % -- --   06/07/25 1345 124/79 -- -- 62 -- 100 % -- --   06/07/25 1330 112/71 -- -- 72 -- 100 % -- --   06/07/25 1315 111/55 -- -- 63 -- 100 % -- --   06/07/25 1300 109/62 -- -- 65 -- 99 % -- --   06/07/25 1245 104/61 -- -- 73 -- 99 % -- --   06/07/25 1230 119/61 -- -- 67 -- 100 % -- --   06/07/25 1046 -- -- -- 88 -- -- -- --   06/07/25 1043 (!) 113/90 -- -- -- -- -- -- --   06/07/25 1038 -- 97.8  F (36.6  C) Temporal (!) 123 22 97 % 1.753 m (5' 9\") 109.5 kg (241 lb 6.5 oz)     Physical Exam  Constitutional: Well developed, somewhat forlorn, nontox appearance  Head: Atraumatic.   Mouth/Throat: Oropharynx is clear and moist.   Neck:  no stridor  Eyes: no scleral icterus  Cardiovascular: RRR, 2+ bilat radial pulses  Pulmonary/Chest: nml resp effort, Clear BS bilat  Abdominal: ND, soft, epigastric tenderness, no rebound or guarding   Ext: Warm, well perfused, no edema  Neurological: A&O, symmetric facies, moves ext x4  Skin: Skin is warm and dry.   Psychiatric: Mildly anxious and tearful  Nursing note and vitals reviewed.      Diagnostics     Lab Results   Labs Ordered and Resulted from Time of ED Arrival to Time of ED Departure   COMPREHENSIVE METABOLIC PANEL - Abnormal       Result Value    Sodium 136      Potassium 3.3 (*)     Carbon Dioxide (CO2) 23      Anion Gap 17 (*)     Urea Nitrogen 9.5      Creatinine 0.74      GFR Estimate >90      Calcium 9.3      Chloride 96 (*)     Glucose 79      Alkaline Phosphatase 81      AST 20      ALT 20      Protein Total 7.8      Albumin 4.5      Bilirubin Total 0.8     CBC WITH PLATELETS AND DIFFERENTIAL - Abnormal    WBC Count 8.2      RBC Count 5.53 (*)     Hemoglobin 12.0      Hematocrit 39.6      MCV 72 (*)     MCH 21.7 (*)     MCHC 30.3 (*)     RDW 17.6 (*)     Platelet Count 539 (*)     % Neutrophils 73      % Lymphocytes 21      % Monocytes 6      % Eosinophils 0      % Basophils 0      % Immature Granulocytes 0      NRBCs per 100 WBC 0      " Absolute Neutrophils 6.0      Absolute Lymphocytes 1.7      Absolute Monocytes 0.5      Absolute Eosinophils 0.0      Absolute Basophils 0.0      Absolute Immature Granulocytes 0.0      Absolute NRBCs 0.0     LIPASE - Normal    Lipase 39     HCG QUALITATIVE PREGNANCY - Normal    hCG Serum Qualitative Negative     TROPONIN T, HIGH SENSITIVITY - Normal    Troponin T, High Sensitivity <6         Imaging   No orders to display       EKG   ECG taken at 1048, ECG read at 1054  Normal sinus rhythm with sinus arrhythmia  Normal ecg   No change as compared to prior, dated 06/01/2025.  Rate 62 bpm. NH interval 128 ms. QRS duration 80 ms. QT/QTc 444/450 ms. P-R-T axes 31 49 50.    Independent Interpretation   EKG as noted above    ED Course      Medications Administered   Medications   potassium chloride anson ER (KLOR-CON M20) CR tablet 40 mEq (has no administration in time range)   pantoprazole (PROTONIX) injection 80 mg (has no administration in time range)   sodium chloride 0.9% BOLUS 1,000 mL (0 mLs Intravenous Stopped 6/7/25 1403)   LORazepam (ATIVAN) injection 1 mg (1 mg Intravenous $Given 6/7/25 1126)   metoclopramide (REGLAN) injection 10 mg (10 mg Intravenous $Given 6/7/25 1126)   alum & mag hydroxide-simethicone (MAALOX) suspension 15 mL (15 mLs Oral Not Given 6/7/25 1126)   lidocaine (viscous) (XYLOCAINE) 2 % solution 15 mL (15 mLs Mouth/Throat Not Given 6/7/25 1127)   haloperidol lactate (HALDOL) injection 2 mg (2 mg Intravenous $Given 6/7/25 1406)   diphenhydrAMINE (BENADRYL) injection 12.5 mg (12.5 mg Intravenous $Given 6/7/25 1406)       Procedures   Procedures     Discussion of Management   Admitting Hospitalist, Sarah Parisi PA-C    ED Course   ED Course as of 06/07/25 1614   Sat Jun 07, 2025   1049 I obtained history and examined the patient as noted above.    1355 I rechecked and updated the patient.    1544 I rechecked and updated the patient.    1607 I spoke with Sarah Parisi PA-C, hospitalist, who  accepted the patient for admission.        Additional Documentation  Social determinants of health include THC use.     Medical Decision Making / Diagnosis     CMS Diagnoses: None    MIPS   None               MDM   Hector Mazariegos is a 19 year old female presenting with abdominal pain, vomiting, chest pain with vomiting    Differential diagnosis includes gastritis, peptic ulcer disease, pancreatitis, hepatitis, biliary colic, cholecystitis, cholelithiasis, choledocholithiasis, cyclic vomiting syndrome, cannabinoid hyperemesis less likely given the patient has not used marijuana in the last 3 weeks per her report.  In consideration of review of CT from 6/4/25, less likely biliary etiology given no evidence of gallstones.  Doubt atypical ACS, dissection, PE given symptomatology, physical exam.  Labs significant for mild hypokalemia, elevated anion gap without acidosis probably secondary to ketones and starvation ketosis.  Multiple medications given as noted above for symptom control.  Patient continued to vomit in the emergency department and was later given Haldol with improvement and is able to tolerate some liquids.  Pantoprazole given as noted above.  Given persistent pain, nausea, here for continued vomiting at home, patient is admitted to the hospitalist service under observation status for further hydration.  Patient and family counseled on all results, disposition and diagnosis.  They are understanding and agreeable to plan. Patient admitted in stable condition.        Disposition   The patient was admitted to the hospital.     Diagnosis     ICD-10-CM    1. Nausea and vomiting, unspecified vomiting type  R11.2       2. Hypokalemia  E87.6       3. Acute gastritis without hemorrhage, unspecified gastritis type  K29.00            Discharge Medications   New Prescriptions    No medications on file     Scribe Disclosure:  Bandar HERNANDEZ, am serving as a scribe at 10:50 AM on 6/7/2025 to document services personally  performed by Trent Box MD based on my observations and the provider's statements to me.        Trent Box MD  06/07/25 [id number]

## 2025-06-07 NOTE — H&P
[facility]  Internal Medicine  History and Physical      Patient Name: Hector Mazariegos MRN# [id number]   Age: 19 year old YOB: 2005     Date of Admission:6/7/2025    Primary care provider: No Ref-Primary, Physician  Date of Service: 6/7/2025         Assessment and Plan:   Hector Mazariegos is a 19 year old female with a history of Anxiety, Obesity, Pulmonary Embolism after surgery, BENJAMIN, GERD, H.Pylori, Marijuana Use who presents to the ED today with recurrent nausea and emesis.    Patient reports she has been struggling with abdominal pain, nausea and emesis since 5/28/25.  She was most recently hospitalized 6/1/25-6/5/25 with abdominal pain, nausea and emesis with negative CT scan.  She has a history of H.Pylori treated in the past as well as an EGD in February which showed chronic gastritis.  She reports she does not take the PPI prescribed to her regularly.  She reports she has only used marijuana once 3 weeks ago.  Denies any blood in her emesis or stools.  Last BM was yesterday.      Recurrent Nausea/Emesis  Possible Cannabinoid Induced Hyperemesis  Hx Untreated Chronic Gastritis  Hx H.Pylori  Hemodynamically stable, afebrile on room air.  Laboratory work up thus far reveals potassium 3.3, AG 17, hgb 12.  No imaging was obtained today.  Patient has had multiple ED visits in 2025 for abdominal pain, nausea and emesis. In the past she had undergone extensive workup with US, CT abdomen and pelvis, EGD 2/2025 with biopsies which showed chronic gastritis for which she is not taking a PPI regularly.  Treated for H.Pylori in 2024. Most recently hospitalized 6/1/25-6/5/25 with abdominal pain, nausea and emesis with negative CT scan.  - clear liquids, IVF, antiemetics, analgesics prn  - IV Protonix and discussed with patient she should take a PPI daily as prescribed to treat the chronic gastritis noted on her EGD earlier this year.  - marijuana cessation encouraged  - h.pylori stool and  enteric panel ordered  - follow up outpatient with GI    Mild Hypokalemia  Mild Hypochloremia  Potassium 3.3 d/t GI losses and poor oral intake  - IVF hydration and replace per protocol      Chronic Medical Problems  Hx PE - provoked at the time of a prior surgery.  No longer on blood thinners  Anxiety - prescribed Trazodone last hospital stay which she does not want to use.  Obesity, BMI 35 - encourage weight loss and health lifestyle      CODE: full  Diet/IVF: clear liquids, NS  GI ppx:  protonix  DVT ppx: scd  Medically Ready for Discharge: Anticipated Tomorrow        Sarah Bruno MS PA-C  Physician Assistant   Hospitalist Service                Chief Complaint:   Vomiting         HPI:   19 year old female with a history of Anxiety, Obesity, Pulmonary Embolism after surgery, BENJAMIN, GERD, H.Pylori, Marijuana Use who presents to the ED today with recurrent nausea and emesis.    History obtained from patient, chart review and verbal discussion with the ED provider.    Patient reports she has been struggling with abdominal pain, nausea and emesis since 5/28/25.  She was most recently hospitalized 6/1/25-6/5/25 with abdominal pain, nausea and emesis with negative CT scan.  She has a history of H.Pylori treated in the past as well as an EGD in February which showed chronic gastritis.  She reports she does not take the PPI prescribed to her regularly.  She reports she has only used marijuana once 3 weeks ago.  Denies any blood in her emesis or stools.  Last BM was yesterday.  Denies any alcohol use.      Chart Review  Admission in September 2024 for intractable vomiting that was felt potentially due to cannabinoid induced hyperemesis and THC Gummies.  Also found to be positive for H.Pylori 9/2024.  Patient has had multiple ED visits in 2025 for abdominal pain, nausea and emesis.  In the past she had undergone extensive workup with US, CT abdomen and pelvis, even EGD 2/2025 with biopsies which showed chronic gastritis.         Past Medical History:   Anxiety, Obesity, Pulmonary Embolism after surgery, BENJAMIN, GERD, H.Pylori, Marijuana Use       Past Surgical History:     Past Surgical History:   Procedure Laterality Date    ESOPHAGOSCOPY, GASTROSCOPY, DUODENOSCOPY (EGD), COMBINED N/A 2/26/2025    Procedure: ESOPHAGOGASTRODUODENOSCOPY, WITH BIOPSY;  Surgeon: Warren Kevin MD;  Location: [facility]          Social History:     Social History     Socioeconomic History    Marital status: Single     Spouse name: Not on file    Number of children: Not on file    Years of education: Not on file    Highest education level: Not on file   Occupational History    Not on file   Tobacco Use    Smoking status: Never     Passive exposure: Never    Smokeless tobacco: Never   Vaping Use    Vaping status: Never Used   Substance and Sexual Activity    Alcohol use: No    Drug use: Yes     Types: Marijuana    Sexual activity: Never     Birth control/protection: None   Other Topics Concern    Not on file   Social History Narrative    Not on file     Social Drivers of Health     Financial Resource Strain: Low Risk  (6/1/2025)    Financial Resource Strain     Within the past 12 months, have you or your family members you live with been unable to get utilities (heat, electricity) when it was really needed?: No   Food Insecurity: Low Risk  (6/1/2025)    Food Insecurity     Within the past 12 months, did you worry that your food would run out before you got money to buy more?: No     Within the past 12 months, did the food you bought just not last and you didn t have money to get more?: No   Transportation Needs: Low Risk  (6/1/2025)    Transportation Needs     Within the past 12 months, has lack of transportation kept you from medical appointments, getting your medicines, non-medical meetings or appointments, work, or from getting things that you need?: No   Physical Activity: Not on file   Stress: Not on file   Social Connections: Socially Integrated  "(9/11/2024)    Received from [facility]    Social Connections     Do you often feel lonely or isolated from those around you?: 0   Interpersonal Safety: Low Risk  (6/1/2025)    Interpersonal Safety     Do you feel physically and emotionally safe where you currently live?: Yes     Within the past 12 months, have you been hit, slapped, kicked or otherwise physically hurt by someone?: No     Within the past 12 months, have you been humiliated or emotionally abused in other ways by your partner or ex-partner?: No   Housing Stability: Low Risk  (6/1/2025)    Housing Stability     Do you have housing? : Yes     Are you worried about losing your housing?: No          Family History:     Family History   Problem Relation Age of Onset    Diabetes Mother     Diabetes Brother     Alzheimer Disease Paternal Grandmother           Allergies:    No Known Allergies       Medications:     Prior to Admission medications    Medication Sig Last Dose Taking? Auth Provider Long Term End Date   ondansetron (ZOFRAN ODT) 4 MG ODT tab Take 1 tablet (4 mg) by mouth every 6 hours as needed (Nausea and Vomiting).   Negrito Valero MD     pantoprazole (PROTONIX) 40 MG EC tablet Take 1 tablet (40 mg) by mouth every morning (before breakfast).   Negrito Valero MD     traZODone (DESYREL) 50 MG tablet Take 1 tablet (50 mg) by mouth at bedtime.   Negrito Valero MD Yes           Review of Systems:   A complete ROS was performed and is negative other than what is stated in the HPI.       Physical Exam:   Blood pressure 106/55, pulse 108, temperature 97.8  F (36.6  C), temperature source Temporal, resp. rate 22, height 1.753 m (5' 9\"), weight 109.5 kg (241 lb 6.5 oz), last menstrual period 05/06/2025, SpO2 100%, not currently breastfeeding.  General: Alert, interactive, NAD, lying in bed with family at the bedside  HEENT: AT/NC  Chest/Resp: clear to auscultation bilaterally, no crackles or wheezes  Heart/CV: regular rate " and rhythm, no murmur  Abdomen/GI: Soft, nontender, nondistended. +BS.  No rebound or guarding.  Extremities/MSK: No LE edema  Skin: Warm and dry  Neuro: Alert & oriented x 3, no focal deficits, moves all extremities equally         Labs:   ROUTINE ICU LABS (Last four results)  CMP  Recent Labs   Lab 06/07/25  1116 06/05/25  0528 06/04/25  1118 06/03/25  2342 06/03/25  0629 06/02/25  1125 06/01/25  1733    136 135  --   --   --  138   POTASSIUM 3.3* 3.6 3.9 3.5   < > 3.5 3.4   CHLORIDE 96* 102 103  --   --   --  99   CO2 23 20* 19*  --   --   --  21*   ANIONGAP 17* 14 13  --   --   --  18*   GLC 79 71 75  --   --   --  91   BUN 9.5 4.6* 4.2*  --   --   --  12.1   CR 0.74 0.63 0.58  --   --   --  0.74   GFRESTIMATED >90 >90 >90  --   --   --  >90   DANIELLA 9.3 8.4* 8.6*  --   --   --  8.8   MAG  --  1.7  --   --   --  2.0 1.9   PROTTOTAL 7.8  --  6.8  --   --   --  7.7   ALBUMIN 4.5  --  4.1  --   --   --  4.5   BILITOTAL 0.8  --  0.5  --   --   --  0.5   ALKPHOS 81  --  75  --   --   --  87   AST 20  --  20  --   --   --  26   ALT 20  --  24  --   --   --  25    < > = values in this interval not displayed.     CBC  Recent Labs   Lab 06/07/25 1116 06/04/25  1118 06/01/25  1721   WBC 8.2 7.1 7.9   RBC 5.53* 4.85 5.08   HGB 12.0 10.6* 11.1*   HCT 39.6 35.1 37.0   MCV 72* 72* 73*   MCH 21.7* 21.9* 21.9*   MCHC 30.3* 30.2* 30.0*   RDW 17.6* 16.8* 17.3*   * 456* 531*          Imaging/Procedures:     Results for orders placed or performed during the hospital encounter of 06/01/25   XR Chest 2 Views    Narrative    EXAM: XR CHEST 2 VIEWS  LOCATION: [facility]  DATE: 6/1/2025    INDICATION: syncope, heart burn chest pain  COMPARISON: PA and lateral views of the chest 5/31/2024      Impression    IMPRESSION: Negative chest.   CT Abdomen Pelvis w Contrast    Narrative    EXAM: CT ABDOMEN AND PELVIS WITH CONTRAST  LOCATION: [facility]  DATE/TIME: 6/4/2025 6:17 PM  CDT    INDICATION: Nausea and vomiting.  COMPARISON: 1/20/2025.    TECHNIQUE: CT scan of the abdomen and pelvis was performed following injection of IV contrast. Multiplanar reformats were obtained. Dose reduction techniques were used.  CONTRAST: 100 mL Isovue-370.    FINDINGS:    LOWER CHEST: Unremarkable.    HEPATOBILIARY: Unremarkable.    SPLEEN: Unremarkable.    PANCREAS: Unremarkable.    ADRENAL GLANDS: Unremarkable.    KIDNEYS/BLADDER: Unremarkable.    BOWEL: The stomach, small and large bowel are normal in caliber. Normal appendix. No bowel wall thickening, pneumatosis or free intraperitoneal gas.    LYMPH NODES: Unremarkable.    PELVIC ORGANS: No acute findings.    MUSCULOSKELETAL: No acute findings.    OTHER: A very small amount of free fluid in the pelvis.      Impression    IMPRESSION:   1.  A very small amount of free fluid in the pelvis. This is nonspecific, but could relate to a ruptured ovarian cyst.  2.  No other acute abnormality identified in the abdomen or pelvis. Normal appendix.

## 2025-06-07 NOTE — ED NOTES
[facility]  ED Nurse Handoff Report    ED Chief complaint: Vomiting  . ED Diagnosis:   Final diagnoses:   Nausea and vomiting, unspecified vomiting type   Hypokalemia   Acute gastritis without hemorrhage, unspecified gastritis type       Allergies: No Known Allergies    Code Status: Full Code    Activity level - Baseline/Home:  independent.  Activity Level - Current:   independent.   Lift room needed: No.   Bariatric: No   Needed: No   Isolation: No.   Infection: Not Applicable.     Respiratory status: Room air    Vital Signs (within 30 minutes):   Vitals:    06/07/25 1500 06/07/25 1515 06/07/25 1530 06/07/25 1545   BP: 123/61 124/79 122/72 106/55   Pulse: 74 76 92 108   Resp:       Temp:       TempSrc:       SpO2:       Weight:       Height:           Cardiac Rhythm:  ,      Pain level:    Patient confused: No.   Patient Falls Risk: patient and family education.   Elimination Status: Has voided     Patient Report - Initial Complaint: nausea and vomiting .   Focused Assessment: Hector Mazariegos is a 19 year old female with past medical history significant for generalized anxiety disorder and pulmonary embolism of left lung who presents via car from home accompanied by mother with chief complant of vomiting. The patient reports abdominal pain since 05/28/25 with episodes of vomiting once every hour, occasional diarrhea, and cold sweats. She also reports intermittent chest pain in the center of her chest. She describes it as a heaviness. She states these episodes of chest pain cause her to hyperventilate. She has a history of anxiety and panic attacks but this chest pain is different. No urinary symptoms. No hematochezia or melena. History of blood clots due to breast reduction. She denies history of gallbladder disease. Of note, she recently lost her brother. She is seen by a therapist.     Abnormal Results:   Labs Ordered and Resulted from Time of ED Arrival to Time of ED Departure    COMPREHENSIVE METABOLIC PANEL - Abnormal       Result Value    Sodium 136      Potassium 3.3 (*)     Carbon Dioxide (CO2) 23      Anion Gap 17 (*)     Urea Nitrogen 9.5      Creatinine 0.74      GFR Estimate >90      Calcium 9.3      Chloride 96 (*)     Glucose 79      Alkaline Phosphatase 81      AST 20      ALT 20      Protein Total 7.8      Albumin 4.5      Bilirubin Total 0.8     CBC WITH PLATELETS AND DIFFERENTIAL - Abnormal    WBC Count 8.2      RBC Count 5.53 (*)     Hemoglobin 12.0      Hematocrit 39.6      MCV 72 (*)     MCH 21.7 (*)     MCHC 30.3 (*)     RDW 17.6 (*)     Platelet Count 539 (*)     % Neutrophils 73      % Lymphocytes 21      % Monocytes 6      % Eosinophils 0      % Basophils 0      % Immature Granulocytes 0      NRBCs per 100 WBC 0      Absolute Neutrophils 6.0      Absolute Lymphocytes 1.7      Absolute Monocytes 0.5      Absolute Eosinophils 0.0      Absolute Basophils 0.0      Absolute Immature Granulocytes 0.0      Absolute NRBCs 0.0     LIPASE - Normal    Lipase 39     HCG QUALITATIVE PREGNANCY - Normal    hCG Serum Qualitative Negative     TROPONIN T, HIGH SENSITIVITY - Normal    Troponin T, High Sensitivity <6          No orders to display       Treatments provided: zofran fluids haldol benedryl   Family Comments: at bedside  OBS brochure/video discussed/provided to patient:  yes  ED Medications:   Medications   potassium chloride anson ER (KLOR-CON M20) CR tablet 40 mEq (has no administration in time range)   pantoprazole (PROTONIX) injection 80 mg (has no administration in time range)   sodium chloride 0.9% BOLUS 1,000 mL (0 mLs Intravenous Stopped 6/7/25 1403)   LORazepam (ATIVAN) injection 1 mg (1 mg Intravenous $Given 6/7/25 1126)   metoclopramide (REGLAN) injection 10 mg (10 mg Intravenous $Given 6/7/25 1126)   alum & mag hydroxide-simethicone (MAALOX) suspension 15 mL (15 mLs Oral Not Given 6/7/25 1126)   lidocaine (viscous) (XYLOCAINE) 2 % solution 15 mL (15 mLs  Mouth/Throat Not Given 6/7/25 1124)   haloperidol lactate (HALDOL) injection 2 mg (2 mg Intravenous $Given 6/7/25 1402)   diphenhydrAMINE (BENADRYL) injection 12.5 mg (12.5 mg Intravenous $Given 6/7/25 2985)       Drips infusing:  No  For the majority of the shift this patient was Green.   Interventions performed were NA.    Sepsis treatment initiated: No    Cares/treatment/interventions/medications to be completed following ED care: monitor     ED Nurse Name: Dalia Canales RN  4:06 PM     RECEIVING UNIT ED HANDOFF REVIEW    Above ED Nurse Handoff Report was reviewed: Yes  Reviewed by: Iliana Gilliam RN on June 7, 2025 at 6:05 PM

## 2025-06-08 VITALS
HEART RATE: 63 BPM | WEIGHT: 242.2 LBS | RESPIRATION RATE: 18 BRPM | TEMPERATURE: 98.5 F | BODY MASS INDEX: 35.87 KG/M2 | DIASTOLIC BLOOD PRESSURE: 56 MMHG | HEIGHT: 69 IN | OXYGEN SATURATION: 99 % | SYSTOLIC BLOOD PRESSURE: 112 MMHG

## 2025-06-08 LAB
ANION GAP SERPL CALCULATED.3IONS-SCNC: 12 MMOL/L (ref 7–15)
BUN SERPL-MCNC: 5 MG/DL (ref 6–20)
CALCIUM SERPL-MCNC: 8 MG/DL (ref 8.8–10.4)
CHLORIDE SERPL-SCNC: 105 MMOL/L (ref 98–107)
CREAT SERPL-MCNC: 0.67 MG/DL (ref 0.51–0.95)
EGFRCR SERPLBLD CKD-EPI 2021: >90 ML/MIN/1.73M2
GLUCOSE SERPL-MCNC: 86 MG/DL (ref 70–99)
HCO3 SERPL-SCNC: 23 MMOL/L (ref 22–29)
HOLD SPECIMEN: NORMAL
MAGNESIUM SERPL-MCNC: 1.9 MG/DL (ref 1.7–2.3)
PHOSPHATE SERPL-MCNC: 3.2 MG/DL (ref 2.5–4.5)
POTASSIUM SERPL-SCNC: 3.5 MMOL/L (ref 3.4–5.3)
SODIUM SERPL-SCNC: 140 MMOL/L (ref 135–145)

## 2025-06-08 PROCEDURE — 96376 TX/PRO/DX INJ SAME DRUG ADON: CPT

## 2025-06-08 PROCEDURE — 96366 THER/PROPH/DIAG IV INF ADDON: CPT

## 2025-06-08 PROCEDURE — 250N000011 HC RX IP 250 OP 636: Performed by: PHYSICIAN ASSISTANT

## 2025-06-08 PROCEDURE — 250N000013 HC RX MED GY IP 250 OP 250 PS 637: Performed by: PHYSICIAN ASSISTANT

## 2025-06-08 PROCEDURE — 99238 HOSP IP/OBS DSCHRG MGMT 30/<: CPT | Performed by: PHYSICIAN ASSISTANT

## 2025-06-08 PROCEDURE — 36415 COLL VENOUS BLD VENIPUNCTURE: CPT | Performed by: PHYSICIAN ASSISTANT

## 2025-06-08 PROCEDURE — 84100 ASSAY OF PHOSPHORUS: CPT | Performed by: STUDENT IN AN ORGANIZED HEALTH CARE EDUCATION/TRAINING PROGRAM

## 2025-06-08 PROCEDURE — 83735 ASSAY OF MAGNESIUM: CPT | Performed by: STUDENT IN AN ORGANIZED HEALTH CARE EDUCATION/TRAINING PROGRAM

## 2025-06-08 PROCEDURE — G0378 HOSPITAL OBSERVATION PER HR: HCPCS

## 2025-06-08 PROCEDURE — 250N000013 HC RX MED GY IP 250 OP 250 PS 637: Performed by: STUDENT IN AN ORGANIZED HEALTH CARE EDUCATION/TRAINING PROGRAM

## 2025-06-08 PROCEDURE — 80048 BASIC METABOLIC PNL TOTAL CA: CPT | Performed by: PHYSICIAN ASSISTANT

## 2025-06-08 RX ORDER — SUCRALFATE 1 G/1
1 TABLET ORAL
Qty: 56 TABLET | Refills: 0 | Status: SHIPPED | OUTPATIENT
Start: 2025-06-08 | End: 2025-06-09

## 2025-06-08 RX ORDER — MAGNESIUM OXIDE 400 MG/1
400 TABLET ORAL EVERY 4 HOURS
Status: DISCONTINUED | OUTPATIENT
Start: 2025-06-08 | End: 2025-06-08 | Stop reason: HOSPADM

## 2025-06-08 RX ORDER — POTASSIUM CHLORIDE 1500 MG/1
20 TABLET, EXTENDED RELEASE ORAL ONCE
Status: COMPLETED | OUTPATIENT
Start: 2025-06-08 | End: 2025-06-08

## 2025-06-08 RX ADMIN — SUCRALFATE 1 G: 1 TABLET ORAL at 07:47

## 2025-06-08 RX ADMIN — Medication 400 MG: at 07:47

## 2025-06-08 RX ADMIN — PANTOPRAZOLE SODIUM 40 MG: 40 INJECTION, POWDER, FOR SOLUTION INTRAVENOUS at 07:47

## 2025-06-08 RX ADMIN — POTASSIUM CHLORIDE 20 MEQ: 1500 TABLET, EXTENDED RELEASE ORAL at 07:47

## 2025-06-08 ASSESSMENT — ACTIVITIES OF DAILY LIVING (ADL)
ADLS_ACUITY_SCORE: 46

## 2025-06-08 NOTE — DISCHARGE SUMMARY
"[facility]  Hospitalist Discharge Summary      Date of Admission:  6/7/2025  Date of Discharge:  6/8/2025  Discharging Provider: Vanesa Sapp PA-C  Discharge Service: Hospitalist Service    Discharge Diagnoses   Acute on chronic gastritis  Nausea and vomiting  Hypokalemia     Clinically Significant Risk Factors     # Obesity: Estimated body mass index is 35.77 kg/m  as calculated from the following:    Height as of this encounter: 1.753 m (5' 9\").    Weight as of this encounter: 109.9 kg (242 lb 3.2 oz).       Follow-ups Needed After Discharge   Follow-up Appointments       Hospital to Primary Care - Establish PCP Referral      Please be aware that coverage of these services is subject to the terms and limitations of your health insurance plan.  Call member services at your health plan with any benefit or coverage questions.    Schedule Primary Care visit within: 14 Days               Unresulted Labs Ordered in the Past 30 Days of this Admission       Date and Time Order Name Status Description    6/7/2025  4:58 PM Helicobacter pylori Antigen Stool In process         These results will be followed up by PCP    Discharge Disposition   Discharged to home  Condition at discharge: Stable    Hospital Course   Hector Mazariegos is a 19 year old female with a history of Anxiety, Obesity, Pulmonary Embolism after surgery, BENJAMIN, GERD, H.Pylori, Marijuana Use who was admitted on 6/7/2025 with recurrent nausea and emesis.     Patient reports she has been struggling with abdominal pain, nausea and emesis since 5/28/25.  She was most recently hospitalized 6/1/25-6/5/25 with abdominal pain, nausea and emesis with negative CT scan.  She has a history of H.Pylori treated in the past as well as an EGD in February which showed chronic gastritis.  She reports she does not take the prescribed PPI regularly.  She reports she has only used marijuana once 3 weeks ago.  Denies any blood in her emesis or stools.  " Last BM was yesterday.      Recurrent Nausea/Emesis  Possible Cannabinoid Induced Hyperemesis  Hx Untreated Chronic Gastritis  Hx H.Pylori  VSS, afebrile. Hypokalemia noted on ED labs, resolved. Anion gap 17, resolved. No imaging was obtained.  Patient has had multiple ED visits in 2025 for abdominal pain, nausea and emesis. In the past she had undergone extensive workup with US, CT abdomen and pelvis, EGD 2/2025 with biopsies which showed chronic gastritis for which she is not taking her prescribed PPI regularly.  Treated for H.Pylori in 2024. Most recently hospitalized 6/1/25-6/5/25 with abdominal pain, nausea and emesis with negative CT scan.  Sx improved overnight, tolerating clears. Labs improved.   - Advanced diet as tolerated today  - given IV Protonix here, encouraged to continue PPI on discharge daily  - carafate given on discharge, take before meals until sx are significantly improved  - marijuana cessation encouraged  - enteric panel negative  - h.pylori stool pending at discharge  - follow up outpatient with GI     Mild Hypokalemia  Mild Hypochloremia  Potassium 3.3 d/t GI losses and poor oral intake  - IVF hydration and replaced per protocol  - resolved      Hx PE - provoked at the time of a prior surgery.  No longer on blood thinners  Anxiety - prescribed Trazodone last hospital stay which she does not want to use.  Obesity, BMI 35 - encourage weight loss and health lifestyle      Consultations This Hospital Stay   None    Code Status   Full Code    Time Spent on this Encounter   I, Vanesa Sapp PA-C, personally saw the patient today and spent less than or equal to 30 minutes discharging this patient.       Vanesa Sapp PA-C  [facility]  [address] [place] [address]  Phone: [phone]  Fax: [phone]  ______________________________________________________________________    Physical Exam   Vital Signs: Temp: 98.5  F (36.9  C) Temp src: Oral  BP: 112/56 Pulse: 63   Resp: 18 SpO2: 99 % O2 Device: None (Room air)    Weight: 242 lbs 3.2 oz    GENERAL:  Comfortable.  PSYCH: pleasant, oriented, No acute distress.  HEART:  RRR  LUNGS:  Normal Respiratory effort.   NEUROLOGIC:  grossly intact        Primary Care Physician   Physician No Ref-Primary    Discharge Orders      Hospital to Primary Care - Establish PCP Referral      Reason for your hospital stay    Nausea and vomiting due to chronic gastritis.     Activity    Your activity upon discharge: activity as tolerated     Discharge Instructions    Take Omeprazole every day  Take Carafate before every meal for 1-2 weeks until symptoms are significantly improved     Diet    Follow this diet upon discharge: Regular diet as tolerated. Avoid acidic foods and spicy foods       Significant Results and Procedures   Most Recent 3 CBC's:  Recent Labs   Lab Test 06/07/25 1116 06/04/25  1118 06/01/25  1721   WBC 8.2 7.1 7.9   HGB 12.0 10.6* 11.1*   MCV 72* 72* 73*   * 456* 531*     Most Recent 3 BMP's:  Recent Labs   Lab Test 06/08/25  0527 06/07/25 2009 06/07/25 1116 06/05/25  0528     --  136 136   POTASSIUM 3.5 3.4 3.3* 3.6   CHLORIDE 105  --  96* 102   CO2 23  --  23 20*   BUN 5.0*  --  9.5 4.6*   CR 0.67  --  0.74 0.63   ANIONGAP 12  --  17* 14   DANIELLA 8.0*  --  9.3 8.4*   GLC 86  --  79 71     Most Recent 2 LFT's:  Recent Labs   Lab Test 06/07/25 1116 06/04/25  1118   AST 20 20   ALT 20 24   ALKPHOS 81 75   BILITOTAL 0.8 0.5     7-Day Micro Results       Collected Updated Procedure Result Status      06/07/2025 1900 06/07/2025 2339 Enteric Bacteria and Virus Panel PCR [[id number]]    Stool from Per Rectum    Final result Component Value   Campylobacter species Negative   Salmonella species Negative   Vibrio species Negative   Vibrio cholerae Negative   Yersinia enterocolitica Negative   Enteropathogenic E. coli (EPEC) Negative   Shiga-like toxin-producing E. coli (STEC) Negative    Shigella/Enteroinvasive E. coli (EIEC) Negative   Cryptosporidium species Negative   Giardia lamblia Negative   Norovirus Gl/Gll Negative   Rotavirus A Negative   Plesiomonas shigelloides Negative   Enteroaggregative E. coli (EAEC) Negative   Enterotoxigenic E. coli (ETEC) Negative   E. coli O157 NA   Cyclospora cayetanensis Negative   Entamoeba histolytica Negative   Adenovirus F40/41 Negative   Astrovirus Negative   Sapovirus Negative            06/01/2025 1729 06/01/2025 1819 Influenza A/B, RSV and SARS-CoV2 PCR (COVID-19) Nasopharyngeal [[id number]]    Swab from Nasopharyngeal    Final result Component Value   Influenza A PCR Negative   Influenza B PCR Negative   RSV PCR Negative   SARS CoV2 PCR Negative   NEGATIVE: SARS-CoV-2 (COVID-19) RNA not detected, presumed negative.                  Most Recent Urinalysis:  Recent Labs   Lab Test 06/01/25 1946   COLOR Yellow   APPEARANCE Clear   URINEGLC Negative   URINEBILI Negative   URINEKETONE 100*   SG 1.030   UBLD Negative   URINEPH 6.0   PROTEIN 100*   NITRITE Negative   LEUKEST Trace*   RBCU 5*   WBCU 4   ,   Results for orders placed or performed during the hospital encounter of 06/01/25   XR Chest 2 Views    Narrative    EXAM: XR CHEST 2 VIEWS  LOCATION: [facility]  DATE: 6/1/2025    INDICATION: syncope, heart burn chest pain  COMPARISON: PA and lateral views of the chest 5/31/2024      Impression    IMPRESSION: Negative chest.   CT Abdomen Pelvis w Contrast    Narrative    EXAM: CT ABDOMEN AND PELVIS WITH CONTRAST  LOCATION: [facility]  DATE/TIME: 6/4/2025 6:17 PM CDT    INDICATION: Nausea and vomiting.  COMPARISON: 1/20/2025.    TECHNIQUE: CT scan of the abdomen and pelvis was performed following injection of IV contrast. Multiplanar reformats were obtained. Dose reduction techniques were used.  CONTRAST: 100 mL Isovue-370.    FINDINGS:    LOWER CHEST: Unremarkable.    HEPATOBILIARY: Unremarkable.    SPLEEN:  Unremarkable.    PANCREAS: Unremarkable.    ADRENAL GLANDS: Unremarkable.    KIDNEYS/BLADDER: Unremarkable.    BOWEL: The stomach, small and large bowel are normal in caliber. Normal appendix. No bowel wall thickening, pneumatosis or free intraperitoneal gas.    LYMPH NODES: Unremarkable.    PELVIC ORGANS: No acute findings.    MUSCULOSKELETAL: No acute findings.    OTHER: A very small amount of free fluid in the pelvis.      Impression    IMPRESSION:   1.  A very small amount of free fluid in the pelvis. This is nonspecific, but could relate to a ruptured ovarian cyst.  2.  No other acute abnormality identified in the abdomen or pelvis. Normal appendix.        Discharge Medications   Current Discharge Medication List        START taking these medications    Details   sucralfate (CARAFATE) 1 GM tablet Take 1 tablet (1 g) by mouth 4 times daily (before meals and nightly) for 14 days.  Qty: 56 tablet, Refills: 0    Associated Diagnoses: Acute gastritis without hemorrhage, unspecified gastritis type           CONTINUE these medications which have NOT CHANGED    Details   omeprazole (PRILOSEC) 20 MG DR capsule Take 20 mg by mouth daily.      ondansetron (ZOFRAN ODT) 4 MG ODT tab Take 1 tablet (4 mg) by mouth every 6 hours as needed (Nausea and Vomiting).  Qty: 30 tablet, Refills: 0    Associated Diagnoses: Intractable nausea and vomiting      traZODone (DESYREL) 50 MG tablet Take 1 tablet (50 mg) by mouth at bedtime.  Qty: 30 tablet, Refills: 0    Associated Diagnoses: Insomnia, unspecified type           Allergies   No Known Allergies

## 2025-06-08 NOTE — PLAN OF CARE
"Goal Outcome Evaluation:      Plan of Care Reviewed With: patient    Overall Patient Progress: no change    Orientation: Alert and oriented x4  VSS. 99% on RA. afebrile.   LS: clear and equal bilaterally  GI: denies Passing gas. no BM. Intermittent nausea, no emesis. Declining PRN antiemetic when offered   : Adequate urine output.   Skin: intact  Activity: Independent. Pt slept comfortably throughout shift.   Pain: 7/10 abdominal cramping. Declining PRN pain medications when offered. Heating pad in place for comfort.   Updates/Plan: patient encouraged to call for pain and/or nausea medications as needed. Pt endorses understanding, continues to decline when offered overnight. Continue with current cares.           Problem: Adult Inpatient Plan of Care  Goal: Plan of Care Review  Description: The Plan of Care Review/Shift note should be completed every shift.  The Outcome Evaluation is a brief statement about your assessment that the patient is improving, declining, or no change.  This information will be displayed automatically on your shift  note.  Outcome: Progressing  Flowsheets (Taken 6/8/2025 3222)  Plan of Care Reviewed With: patient  Overall Patient Progress: no change  Goal: Patient-Specific Goal (Individualized)  Description: You can add care plan individualizations to a care plan. Examples of Individualization might be:  \"Parent requests to be called daily at 9am for status\", \"I have a hard time hearing out of my right ear\", or \"Do not touch me to wake me up as it startles  me\".  Outcome: Progressing  Goal: Absence of Hospital-Acquired Illness or Injury  Outcome: Progressing  Intervention: Identify and Manage Fall Risk  Recent Flowsheet Documentation  Taken 6/7/2025 6883 by Vanesa Camp RN  Safety Promotion/Fall Prevention:   clutter free environment maintained   nonskid shoes/slippers when out of bed   patient and family education   room near nurse's station   room organization consistent   " safety round/check completed   treat reversible contributory factors   treat underlying cause  Intervention: Prevent Skin Injury  Recent Flowsheet Documentation  Taken 6/7/2025 2357 by Vanesa Camp RN  Body Position: position changed independently  Skin Protection:   adhesive use limited   tubing/devices free from skin contact  Intervention: Prevent and Manage VTE (Venous Thromboembolism) Risk  Recent Flowsheet Documentation  Taken 6/7/2025 2357 by Vanesa Camp, PATRICE  VTE Prevention/Management: (ambulating independently)   patient refused intervention   SCDs off (sequential compression devices)  Intervention: Prevent Infection  Recent Flowsheet Documentation  Taken 6/7/2025 2357 by Vanesa Camp, PATRICE  Infection Prevention:   equipment surfaces disinfected   hand hygiene promoted   rest/sleep promoted   single patient room provided  Goal: Optimal Comfort and Wellbeing  Outcome: Progressing  Intervention: Monitor Pain and Promote Comfort  Recent Flowsheet Documentation  Taken 6/7/2025 2357 by Vanesa Camp, RN  Pain Management Interventions:   medication offered but refused   heat applied   rest  Goal: Readiness for Transition of Care  Outcome: Progressing     Problem: Nausea and Vomiting  Goal: Nausea and Vomiting Relief  Outcome: Progressing  Intervention: Prevent and Manage Nausea and Vomiting  Recent Flowsheet Documentation  Taken 6/7/2025 2357 by Vanesa Camp, RN  Nausea/Vomiting Interventions: (antiemetic offered but declined)   other (see comments)   stimuli minimized

## 2025-06-08 NOTE — PLAN OF CARE
"PRIMARY DIAGNOSIS: \"GENERIC\" NURSING/ Nausea and vomitting  OUTPATIENT/OBSERVATION GOALS TO BE MET BEFORE DISCHARGE:  ADLs back to baseline: Yes    Activity and level of assistance: Up with standby assistance.    Pain status: Improved-controlled with oral pain medications.    Return to near baseline physical activity: Yes     Discharge Planner Nurse   Safe discharge environment identified: Yes  Barriers to discharge: Yes       Entered by: Sina Leung RN 06/07/2025 9:26 PM     Please review provider order for any additional goals.   Nurse to notify provider when observation goals have been met and patient is ready for discharge.  Goal Outcome Evaluation:  Pt alert and orientated x4.  Pt up SBA due to IV pole.  Stool samples sent, pending results. Hx of H. Pylori and THC use.  NS at 100 ml/hr. Mag sulfate given. Mg+ K+ Phos protocols.  Clear liquid diet. IV went bad new IV placed in right arm.                          "

## 2025-06-08 NOTE — DISCHARGE SUMMARY
Patient presents alert and oriented in room. Denies any nausea or abdominal discomfort. Endorses some anxiety. Moved to a regular diet, IV removed, shower taken. Awaiting discharge meds to come up from pharmacy. Explained new medication to take in the morning, how to take med, and reasoning. Patient expressed understanding. Answered any questions patient had. Patient discharged home with family, personal belongings sent with.

## 2025-06-09 ENCOUNTER — OFFICE VISIT (OUTPATIENT)
Dept: INTERNAL MEDICINE | Facility: CLINIC | Age: 20
End: 2025-06-09
Attending: INTERNAL MEDICINE
Payer: COMMERCIAL

## 2025-06-09 ENCOUNTER — PATIENT OUTREACH (OUTPATIENT)
Dept: CARE COORDINATION | Facility: CLINIC | Age: 20
End: 2025-06-09

## 2025-06-09 VITALS
HEART RATE: 90 BPM | SYSTOLIC BLOOD PRESSURE: 136 MMHG | RESPIRATION RATE: 18 BRPM | BODY MASS INDEX: 37.15 KG/M2 | WEIGHT: 251.6 LBS | OXYGEN SATURATION: 100 % | DIASTOLIC BLOOD PRESSURE: 85 MMHG | TEMPERATURE: 97.9 F

## 2025-06-09 DIAGNOSIS — K29.50 CHRONIC GASTRITIS WITHOUT BLEEDING, UNSPECIFIED GASTRITIS TYPE: ICD-10-CM

## 2025-06-09 DIAGNOSIS — E66.09 CLASS 2 OBESITY DUE TO EXCESS CALORIES WITHOUT SERIOUS COMORBIDITY WITH BODY MASS INDEX (BMI) OF 37.0 TO 37.9 IN ADULT: ICD-10-CM

## 2025-06-09 DIAGNOSIS — F12.90 CANNABINOID HYPEREMESIS SYNDROME: ICD-10-CM

## 2025-06-09 DIAGNOSIS — R11.2 CANNABINOID HYPEREMESIS SYNDROME: ICD-10-CM

## 2025-06-09 DIAGNOSIS — R11.2 INTRACTABLE NAUSEA AND VOMITING: ICD-10-CM

## 2025-06-09 DIAGNOSIS — Z09 HOSPITAL DISCHARGE FOLLOW-UP: Primary | ICD-10-CM

## 2025-06-09 DIAGNOSIS — R53.83 FATIGUE, UNSPECIFIED TYPE: ICD-10-CM

## 2025-06-09 DIAGNOSIS — E66.812 CLASS 2 OBESITY DUE TO EXCESS CALORIES WITHOUT SERIOUS COMORBIDITY WITH BODY MASS INDEX (BMI) OF 37.0 TO 37.9 IN ADULT: ICD-10-CM

## 2025-06-09 DIAGNOSIS — K29.00 ACUTE GASTRITIS WITHOUT HEMORRHAGE, UNSPECIFIED GASTRITIS TYPE: ICD-10-CM

## 2025-06-09 DIAGNOSIS — Z11.4 SCREENING FOR HIV (HUMAN IMMUNODEFICIENCY VIRUS): ICD-10-CM

## 2025-06-09 PROBLEM — R09.89: Status: ACTIVE | Noted: 2024-08-31

## 2025-06-09 PROBLEM — N62 MACROMASTIA: Status: ACTIVE | Noted: 2022-07-12

## 2025-06-09 PROBLEM — I26.99 ACUTE PULMONARY EMBOLISM WITHOUT ACUTE COR PULMONALE (H): Status: ACTIVE | Noted: 2023-02-15

## 2025-06-09 PROBLEM — R00.1 SINUS BRADYCARDIA: Status: ACTIVE | Noted: 2024-08-31

## 2025-06-09 PROBLEM — A08.4 VIRAL GASTROENTERITIS: Status: ACTIVE | Noted: 2025-01-19

## 2025-06-09 PROBLEM — A04.8 HELICOBACTER PYLORI INFECTION: Status: ACTIVE | Noted: 2024-09-16

## 2025-06-09 PROBLEM — R11.10 VOMITING: Status: ACTIVE | Noted: 2024-08-31

## 2025-06-09 PROBLEM — E55.9 VITAMIN D DEFICIENCY: Status: ACTIVE | Noted: 2019-08-15

## 2025-06-09 PROBLEM — D50.9 IRON DEFICIENCY ANEMIA: Status: ACTIVE | Noted: 2019-08-15

## 2025-06-09 PROBLEM — R19.7 NAUSEA, VOMITING, AND DIARRHEA: Status: ACTIVE | Noted: 2024-08-31

## 2025-06-09 LAB
ATRIAL RATE - MUSE: 62 BPM
DIASTOLIC BLOOD PRESSURE - MUSE: NORMAL MMHG
ERYTHROCYTE [DISTWIDTH] IN BLOOD BY AUTOMATED COUNT: 17.3 % (ref 10–15)
H PYLORI AG STL QL IA: NEGATIVE
HCT VFR BLD AUTO: 35.3 % (ref 35–47)
HGB BLD-MCNC: 10.8 G/DL (ref 11.7–15.7)
HIV 1+2 AB+HIV1 P24 AG SERPL QL IA: NONREACTIVE
INTERPRETATION ECG - MUSE: NORMAL
MCH RBC QN AUTO: 22 PG (ref 26.5–33)
MCHC RBC AUTO-ENTMCNC: 30.6 G/DL (ref 31.5–36.5)
MCV RBC AUTO: 72 FL (ref 78–100)
P AXIS - MUSE: 31 DEGREES
PLATELET # BLD AUTO: 463 10E3/UL (ref 150–450)
PR INTERVAL - MUSE: 128 MS
QRS DURATION - MUSE: 80 MS
QT - MUSE: 444 MS
QTC - MUSE: 450 MS
R AXIS - MUSE: 49 DEGREES
RBC # BLD AUTO: 4.91 10E6/UL (ref 3.8–5.2)
SYSTOLIC BLOOD PRESSURE - MUSE: NORMAL MMHG
T AXIS - MUSE: 50 DEGREES
VENTRICULAR RATE- MUSE: 62 BPM
WBC # BLD AUTO: 6.7 10E3/UL (ref 4–11)

## 2025-06-09 PROCEDURE — 36415 COLL VENOUS BLD VENIPUNCTURE: CPT

## 2025-06-09 PROCEDURE — 84443 ASSAY THYROID STIM HORMONE: CPT

## 2025-06-09 PROCEDURE — 1126F AMNT PAIN NOTED NONE PRSNT: CPT

## 2025-06-09 PROCEDURE — 85027 COMPLETE CBC AUTOMATED: CPT

## 2025-06-09 PROCEDURE — 87389 HIV-1 AG W/HIV-1&-2 AB AG IA: CPT

## 2025-06-09 PROCEDURE — 80053 COMPREHEN METABOLIC PANEL: CPT

## 2025-06-09 PROCEDURE — 99495 TRANSJ CARE MGMT MOD F2F 14D: CPT

## 2025-06-09 PROCEDURE — 3075F SYST BP GE 130 - 139MM HG: CPT

## 2025-06-09 PROCEDURE — 3079F DIAST BP 80-89 MM HG: CPT

## 2025-06-09 RX ORDER — OMEPRAZOLE 20 MG/1
20 CAPSULE, DELAYED RELEASE ORAL DAILY
Qty: 168 CAPSULE | Refills: 0 | Status: SHIPPED | OUTPATIENT
Start: 2025-06-09

## 2025-06-09 RX ORDER — PROCHLORPERAZINE 25 MG
SUPPOSITORY, RECTAL RECTAL
COMMUNITY
End: 2025-06-09

## 2025-06-09 RX ORDER — SUCRALFATE 1 G/1
1 TABLET ORAL
Qty: 56 TABLET | Refills: 0 | Status: SHIPPED | OUTPATIENT
Start: 2025-06-09

## 2025-06-09 RX ORDER — LOPERAMIDE HYDROCHLORIDE 2 MG/1
CAPSULE ORAL
COMMUNITY
Start: 2025-01-19 | End: 2025-06-09

## 2025-06-09 RX ORDER — METOCLOPRAMIDE 10 MG/1
10 TABLET ORAL
COMMUNITY
Start: 2025-01-23 | End: 2025-06-09

## 2025-06-09 RX ORDER — NAPROXEN 500 MG/1
TABLET ORAL
COMMUNITY
Start: 2025-01-20 | End: 2025-06-09

## 2025-06-09 RX ORDER — CLINDAMYCIN PHOSPHATE 10 MG/ML
SOLUTION TOPICAL
COMMUNITY
Start: 2025-03-03

## 2025-06-09 RX ORDER — CAPSAICIN 0.07 G/100G
CREAM TOPICAL
COMMUNITY
Start: 2025-01-20 | End: 2025-06-09

## 2025-06-09 RX ORDER — TRIAMCINOLONE ACETONIDE 1 MG/G
OINTMENT TOPICAL
COMMUNITY
Start: 2025-03-03 | End: 2025-06-09

## 2025-06-09 RX ORDER — PANTOPRAZOLE SODIUM 20 MG/1
TABLET, DELAYED RELEASE ORAL
COMMUNITY
Start: 2024-09-11 | End: 2025-06-09

## 2025-06-09 ASSESSMENT — PATIENT HEALTH QUESTIONNAIRE - PHQ9
SUM OF ALL RESPONSES TO PHQ QUESTIONS 1-9: 16
10. IF YOU CHECKED OFF ANY PROBLEMS, HOW DIFFICULT HAVE THESE PROBLEMS MADE IT FOR YOU TO DO YOUR WORK, TAKE CARE OF THINGS AT HOME, OR GET ALONG WITH OTHER PEOPLE: SOMEWHAT DIFFICULT
SUM OF ALL RESPONSES TO PHQ QUESTIONS 1-9: 16

## 2025-06-09 ASSESSMENT — PAIN SCALES - GENERAL: PAINLEVEL_OUTOF10: NO PAIN (0)

## 2025-06-09 NOTE — PROGRESS NOTES
Assessment & Plan     Hospital discharge follow-up  Chronic gastritis without bleeding, unspecified gastritis type  Cannabinoid hyperemesis syndrome  Intractable nausea and vomiting  Acute gastritis without hemorrhage, unspecified gastritis type  Patient reports she has been struggling with abdominal pain nausea and emesis since 5/28/2025.  Patient was most recently hospitalized 6/1/2025 until 6/5/2025 with abdominal pain nausea and emesis with negative CT scan. She has a history of H.Pylori treated in the past as well as an EGD in February which showed chronic gastritis.  She reports she does not take the prescribed PPI regularly. Patient has had multiple ED visits in 2025 for abdominal pain, nausea and emesis. In the past she had undergone extensive workup with US, CT abdomen and pelvis, EGD 2/2025 with biopsies which showed chronic gastritis. She reports she has only used marijuana once 3 weeks ago.   - CBC with platelets; Future  - Comprehensive metabolic panel (BMP + Alb, Alk Phos, ALT, AST, Total. Bili, TP); Future  - omeprazole (PRILOSEC) 20 MG DR capsule; Take 1 capsule (20 mg) by mouth daily.  - sucralfate (CARAFATE) 1 GM tablet; Take 1 tablet (1 g) by mouth 4 times daily (before meals and nightly).  - CBC with platelets  - Comprehensive metabolic panel (BMP + Alb, Alk Phos, ALT, AST, Total. Bili, TP)  - Hospital to Primary Care - Establish PCP Referral  - sucralfate (CARAFATE) 1 GM tablet; Take 1 tablet (1 g) by mouth 4 times daily (before meals and nightly).  Screening for HIV (human immunodeficiency virus)  Wellness screening  - HIV Antigen Antibody Combo; Future  - HIV Antigen Antibody Combo    Fatigue, unspecified type  Patient states she is fatigued and is concerned because she is unable to lose weight and was questioning whether it was a thyroid issue  - TSH with free T4 reflex; Future  - TSH with free T4 reflex    Class 2 obesity due to excess calories without serious comorbidity with body mass  "index (BMI) of 37.0 to 37.9 in adult  Patient was requesting guidance for weight loss and wanted to speak to a weight loss clinic.  BMI is 37.15  - TSH with free T4 reflex; Future  - Adult Comprehensive Weight Management  Referral; Future  - TSH with free T4 reflex        MED REC REQUIRED  Post Medication Reconciliation Status:     BMI  Estimated body mass index is 37.15 kg/m  as calculated from the following:    Height as of 6/7/25: 1.753 m (5' 9\").    Weight as of this encounter: 114.1 kg (251 lb 9.6 oz).             Lance Young is a 19 year old, presenting for the following health issues:  ER F/U (For nausea) and Establish Care (Wants to establish care with new PCP.)    HPI    This is a follow-up for an ED to hospital discharge.  Patient reports she has been struggling with abdominal pain nausea and emesis since 5/28/2025.  Patient was most recently hospitalized 6/1/2025 until 6/5/2025 with abdominal pain nausea and emesis with negative CT scan. She has a history of H.Pylori treated in the past as well as an EGD in February which showed chronic gastritis.  She reports she does not take the prescribed PPI regularly. Patient has had multiple ED visits in 2025 for abdominal pain, nausea and emesis. In the past she had undergone extensive workup with US, CT abdomen and pelvis, EGD 2/2025 with biopsies which showed chronic gastritis. She reports she has only used marijuana once 3 weeks ago.     Pt reports that her stomach is feeling better. She is taking the omeprazole 2 times a day and the sucralfate 4 times a day. She does not have any sx in the night. Pt reports that she tries drinking as much fluids and lays down elevated on pillows. Pt reports that anxiety, salty and oily foods and spicy foods. Suggested omeprazole 2 times a day 30 minutes prior to breakfast and 30 minutes prior to dinner for 8 day. Then   Omeprazole daily 30 minutes prior to breakfast.    Patient was requesting guidance for " weight loss and wanted to speak to a weight loss clinic.  BMI is 37.15        ED/UC Followup:    Facility:  [facility]  Date of visit: 6/7/25  Reason for visit: Nausea  Current Status: Discharged        Review of Systems  Constitutional, neuro, ENT, endocrine, pulmonary, cardiac, gastrointestinal, genitourinary, musculoskeletal, integument and psychiatric systems are negative, except as otherwise noted.      Objective    /85 (BP Location: Right arm, Patient Position: Sitting, Cuff Size: Adult Regular)   Pulse 90   Temp 97.9  F (36.6  C) (Tympanic)   Resp 18   Wt 114.1 kg (251 lb 9.6 oz)   LMP 05/06/2025 (Approximate)   SpO2 100%   BMI 37.15 kg/m    Body mass index is 37.15 kg/m .  Physical Exam   GENERAL: alert and no distress  NECK: no adenopathy, no asymmetry, masses, or scars  RESP: lungs clear to auscultation - no rales, rhonchi or wheezes  CV: regular rate and rhythm, normal S1 S2, no S3 or S4, no murmur, click or rub, no peripheral edema  ABDOMEN: tenderness epigastric and bowel sounds normal  MS: no gross musculoskeletal defects noted, no edema  SKIN: no suspicious lesions or rashes  NEURO: Normal strength and tone, mentation intact and speech normal  PSYCH: mentation appears normal, affect normal/bright            Signed Electronically by: UZIEL Swanson CNP    Answers submitted by the patient for this visit:  Patient Health Questionnaire (Submitted on 6/9/2025)  If you checked off any problems, how difficult have these problems made it for you to do your work, take care of things at home, or get along with other people?: Somewhat difficult  PHQ9 TOTAL SCORE: 16

## 2025-06-09 NOTE — PROGRESS NOTES
[facility]    Background: Transitional Care Management program identified per system criteria and reviewed by [facility] team for possible outreach.    Assessment: Upon chart review, [facility] Team member will not proceed with patient outreach related to this episode of Transitional Care Management program due to reason below:    Patient has a follow up appointment with an appropriate provider today for hospital discharge    Plan: Transitional Care Management episode addressed appropriately per reason noted above.          LONNIE Arzate  [phone]  [facility]

## 2025-06-09 NOTE — PATIENT INSTRUCTIONS
Suggested omeprazole 2 times a day 30 minutes prior to breakfast and 30 minutes prior to dinner for 8 day. Then Omeprazole daily 30 minutes prior to breakfast.

## 2025-06-10 ENCOUNTER — PATIENT OUTREACH (OUTPATIENT)
Dept: CARE COORDINATION | Facility: CLINIC | Age: 20
End: 2025-06-10
Payer: COMMERCIAL

## 2025-06-10 LAB
ALBUMIN SERPL BCG-MCNC: 4 G/DL (ref 3.5–5.2)
ALP SERPL-CCNC: 85 U/L (ref 40–150)
ALT SERPL W P-5'-P-CCNC: 15 U/L (ref 0–50)
ANION GAP SERPL CALCULATED.3IONS-SCNC: 12 MMOL/L (ref 7–15)
AST SERPL W P-5'-P-CCNC: 22 U/L (ref 0–45)
BILIRUB SERPL-MCNC: 0.3 MG/DL
BUN SERPL-MCNC: 6.5 MG/DL (ref 6–20)
CALCIUM SERPL-MCNC: 8.5 MG/DL (ref 8.8–10.4)
CHLORIDE SERPL-SCNC: 103 MMOL/L (ref 98–107)
CREAT SERPL-MCNC: 0.65 MG/DL (ref 0.51–0.95)
EGFRCR SERPLBLD CKD-EPI 2021: >90 ML/MIN/1.73M2
GLUCOSE SERPL-MCNC: 91 MG/DL (ref 70–99)
HCO3 SERPL-SCNC: 21 MMOL/L (ref 22–29)
POTASSIUM SERPL-SCNC: 3.8 MMOL/L (ref 3.4–5.3)
PROT SERPL-MCNC: 6.6 G/DL (ref 6.4–8.3)
SODIUM SERPL-SCNC: 136 MMOL/L (ref 135–145)
TSH SERPL DL<=0.005 MIU/L-ACNC: 1.27 UIU/ML (ref 0.3–4.2)

## 2025-06-11 ENCOUNTER — RESULTS FOLLOW-UP (OUTPATIENT)
Dept: INTERNAL MEDICINE | Facility: CLINIC | Age: 20
End: 2025-06-11

## 2025-06-12 ENCOUNTER — PATIENT OUTREACH (OUTPATIENT)
Dept: CARE COORDINATION | Facility: CLINIC | Age: 20
End: 2025-06-12
Payer: COMMERCIAL

## 2025-07-17 ENCOUNTER — APPOINTMENT (OUTPATIENT)
Dept: ULTRASOUND IMAGING | Facility: CLINIC | Age: 20
End: 2025-07-17
Attending: EMERGENCY MEDICINE
Payer: COMMERCIAL

## 2025-07-17 ENCOUNTER — HOSPITAL ENCOUNTER (OUTPATIENT)
Facility: CLINIC | Age: 20
Setting detail: OBSERVATION
End: 2025-07-17
Attending: EMERGENCY MEDICINE | Admitting: INTERNAL MEDICINE
Payer: COMMERCIAL

## 2025-07-17 VITALS
TEMPERATURE: 98.7 F | SYSTOLIC BLOOD PRESSURE: 147 MMHG | WEIGHT: 110.9 LBS | DIASTOLIC BLOOD PRESSURE: 86 MMHG | OXYGEN SATURATION: 99 % | HEART RATE: 53 BPM | BODY MASS INDEX: 16.42 KG/M2 | RESPIRATION RATE: 17 BRPM | HEIGHT: 69 IN

## 2025-07-17 DIAGNOSIS — R11.2 CANNABINOID HYPEREMESIS SYNDROME: ICD-10-CM

## 2025-07-17 DIAGNOSIS — G47.00 INSOMNIA, UNSPECIFIED TYPE: ICD-10-CM

## 2025-07-17 DIAGNOSIS — D50.8 OTHER IRON DEFICIENCY ANEMIA: ICD-10-CM

## 2025-07-17 DIAGNOSIS — R10.30 LOWER ABDOMINAL PAIN: ICD-10-CM

## 2025-07-17 DIAGNOSIS — N83.209 HEMORRHAGIC OVARIAN CYST: ICD-10-CM

## 2025-07-17 DIAGNOSIS — R11.2 INTRACTABLE NAUSEA AND VOMITING: ICD-10-CM

## 2025-07-17 DIAGNOSIS — F41.9 ANXIETY: Primary | ICD-10-CM

## 2025-07-17 DIAGNOSIS — F12.90 CANNABINOID HYPEREMESIS SYNDROME: ICD-10-CM

## 2025-07-17 LAB
ANION GAP SERPL CALCULATED.3IONS-SCNC: 16 MMOL/L (ref 7–15)
ATRIAL RATE - MUSE: 60 BPM
BASOPHILS # BLD AUTO: 0 10E3/UL (ref 0–0.2)
BASOPHILS NFR BLD AUTO: 0 %
BUN SERPL-MCNC: 14.4 MG/DL (ref 6–20)
CALCIUM SERPL-MCNC: 8.8 MG/DL (ref 8.8–10.4)
CHLORIDE SERPL-SCNC: 106 MMOL/L (ref 98–107)
CREAT SERPL-MCNC: 0.58 MG/DL (ref 0.51–0.95)
DIASTOLIC BLOOD PRESSURE - MUSE: NORMAL MMHG
EGFRCR SERPLBLD CKD-EPI 2021: >90 ML/MIN/1.73M2
EOSINOPHIL # BLD AUTO: 0 10E3/UL (ref 0–0.7)
EOSINOPHIL NFR BLD AUTO: 0 %
ERYTHROCYTE [DISTWIDTH] IN BLOOD BY AUTOMATED COUNT: 18.9 % (ref 10–15)
GLUCOSE SERPL-MCNC: 91 MG/DL (ref 70–99)
HCG SERPL QL: NEGATIVE
HCO3 SERPL-SCNC: 20 MMOL/L (ref 22–29)
HCT VFR BLD AUTO: 35.3 % (ref 35–47)
HGB BLD-MCNC: 10.7 G/DL (ref 11.7–15.7)
HOLD SPECIMEN: NORMAL
IMM GRANULOCYTES # BLD: 0 10E3/UL
IMM GRANULOCYTES NFR BLD: 0 %
INTERPRETATION ECG - MUSE: NORMAL
LYMPHOCYTES # BLD AUTO: 1.8 10E3/UL (ref 0.8–5.3)
LYMPHOCYTES NFR BLD AUTO: 24 %
MAGNESIUM SERPL-MCNC: 1.9 MG/DL (ref 1.7–2.3)
MCH RBC QN AUTO: 21.7 PG (ref 26.5–33)
MCHC RBC AUTO-ENTMCNC: 30.3 G/DL (ref 31.5–36.5)
MCV RBC AUTO: 72 FL (ref 78–100)
MONOCYTES # BLD AUTO: 0.5 10E3/UL (ref 0–1.3)
MONOCYTES NFR BLD AUTO: 6 %
NEUTROPHILS # BLD AUTO: 5.1 10E3/UL (ref 1.6–8.3)
NEUTROPHILS NFR BLD AUTO: 69 %
NRBC # BLD AUTO: 0 10E3/UL
NRBC BLD AUTO-RTO: 0 /100
P AXIS - MUSE: 59 DEGREES
PHOSPHATE SERPL-MCNC: 2.7 MG/DL (ref 2.5–4.5)
PLATELET # BLD AUTO: 509 10E3/UL (ref 150–450)
POTASSIUM SERPL-SCNC: 3.3 MMOL/L (ref 3.4–5.3)
PR INTERVAL - MUSE: 136 MS
QRS DURATION - MUSE: 76 MS
QT - MUSE: 514 MS
QTC - MUSE: 514 MS
R AXIS - MUSE: 60 DEGREES
RBC # BLD AUTO: 4.92 10E6/UL (ref 3.8–5.2)
SODIUM SERPL-SCNC: 142 MMOL/L (ref 135–145)
SYSTOLIC BLOOD PRESSURE - MUSE: NORMAL MMHG
T AXIS - MUSE: 53 DEGREES
VENTRICULAR RATE- MUSE: 60 BPM
WBC # BLD AUTO: 7.3 10E3/UL (ref 4–11)

## 2025-07-17 PROCEDURE — 96361 HYDRATE IV INFUSION ADD-ON: CPT

## 2025-07-17 PROCEDURE — 258N000003 HC RX IP 258 OP 636: Performed by: EMERGENCY MEDICINE

## 2025-07-17 PROCEDURE — 93005 ELECTROCARDIOGRAM TRACING: CPT

## 2025-07-17 PROCEDURE — 99223 1ST HOSP IP/OBS HIGH 75: CPT | Performed by: PHYSICIAN ASSISTANT

## 2025-07-17 PROCEDURE — G0378 HOSPITAL OBSERVATION PER HR: HCPCS

## 2025-07-17 PROCEDURE — 96374 THER/PROPH/DIAG INJ IV PUSH: CPT

## 2025-07-17 PROCEDURE — 84100 ASSAY OF PHOSPHORUS: CPT | Performed by: PHYSICIAN ASSISTANT

## 2025-07-17 PROCEDURE — 99285 EMERGENCY DEPT VISIT HI MDM: CPT | Mod: 25 | Performed by: EMERGENCY MEDICINE

## 2025-07-17 PROCEDURE — 85004 AUTOMATED DIFF WBC COUNT: CPT | Performed by: EMERGENCY MEDICINE

## 2025-07-17 PROCEDURE — 83550 IRON BINDING TEST: CPT | Performed by: PHYSICIAN ASSISTANT

## 2025-07-17 PROCEDURE — 250N000011 HC RX IP 250 OP 636: Performed by: PHYSICIAN ASSISTANT

## 2025-07-17 PROCEDURE — 96375 TX/PRO/DX INJ NEW DRUG ADDON: CPT

## 2025-07-17 PROCEDURE — 250N000011 HC RX IP 250 OP 636: Performed by: EMERGENCY MEDICINE

## 2025-07-17 PROCEDURE — 250N000011 HC RX IP 250 OP 636: Performed by: INTERNAL MEDICINE

## 2025-07-17 PROCEDURE — 76830 TRANSVAGINAL US NON-OB: CPT

## 2025-07-17 PROCEDURE — 36415 COLL VENOUS BLD VENIPUNCTURE: CPT | Performed by: EMERGENCY MEDICINE

## 2025-07-17 PROCEDURE — 80048 BASIC METABOLIC PNL TOTAL CA: CPT | Performed by: EMERGENCY MEDICINE

## 2025-07-17 PROCEDURE — 250N000009 HC RX 250: Performed by: PHYSICIAN ASSISTANT

## 2025-07-17 PROCEDURE — 83735 ASSAY OF MAGNESIUM: CPT | Performed by: EMERGENCY MEDICINE

## 2025-07-17 PROCEDURE — 96376 TX/PRO/DX INJ SAME DRUG ADON: CPT

## 2025-07-17 PROCEDURE — 84703 CHORIONIC GONADOTROPIN ASSAY: CPT | Performed by: EMERGENCY MEDICINE

## 2025-07-17 RX ORDER — SCOPOLAMINE 1 MG/3D
1 PATCH, EXTENDED RELEASE TRANSDERMAL
Status: DISCONTINUED | OUTPATIENT
Start: 2025-07-17 | End: 2025-07-20 | Stop reason: HOSPADM

## 2025-07-17 RX ORDER — TRAZODONE HYDROCHLORIDE 50 MG/1
50 TABLET ORAL
Status: ON HOLD | COMMUNITY
End: 2025-07-20

## 2025-07-17 RX ORDER — ACETAMINOPHEN 325 MG/1
650 TABLET ORAL EVERY 4 HOURS PRN
Status: DISCONTINUED | OUTPATIENT
Start: 2025-07-17 | End: 2025-07-20 | Stop reason: HOSPADM

## 2025-07-17 RX ORDER — MAGNESIUM SULFATE HEPTAHYDRATE 40 MG/ML
2 INJECTION, SOLUTION INTRAVENOUS ONCE
Status: COMPLETED | OUTPATIENT
Start: 2025-07-17 | End: 2025-07-17

## 2025-07-17 RX ORDER — ONDANSETRON 4 MG/1
4 TABLET, ORALLY DISINTEGRATING ORAL EVERY 6 HOURS PRN
Status: DISCONTINUED | OUTPATIENT
Start: 2025-07-17 | End: 2025-07-20 | Stop reason: HOSPADM

## 2025-07-17 RX ORDER — AMOXICILLIN 250 MG
2 CAPSULE ORAL 2 TIMES DAILY PRN
Status: DISCONTINUED | OUTPATIENT
Start: 2025-07-17 | End: 2025-07-20 | Stop reason: HOSPADM

## 2025-07-17 RX ORDER — POTASSIUM CHLORIDE 7.45 MG/ML
10 INJECTION INTRAVENOUS ONCE
Status: COMPLETED | OUTPATIENT
Start: 2025-07-17 | End: 2025-07-17

## 2025-07-17 RX ORDER — ACETAMINOPHEN 650 MG/1
650 SUPPOSITORY RECTAL EVERY 4 HOURS PRN
Status: DISCONTINUED | OUTPATIENT
Start: 2025-07-17 | End: 2025-07-20 | Stop reason: HOSPADM

## 2025-07-17 RX ORDER — PROCHLORPERAZINE MALEATE 5 MG/1
10 TABLET ORAL EVERY 6 HOURS PRN
Status: DISCONTINUED | OUTPATIENT
Start: 2025-07-17 | End: 2025-07-20 | Stop reason: HOSPADM

## 2025-07-17 RX ORDER — KETOROLAC TROMETHAMINE 15 MG/ML
15 INJECTION, SOLUTION INTRAMUSCULAR; INTRAVENOUS EVERY 6 HOURS PRN
Status: DISCONTINUED | OUTPATIENT
Start: 2025-07-17 | End: 2025-07-20 | Stop reason: HOSPADM

## 2025-07-17 RX ORDER — DIPHENHYDRAMINE HYDROCHLORIDE 50 MG/ML
25 INJECTION, SOLUTION INTRAMUSCULAR; INTRAVENOUS ONCE
Status: COMPLETED | OUTPATIENT
Start: 2025-07-17 | End: 2025-07-17

## 2025-07-17 RX ORDER — ONDANSETRON 2 MG/ML
4 INJECTION INTRAMUSCULAR; INTRAVENOUS EVERY 6 HOURS PRN
Status: DISCONTINUED | OUTPATIENT
Start: 2025-07-17 | End: 2025-07-20 | Stop reason: HOSPADM

## 2025-07-17 RX ORDER — AMOXICILLIN 250 MG
1 CAPSULE ORAL 2 TIMES DAILY PRN
Status: DISCONTINUED | OUTPATIENT
Start: 2025-07-17 | End: 2025-07-20 | Stop reason: HOSPADM

## 2025-07-17 RX ORDER — ONDANSETRON 2 MG/ML
4 INJECTION INTRAMUSCULAR; INTRAVENOUS ONCE
Status: COMPLETED | OUTPATIENT
Start: 2025-07-17 | End: 2025-07-17

## 2025-07-17 RX ORDER — DIAZEPAM 10 MG/2ML
5 INJECTION, SOLUTION INTRAMUSCULAR; INTRAVENOUS EVERY 6 HOURS PRN
Status: DISCONTINUED | OUTPATIENT
Start: 2025-07-17 | End: 2025-07-18

## 2025-07-17 RX ORDER — KETOROLAC TROMETHAMINE 15 MG/ML
15 INJECTION, SOLUTION INTRAMUSCULAR; INTRAVENOUS ONCE
Status: COMPLETED | OUTPATIENT
Start: 2025-07-17 | End: 2025-07-17

## 2025-07-17 RX ORDER — METOCLOPRAMIDE 10 MG/1
10 TABLET ORAL EVERY 6 HOURS PRN
Status: ON HOLD | COMMUNITY
Start: 2025-01-26 | End: 2025-07-19

## 2025-07-17 RX ORDER — METOCLOPRAMIDE HYDROCHLORIDE 5 MG/ML
10 INJECTION INTRAMUSCULAR; INTRAVENOUS EVERY 6 HOURS
Status: DISCONTINUED | OUTPATIENT
Start: 2025-07-17 | End: 2025-07-18

## 2025-07-17 RX ORDER — TRAZODONE HYDROCHLORIDE 50 MG/1
50 TABLET ORAL
Status: DISCONTINUED | OUTPATIENT
Start: 2025-07-17 | End: 2025-07-20

## 2025-07-17 RX ORDER — DEXTROSE MONOHYDRATE AND SODIUM CHLORIDE 5; .9 G/100ML; G/100ML
INJECTION, SOLUTION INTRAVENOUS CONTINUOUS
Status: ACTIVE | OUTPATIENT
Start: 2025-07-17 | End: 2025-07-17

## 2025-07-17 RX ORDER — HALOPERIDOL 5 MG/ML
2.5 INJECTION INTRAMUSCULAR ONCE
Status: COMPLETED | OUTPATIENT
Start: 2025-07-17 | End: 2025-07-17

## 2025-07-17 RX ORDER — SODIUM CHLORIDE AND POTASSIUM CHLORIDE 150; 900 MG/100ML; MG/100ML
INJECTION, SOLUTION INTRAVENOUS CONTINUOUS
Status: DISCONTINUED | OUTPATIENT
Start: 2025-07-17 | End: 2025-07-20 | Stop reason: HOSPADM

## 2025-07-17 RX ADMIN — DIPHENHYDRAMINE HYDROCHLORIDE 25 MG: 50 INJECTION, SOLUTION INTRAMUSCULAR; INTRAVENOUS at 16:21

## 2025-07-17 RX ADMIN — METOCLOPRAMIDE 10 MG: 5 INJECTION, SOLUTION INTRAMUSCULAR; INTRAVENOUS at 22:48

## 2025-07-17 RX ADMIN — SODIUM CHLORIDE 1000 ML: 0.9 INJECTION, SOLUTION INTRAVENOUS at 16:20

## 2025-07-17 RX ADMIN — SODIUM CHLORIDE AND POTASSIUM CHLORIDE: .9; .15 SOLUTION INTRAVENOUS at 22:48

## 2025-07-17 RX ADMIN — SCOPOLAMINE 1 PATCH: 1.5 PATCH, EXTENDED RELEASE TRANSDERMAL at 22:48

## 2025-07-17 RX ADMIN — DEXTROSE AND SODIUM CHLORIDE: 5; .9 INJECTION, SOLUTION INTRAVENOUS at 20:33

## 2025-07-17 RX ADMIN — POTASSIUM CHLORIDE 10 MEQ: 7.46 INJECTION, SOLUTION INTRAVENOUS at 18:15

## 2025-07-17 RX ADMIN — MAGNESIUM SULFATE HEPTAHYDRATE 2 G: 40 INJECTION, SOLUTION INTRAVENOUS at 18:16

## 2025-07-17 RX ADMIN — KETOROLAC TROMETHAMINE 15 MG: 15 INJECTION, SOLUTION INTRAMUSCULAR; INTRAVENOUS at 22:47

## 2025-07-17 RX ADMIN — KETOROLAC TROMETHAMINE 15 MG: 15 INJECTION, SOLUTION INTRAMUSCULAR; INTRAVENOUS at 18:11

## 2025-07-17 RX ADMIN — HALOPERIDOL LACTATE 2.5 MG: 5 INJECTION, SOLUTION INTRAMUSCULAR at 18:11

## 2025-07-17 RX ADMIN — ONDANSETRON 4 MG: 2 INJECTION, SOLUTION INTRAMUSCULAR; INTRAVENOUS at 16:21

## 2025-07-17 ASSESSMENT — ACTIVITIES OF DAILY LIVING (ADL)
ADLS_ACUITY_SCORE: 46
ADLS_ACUITY_SCORE: 52
ADLS_ACUITY_SCORE: 46
ADLS_ACUITY_SCORE: 52

## 2025-07-17 NOTE — ED TRIAGE NOTES
Pt states her period started 3 days ago and since then has felt very nauseated, throughout abd pain. Pt complains of cold sweats. Pt also notes her period is more light than normal and has had two syncope episodes.

## 2025-07-17 NOTE — ED NOTES
"Pt tried some cold water and ginger ale. Mentioned ginger ale \"was too sweet\" and otherwise couldn't hold down oral fluids.   "

## 2025-07-17 NOTE — ED PROVIDER NOTES
"  Emergency Department Note      History of Present Illness     Chief Complaint   Abdominal Pain and Syncope      HPI   Hector Mazariegos is a 19 year old female     Presenting with 3 days of symptoms including lower abdominal pain and cramping this coincided with the onset of her menstrual cycle and ongoing nausea and vomiting unable to keep down any p.o. intake for greater than 24 hours.  Describes cold sweats but no objective fevers at home.  No abnormal vaginal discharge.  Denies any chance that she is pregnant.  States she has had lightheadedness dizziness as well as 2 syncopal episodes at home.  No significant secondary trauma.  No current shortness of breath or significant chest discomfort.    Seen in outside emergency room earlier today and seen in a different ER 2 days prior, multiple admissions over the last couple of months for cyclical vomiting syndrome and ongoing nausea/vomiting.    Independent Historian       Review of External Notes   See ed course     Past Medical History     Medical History and Problem List   No past medical history on file.    Medications   clindamycin (CLEOCIN T) 1 % SWAB  omeprazole (PRILOSEC) 20 MG DR capsule  omeprazole (PRILOSEC) 20 MG DR capsule  ondansetron (ZOFRAN ODT) 4 MG ODT tab  sucralfate (CARAFATE) 1 GM tablet  traZODone (DESYREL) 50 MG tablet        Surgical History   Past Surgical History:   Procedure Laterality Date    ESOPHAGOSCOPY, GASTROSCOPY, DUODENOSCOPY (EGD), COMBINED N/A 2/26/2025    Procedure: ESOPHAGOGASTRODUODENOSCOPY, WITH BIOPSY;  Surgeon: Warren Kevin MD;  Location:  GI       Physical Exam     Patient Vitals for the past 24 hrs:   BP Temp Temp src Pulse Resp SpO2 Height Weight   07/17/25 1745 -- -- -- -- -- 100 % -- --   07/17/25 1445 136/66 97.3  F (36.3  C) Temporal 67 17 100 % -- --   07/17/25 1443 -- -- -- -- -- -- 1.753 m (5' 9\") 111 kg (244 lb 11.4 oz)         CV: ppi, regular   Resp: speaking in full sentences without any resp distress, " lungs clear  Skin: warm dry well perfused  Neuro: Alert, no gross motor or sensory deficits,  gait stable      Abdomen: No distention, no significant localizing tenderness palpation      Diagnostics     Lab Results   Labs Ordered and Resulted from Time of ED Arrival to Time of ED Departure   BASIC METABOLIC PANEL - Abnormal       Result Value    Sodium 142      Potassium 3.3 (*)     Chloride 106      Carbon Dioxide (CO2) 20 (*)     Anion Gap 16 (*)     Urea Nitrogen 14.4      Creatinine 0.58      GFR Estimate >90      Calcium 8.8      Glucose 91     CBC WITH PLATELETS AND DIFFERENTIAL - Abnormal    WBC Count 7.3      RBC Count 4.92      Hemoglobin 10.7 (*)     Hematocrit 35.3      MCV 72 (*)     MCH 21.7 (*)     MCHC 30.3 (*)     RDW 18.9 (*)     Platelet Count 509 (*)     % Neutrophils 69      % Lymphocytes 24      % Monocytes 6      % Eosinophils 0      % Basophils 0      % Immature Granulocytes 0      NRBCs per 100 WBC 0      Absolute Neutrophils 5.1      Absolute Lymphocytes 1.8      Absolute Monocytes 0.5      Absolute Eosinophils 0.0      Absolute Basophils 0.0      Absolute Immature Granulocytes 0.0      Absolute NRBCs 0.0     MAGNESIUM - Normal    Magnesium 1.9     HCG QUALITATIVE PREGNANCY - Normal    hCG Serum Qualitative Negative         Imaging   US Pelvic Complete with Transvaginal   Final Result   IMPRESSION:   1.  Limited exam.   2.  2.5 cm probable right ovarian hemorrhagic cyst.          EKG   ECG results from 07/17/25   EKG 12 lead     Value    Systolic Blood Pressure     Diastolic Blood Pressure     Ventricular Rate 60    Atrial Rate 60    OR Interval 136    QRS Duration 76        QTc 514    P Axis 59    R AXIS 60    T Axis 53    Interpretation ECG      Sinus rhythm with sinus arrhythmia  Prolonged QT  Abnormal ECG  When compared with ECG of 07-Jun-2025 10:48,  QT has lengthened  Unconfirmed report - interpretation of this ECG is computer generated - see medical record for final  interpretation  Confirmed by - EMERGENCY ROOM, PHYSICIAN ([id number]),  Tommy Jolly ([id number]) on 7/17/2025 3:17:05 PM           Independent Interpretation       ED Course      Medications Administered   Medications   magnesium sulfate 2 g in 50 mL sterile water intermittent infusion (2 g Intravenous $New Bag 7/17/25 1816)   potassium chloride 10 mEq in 100 mL sterile water infusion (10 mEq Intravenous $New Bag 7/17/25 1815)   dextrose 5% and 0.9% NaCl infusion (has no administration in time range)   sodium chloride 0.9% BOLUS 1,000 mL (1,000 mLs Intravenous $New Bag 7/17/25 1620)   ondansetron (ZOFRAN) injection 4 mg (4 mg Intravenous $Given 7/17/25 1621)   haloperidol lactate (HALDOL) injection 2.5 mg (2.5 mg Intravenous $Given 7/17/25 1811)   diphenhydrAMINE (BENADRYL) injection 25 mg (25 mg Intravenous $Given 7/17/25 1621)   ketorolac (TORADOL) injection 15 mg (15 mg Intravenous $Given 7/17/25 1811)       Procedures   Procedures     Discussion of Management     Admitting hospitalist    ED Course   ED Course as of 07/17/25 1832   Thu Jul 17, 2025   1517 I reviewed discharge summary from hospitalization in June 2025 for acute on chronic gastritis and intractable nausea/vomiting.   1518 I reviewed ED visit from earlier this same day at [place] [address] emergency department       1520 ECG 9/15/2020: Sinus rhythm, QTc prolonged at 514 ms, narrow complex, no evidence of acute ischemia.       Additional Documentation      Medical Decision Making / Diagnosis     CMS Diagnoses:         [facility]   Hector Mazariegos is a 19 year old female     Presenting with lower abdominal pain and intractable nausea and vomiting.  History of cyclical vomiting syndrome.  At urgent care earlier the same day had tests which were reviewed.   I do not feel you need to duplicate all of that testing.  Ultrasound of the pelvis shows right ovarian cyst, not of significant size to be concerning for torsion and clinically not concerning for  intermittent torsion.  I do not feel like she needs a CT based on history examination as my suspicion for an intra-abdominal surgical vascular or infectious etiology is low.  CT scan 1 month ago when admitted for similar symptoms negative.    Unfortunately despite fluids and multiple antiemetics unable to successfully tolerate p.o. intake and so we will admit to observation for symptomatic management.      Disposition   The patient was admitted to observation.       Diagnosis     ICD-10-CM    1. Intractable nausea and vomiting  R11.2       2. Lower abdominal pain  R10.30            Discharge Medications   New Prescriptions    No medications on file         MD Segun Corrales Jerome Richard, MD  07/17/25 6784

## 2025-07-18 LAB
ANION GAP SERPL CALCULATED.3IONS-SCNC: 11 MMOL/L (ref 7–15)
ATRIAL RATE - MUSE: 57 BPM
BUN SERPL-MCNC: 9.1 MG/DL (ref 6–20)
CALCIUM SERPL-MCNC: 7.9 MG/DL (ref 8.8–10.4)
CHLORIDE SERPL-SCNC: 108 MMOL/L (ref 98–107)
CREAT SERPL-MCNC: 0.58 MG/DL (ref 0.51–0.95)
DIASTOLIC BLOOD PRESSURE - MUSE: NORMAL MMHG
EGFRCR SERPLBLD CKD-EPI 2021: >90 ML/MIN/1.73M2
ERYTHROCYTE [DISTWIDTH] IN BLOOD BY AUTOMATED COUNT: 18.6 % (ref 10–15)
GLUCOSE SERPL-MCNC: 90 MG/DL (ref 70–99)
HCO3 SERPL-SCNC: 21 MMOL/L (ref 22–29)
HCT VFR BLD AUTO: 32 % (ref 35–47)
HGB BLD-MCNC: 9.7 G/DL (ref 11.7–15.7)
INTERPRETATION ECG - MUSE: NORMAL
IRON BINDING CAPACITY (ROCHE): 378 UG/DL (ref 240–430)
IRON SATN MFR SERPL: 5 % (ref 15–46)
IRON SERPL-MCNC: 20 UG/DL (ref 37–145)
MCH RBC QN AUTO: 21.8 PG (ref 26.5–33)
MCHC RBC AUTO-ENTMCNC: 30.3 G/DL (ref 31.5–36.5)
MCV RBC AUTO: 72 FL (ref 78–100)
P AXIS - MUSE: 31 DEGREES
PLATELET # BLD AUTO: 428 10E3/UL (ref 150–450)
POTASSIUM SERPL-SCNC: 3.3 MMOL/L (ref 3.4–5.3)
PR INTERVAL - MUSE: 136 MS
QRS DURATION - MUSE: 72 MS
QT - MUSE: 490 MS
QTC - MUSE: 476 MS
R AXIS - MUSE: 32 DEGREES
RBC # BLD AUTO: 4.45 10E6/UL (ref 3.8–5.2)
SODIUM SERPL-SCNC: 140 MMOL/L (ref 135–145)
SYSTOLIC BLOOD PRESSURE - MUSE: NORMAL MMHG
T AXIS - MUSE: 36 DEGREES
VENTRICULAR RATE- MUSE: 57 BPM
WBC # BLD AUTO: 6.7 10E3/UL (ref 4–11)

## 2025-07-18 PROCEDURE — 120N000001 HC R&B MED SURG/OB

## 2025-07-18 PROCEDURE — 96375 TX/PRO/DX INJ NEW DRUG ADDON: CPT

## 2025-07-18 PROCEDURE — 96376 TX/PRO/DX INJ SAME DRUG ADON: CPT

## 2025-07-18 PROCEDURE — 99232 SBSQ HOSP IP/OBS MODERATE 35: CPT | Performed by: NURSE PRACTITIONER

## 2025-07-18 PROCEDURE — G0378 HOSPITAL OBSERVATION PER HR: HCPCS

## 2025-07-18 PROCEDURE — 96361 HYDRATE IV INFUSION ADD-ON: CPT

## 2025-07-18 PROCEDURE — 250N000013 HC RX MED GY IP 250 OP 250 PS 637: Performed by: PHYSICIAN ASSISTANT

## 2025-07-18 PROCEDURE — 85018 HEMOGLOBIN: CPT | Performed by: PHYSICIAN ASSISTANT

## 2025-07-18 PROCEDURE — 36415 COLL VENOUS BLD VENIPUNCTURE: CPT | Performed by: PHYSICIAN ASSISTANT

## 2025-07-18 PROCEDURE — 93010 ELECTROCARDIOGRAM REPORT: CPT | Performed by: INTERNAL MEDICINE

## 2025-07-18 PROCEDURE — 250N000011 HC RX IP 250 OP 636: Performed by: INTERNAL MEDICINE

## 2025-07-18 PROCEDURE — 250N000011 HC RX IP 250 OP 636: Performed by: NURSE PRACTITIONER

## 2025-07-18 PROCEDURE — 250N000013 HC RX MED GY IP 250 OP 250 PS 637: Performed by: NURSE PRACTITIONER

## 2025-07-18 PROCEDURE — 93005 ELECTROCARDIOGRAM TRACING: CPT

## 2025-07-18 PROCEDURE — 250N000011 HC RX IP 250 OP 636: Performed by: PHYSICIAN ASSISTANT

## 2025-07-18 PROCEDURE — 80048 BASIC METABOLIC PNL TOTAL CA: CPT | Performed by: PHYSICIAN ASSISTANT

## 2025-07-18 RX ORDER — ACETAMINOPHEN 325 MG/1
975 TABLET ORAL EVERY 8 HOURS
Status: DISCONTINUED | OUTPATIENT
Start: 2025-07-18 | End: 2025-07-20 | Stop reason: HOSPADM

## 2025-07-18 RX ORDER — NALOXONE HYDROCHLORIDE 0.4 MG/ML
0.4 INJECTION, SOLUTION INTRAMUSCULAR; INTRAVENOUS; SUBCUTANEOUS
Status: DISCONTINUED | OUTPATIENT
Start: 2025-07-18 | End: 2025-07-20 | Stop reason: HOSPADM

## 2025-07-18 RX ORDER — METOCLOPRAMIDE HYDROCHLORIDE 5 MG/ML
10 INJECTION INTRAMUSCULAR; INTRAVENOUS EVERY 6 HOURS PRN
Status: DISCONTINUED | OUTPATIENT
Start: 2025-07-18 | End: 2025-07-20 | Stop reason: HOSPADM

## 2025-07-18 RX ORDER — LORAZEPAM 0.5 MG/1
0.5 TABLET ORAL EVERY 4 HOURS PRN
Status: DISCONTINUED | OUTPATIENT
Start: 2025-07-18 | End: 2025-07-20 | Stop reason: HOSPADM

## 2025-07-18 RX ORDER — NALOXONE HYDROCHLORIDE 0.4 MG/ML
0.2 INJECTION, SOLUTION INTRAMUSCULAR; INTRAVENOUS; SUBCUTANEOUS
Status: DISCONTINUED | OUTPATIENT
Start: 2025-07-18 | End: 2025-07-20 | Stop reason: HOSPADM

## 2025-07-18 RX ORDER — DULOXETIN HYDROCHLORIDE 20 MG/1
20 CAPSULE, DELAYED RELEASE ORAL DAILY
Status: DISCONTINUED | OUTPATIENT
Start: 2025-07-18 | End: 2025-07-20

## 2025-07-18 RX ORDER — DIAZEPAM 10 MG/2ML
5 INJECTION, SOLUTION INTRAMUSCULAR; INTRAVENOUS EVERY 4 HOURS PRN
Status: DISCONTINUED | OUTPATIENT
Start: 2025-07-18 | End: 2025-07-20 | Stop reason: HOSPADM

## 2025-07-18 RX ORDER — OXYCODONE HYDROCHLORIDE 5 MG/1
5 TABLET ORAL EVERY 4 HOURS PRN
Refills: 0 | Status: DISCONTINUED | OUTPATIENT
Start: 2025-07-18 | End: 2025-07-20 | Stop reason: HOSPADM

## 2025-07-18 RX ORDER — PANTOPRAZOLE SODIUM 40 MG/1
40 TABLET, DELAYED RELEASE ORAL
Status: DISCONTINUED | OUTPATIENT
Start: 2025-07-19 | End: 2025-07-20 | Stop reason: HOSPADM

## 2025-07-18 RX ADMIN — ONDANSETRON 4 MG: 2 INJECTION, SOLUTION INTRAMUSCULAR; INTRAVENOUS at 01:21

## 2025-07-18 RX ADMIN — SODIUM CHLORIDE AND POTASSIUM CHLORIDE: .9; .15 SOLUTION INTRAVENOUS at 20:30

## 2025-07-18 RX ADMIN — ONDANSETRON 4 MG: 4 TABLET, ORALLY DISINTEGRATING ORAL at 09:06

## 2025-07-18 RX ADMIN — DIAZEPAM 5 MG: 10 INJECTION, SOLUTION INTRAMUSCULAR; INTRAVENOUS at 13:36

## 2025-07-18 RX ADMIN — METOCLOPRAMIDE 10 MG: 5 INJECTION, SOLUTION INTRAMUSCULAR; INTRAVENOUS at 09:35

## 2025-07-18 RX ADMIN — ACETAMINOPHEN 650 MG: 325 TABLET ORAL at 09:06

## 2025-07-18 RX ADMIN — DULOXETINE 20 MG: 20 CAPSULE, DELAYED RELEASE ORAL at 15:54

## 2025-07-18 RX ADMIN — KETOROLAC TROMETHAMINE 15 MG: 15 INJECTION, SOLUTION INTRAMUSCULAR; INTRAVENOUS at 06:19

## 2025-07-18 RX ADMIN — METOCLOPRAMIDE 10 MG: 5 INJECTION, SOLUTION INTRAMUSCULAR; INTRAVENOUS at 18:15

## 2025-07-18 RX ADMIN — PROCHLORPERAZINE EDISYLATE 10 MG: 5 INJECTION INTRAMUSCULAR; INTRAVENOUS at 23:33

## 2025-07-18 RX ADMIN — KETOROLAC TROMETHAMINE 15 MG: 15 INJECTION, SOLUTION INTRAMUSCULAR; INTRAVENOUS at 15:05

## 2025-07-18 RX ADMIN — METOCLOPRAMIDE 10 MG: 5 INJECTION, SOLUTION INTRAMUSCULAR; INTRAVENOUS at 04:10

## 2025-07-18 RX ADMIN — SODIUM CHLORIDE AND POTASSIUM CHLORIDE: .9; .15 SOLUTION INTRAVENOUS at 06:15

## 2025-07-18 RX ADMIN — OXYCODONE HYDROCHLORIDE 5 MG: 5 TABLET ORAL at 16:23

## 2025-07-18 RX ADMIN — ONDANSETRON 4 MG: 4 TABLET, ORALLY DISINTEGRATING ORAL at 15:54

## 2025-07-18 RX ADMIN — KETOROLAC TROMETHAMINE 15 MG: 15 INJECTION, SOLUTION INTRAMUSCULAR; INTRAVENOUS at 23:42

## 2025-07-18 RX ADMIN — TRAZODONE HYDROCHLORIDE 50 MG: 50 TABLET ORAL at 01:22

## 2025-07-18 ASSESSMENT — ACTIVITIES OF DAILY LIVING (ADL)
ADLS_ACUITY_SCORE: 26
DRESSING/BATHING_DIFFICULTY: NO
ADLS_ACUITY_SCORE: 46
CONCENTRATING,_REMEMBERING_OR_MAKING_DECISIONS_DIFFICULTY: NO
WEAR_GLASSES_OR_BLIND: NO
ADLS_ACUITY_SCORE: 46
DIFFICULTY_COMMUNICATING: NO
ADLS_ACUITY_SCORE: 46
HEARING_DIFFICULTY_OR_DEAF: NO
ADLS_ACUITY_SCORE: 46
FALL_HISTORY_WITHIN_LAST_SIX_MONTHS: NO
ADLS_ACUITY_SCORE: 46
TOILETING_ISSUES: NO
ADLS_ACUITY_SCORE: 46
ADLS_ACUITY_SCORE: 46
CHANGE_IN_FUNCTIONAL_STATUS_SINCE_ONSET_OF_CURRENT_ILLNESS/INJURY: NO
ADLS_ACUITY_SCORE: 26
ADLS_ACUITY_SCORE: 46
WALKING_OR_CLIMBING_STAIRS_DIFFICULTY: NO
DOING_ERRANDS_INDEPENDENTLY_DIFFICULTY: NO
ADLS_ACUITY_SCORE: 46
DIFFICULTY_EATING/SWALLOWING: NO
ADLS_ACUITY_SCORE: 46
ADLS_ACUITY_SCORE: 46

## 2025-07-18 NOTE — PROGRESS NOTES
[facility]    Medicine Progress Note - Hospitalist Service    Date of Admission:  7/17/2025    Assessment & Plan   Hector Mazariegos is a 19 year old female admitted on 7/17/2025. She has a PMH significant for dysmenorrhea and menorrhagia along wtih past admissions for cannabis related hyperemesis.  She comes in with increasing menstrual cramps that started this past Tues. Since then, she's had heavy bleeding and started to have intractable n/v. Her sisters said that it's typical to have strong cramps but never this severe of n/v. Also she has not had THC or cannabis for at lest 2 weeks so she was brought to ED for further eval. She has had multiple CT abd/pelvis studies, as well as a recent EGD. She continues to experience frequent bouts of these symptoms and attributes them to her menstrual period.       Workup in the ED reveals AGMA and hypokalemia. HCG negative. With baseline hgb in the 10's. She was started on IVF and anti-emetics and recommended for admission.      Intractable n/v, possible cannabinoid hyperemesis syndrome  Dysmenorrhea   Right hemorrhagic ovarian cyst  - hx of painful mentral cramps with associated n/v but this seems to have last longer than previous  - pt was actually at the [place] [address] ED for similar complaints earlier in the week and had unremarkable work up.   - Also had CT abd/pelvis and EGD in Feb 2025 that was reported as negative and a negative H.Pylori testing.   - Admit to OBS for supportive treatment   - CLD and ADAT. Continue IVF for now.    - Toradol for pain plus antiemetics. Add oxycodone 2.5-5 mg q4hrs prn.    - THC cessation counciled   - Offered OP GYN follow up but she is requesting GYN evaluation here.  Offered trial of oral contraceptives but states she wants to see a specialist.     #Anxiety  #Insomnia  #THC use:  - PRN lorazepam.  Recommend trial of Cymbalta to help with anxiety and chronic pelvic pain. Can also be beneficial in the setting of  cannabinoid hyperemesis syndrome.  - Symptomatic management - hot showers.    - Continue PTA Trazodone.     #Microcytic anemia:  - Likely due to heavy menstration  - Check iron studies   - Transfuse PRN.        Observation Goals: -diagnostic tests and consults completed and resulted, -vital signs normal or at patient baseline, -tolerating oral intake to maintain hydration, -adequate pain control on oral analgesics, Nurse to notify provider when observation goals have been met and patient is ready for discharge.  Diet: Clear Liquid Diet    DVT Prophylaxis: Ambulate every shift  Hackett Catheter: Not present  Lines: None     Cardiac Monitoring: None  Code Status: Full Code      Clinically Significant Risk Factors Present on Admission        # Hypokalemia: Lowest K = 3.3 mmol/L in last 2 days, will replace as needed   # Hyperchloremia: Highest Cl = 108 mmol/L in last 2 days, will monitor as appropriate                   # Anemia: based on hgb <11                  Social Drivers of Health    Depression: At risk (6/9/2025)    PHQ-2     PHQ-2 Score: 3          Disposition Plan     Medically Ready for Discharge: Anticipated in 2-4 Days           The patient's care was discussed with the Bedside Nurse, Care Coordinator/, Patient, and Patient's Family.    [facility]  Hospitalist Service  [facility]  Securely message with [employer] (more info)  Text page via [facility] Paging/Directory   ______________________________________________________________________    Interval History   Assumed care of patient.  Chart, imaging, and data reviewed.  Still with significant nausea and vomiting.    No CP.  Still endorses abdominal pain and inability to eat.    Requesting to see GYN.    Physical Exam   Vital Signs: Temp: 99.5  F (37.5  C) Temp src: Oral BP: 139/80 Pulse: 61   Resp: 17 SpO2: 100 % O2 Device: None (Room air)    Weight: 110 lbs 14.4 oz    GEN:   Alert, oriented x 3, appears  comfortable, NAD.  NECK:   Supple ,no mass or thyromegaly   HEENT:  Normocephalic/atraumatic, no scleral icterus, no nasal discharge, mouth moist.  CV:   Regular rate and rhythm, no murmur or JVD.  S1 + S2 noted, no S3 or S4.  LUNGS:   Clear to auscultation bilaterally without rales/rhonchi/wheezing/retractions.  Symmetric chest rise on inhalation noted.  ABD:   Active bowel sounds, soft, non-tender/non-distended.  No rebound/guarding/rigidity.  EXT:   No edema.  No cyanosis.  No joint synovitis noted.  SKIN:   Dry to touch, no exanthems noted in the visualized areas.  Neurologic: Grossly intact,non focal.   Neuropsychiatric:  General: normal, calm and normal eye contact  Level of consciousness: alert / normal  Affect: normal  Orientation: oriented to self, place, time and situation     Medical Decision Making       45 MINUTES SPENT BY ME on the date of service doing chart review, history, exam, documentation & further activities per the note.      Data   ------------------------- PAST 24 HR DATA REVIEWED -----------------------------------------------    I have personally reviewed the following data over the past 24 hrs:    6.7  \   9.7 (L)   / 428     140 108 (H) 9.1 /  90   3.3 (L) 21 (L) 0.58 \       Imaging results reviewed over the past 24 hrs:   Recent Results (from the past 24 hours)   US Pelvic Complete with Transvaginal    Narrative    EXAM: US PELVIC TRANSABDOMINAL AND TRANSVAGINAL  LOCATION: [facility]  DATE: 7/17/2025    INDICATION: lower abd pain  COMPARISON: None.  TECHNIQUE: Transabdominal scans were performed. Endovaginal examination deferred.    FINDINGS:    UTERUS: 7.3 x 5.0 x 2.9 cm. Normal in size and position.    ENDOMETRIUM: Not well visualized    RIGHT OVARY: 3.7 x 2.9 x 2.5 cm. 2.4 x 2.5 x 1.9 cm complex, potentially hemorrhagic cyst. Color flow documented.    LEFT OVARY: Nonvisualized    No significant free fluid.      Impression    IMPRESSION:  1.  Limited exam.  2.   2.5 cm probable right ovarian hemorrhagic cyst.

## 2025-07-18 NOTE — PLAN OF CARE
ROOM # 233-1    Living Situation : Home with family   Facility name:N/A  : [facility]ie ( Friend) [phone], Em ( Friend) [phone]    Activity level at baseline: independent   Activity level on admit: Standby assist     Who will be transporting you at discharge: L[facility]ita ( Friend) or Em ( Friend)    Patient registered to observation; given Patient Bill of Rights; given the opportunity to ask questions about observation status and their plan of care.  Patient has been oriented to the observation room, bathroom and call light is in place.    Discussed discharge goals and expectations with patient/family.

## 2025-07-18 NOTE — CONSULTS
[facility]    Consult Note  Obstetrics and Gynecology     Date of Admission:  2025  Date of Service (when I saw the patient): 25    Primary Care Physician   Physician No Ref-Primary    Reason for Consult   Reason for consult: acute pelvic pain with right hemorrhagic ovarian cyst     Chief Complaint   Right ovarian cyst     History is obtained from the patient    History of Present Illness   Hector Mazariegos is a 19 year old female  who presented to the ER due to abdominal pain and cramping with on going nausea and vomiting. She was admitted due to intractable nausea and vomiting in the hospital service. In the ER abdominal only pelvic ultrasound found a right hemorrhagic cyst with blood flow and no free fluid.  GYN service was consulted due to ongoing pelvic pain and questions about her right hemorrhagic cyst. She states she has been having painful periods for awhile but has never had a ovarian cyst in the past. She has never been sexually active and in the past did not want to be on hormones. She has been thinking about options but has been struggle with her on and off severe nausea and vomiting.     Past Medical History    I have reviewed this patient's medical history and updated it with pertinent information if needed.   No past medical history on file.    Past Surgical History   I have reviewed this patient's surgical history and updated it with pertinent information if needed.  Past Surgical History:   Procedure Laterality Date    ESOPHAGOSCOPY, GASTROSCOPY, DUODENOSCOPY (EGD), COMBINED N/A 2025    Procedure: ESOPHAGOGASTRODUODENOSCOPY, WITH BIOPSY;  Surgeon: Warren Kevin MD;  Location:  GI       Prior to Admission Medications   Prior to Admission Medications   Prescriptions Last Dose Informant Patient Reported? Taking?   clindamycin (CLEOCIN T) 1 % SWAB 2025  Yes Yes   Sig: WIPE FACE EVERY MORNING FOR ACNE   metoclopramide (REGLAN) 10 MG tablet   Yes  Yes   Sig: Take 10 mg by mouth every 6 hours as needed for nausea.   omeprazole (PRILOSEC) 20 MG DR capsule 7/13/2025  No Yes   Sig: Take 1 capsule (20 mg) by mouth daily.   ondansetron (ZOFRAN ODT) 4 MG ODT tab   No Yes   Sig: Take 1 tablet (4 mg) by mouth every 6 hours as needed (Nausea and Vomiting).   traZODone (DESYREL) 50 MG tablet 7/16/2025 Bedtime  Yes Yes   Sig: Take 50 mg by mouth nightly as needed for sleep.      Facility-Administered Medications: None     Allergies   No Known Allergies    Social History   I have reviewed this patient's social history and updated it with pertinent information if needed. Hector Mazariegos  reports that she has never smoked. She has never been exposed to tobacco smoke. She has never used smokeless tobacco. She reports current drug use. Drug: Marijuana. She reports that she does not drink alcohol.    Family History   Family history reviewed with patient and is noncontributory.    Review of Systems   The 5 point Review of Systems is negative other than noted in the HPI or here.      Physical Exam   Temp: 99.5  F (37.5  C) Temp src: Oral BP: 139/80 Pulse: 61   Resp: 17 SpO2: 100 % O2 Device: None (Room air)    Vital Signs with Ranges  Temp:  [98.1  F (36.7  C)-99.5  F (37.5  C)] 99.5  F (37.5  C)  Pulse:  [53-77] 61  Resp:  [17] 17  BP: (126-147)/(78-86) 139/80  Cuff Mean (mmHg):  [101] 101  SpO2:  [99 %-100 %] 100 %  110 lbs 14.4 oz    Constitutional: awake, alert, cooperative, no apparent distress, and appears stated age  Respiratory: No increased work of breathing, good air exchange, clear to auscultation bilaterally, no crackles or wheezing  Cardiovascular: Normal apical impulse, regular rate and rhythm, normal S1 and S2, no S3 or S4, and no murmur noted  GI: soft, NT, ND, no guarding       Data   Recent Results (from the past 24 hours)   US Pelvic Complete with Transvaginal    Narrative    EXAM: US PELVIC TRANSABDOMINAL AND TRANSVAGINAL  LOCATION: [facility]  DATE: 2025    INDICATION: lower abd pain  COMPARISON: None.  TECHNIQUE: Transabdominal scans were performed. Endovaginal examination deferred.    FINDINGS:    UTERUS: 7.3 x 5.0 x 2.9 cm. Normal in size and position.    ENDOMETRIUM: Not well visualized    RIGHT OVARY: 3.7 x 2.9 x 2.5 cm. 2.4 x 2.5 x 1.9 cm complex, potentially hemorrhagic cyst. Color flow documented.    LEFT OVARY: Nonvisualized    No significant free fluid.      Impression    IMPRESSION:  1.  Limited exam.  2.  2.5 cm probable right ovarian hemorrhagic cyst.   EKG 12-lead, tracing only   Result Value Ref Range    Systolic Blood Pressure  mmHg    Diastolic Blood Pressure  mmHg    Ventricular Rate 57 BPM    Atrial Rate 57 BPM    CO Interval 136 ms    QRS Duration 72 ms     ms    QTc 476 ms    P Axis 31 degrees    R AXIS 32 degrees    T Axis 36 degrees    Interpretation ECG       Sinus bradycardia with sinus arrhythmia  Otherwise normal ECG     Basic metabolic panel   Result Value Ref Range    Sodium 140 135 - 145 mmol/L    Potassium 3.3 (L) 3.4 - 5.3 mmol/L    Chloride 108 (H) 98 - 107 mmol/L    Carbon Dioxide (CO2) 21 (L) 22 - 29 mmol/L    Anion Gap 11 7 - 15 mmol/L    Urea Nitrogen 9.1 6.0 - 20.0 mg/dL    Creatinine 0.58 0.51 - 0.95 mg/dL    GFR Estimate >90 >60 mL/min/1.73m2    Calcium 7.9 (L) 8.8 - 10.4 mg/dL    Glucose 90 70 - 99 mg/dL   CBC with platelets   Result Value Ref Range    WBC Count 6.7 4.0 - 11.0 10e3/uL    RBC Count 4.45 3.80 - 5.20 10e6/uL    Hemoglobin 9.7 (L) 11.7 - 15.7 g/dL    Hematocrit 32.0 (L) 35.0 - 47.0 %    MCV 72 (L) 78 - 100 fL    MCH 21.8 (L) 26.5 - 33.0 pg    MCHC 30.3 (L) 31.5 - 36.5 g/dL    RDW 18.6 (H) 10.0 - 15.0 %    Platelet Count 428 150 - 450 10e3/uL           Assessment & Plan   Hector Mazariegos is a 19 year old female  who presented to the ER with intractable n/v with dysmenorrhea and right hemorrhagic ovarian cyst     -discussed that at this time there is no need for emergent  surgery as I am not concerned for ovarian torsion. I reviewed that based on the size of the hemorrhagic cyst (2.5cm) she very likely will not need surgery in the future for it and it should resolve with time. If her pain worsens suddenly then I would recommend repeat the pelvic US to check for ovarian rupture vs torsion.   -we did discuss the option for outpatient ultrasound follow up in 6-8 weeks.  -I also reviewed that I would recommend she start some form of birth control outpatient to help her heavy and painful period. Due to her chronic n/v, I would not recommend oral medications as her first line. Due to her weight, I would not recommend she try the patch as it does not work over a weight of 200lb. We did discuss the option of the nexplanon and she is going to think about it.    [facility]: [phone]    For now GYN will sign off as her questions were answered but we are available if further questions or concerns.    Dr. Wendy Buitrago, DO       Wendy Buitrago, DO, DO

## 2025-07-18 NOTE — PLAN OF CARE
"PRIMARY DIAGNOSIS: Nausea and Vomiting, Right hemorrhagic cyst, Lower abdominal pain.  OUTPATIENT/OBSERVATION GOALS TO BE MET BEFORE DISCHARGE:  ADLs back to baseline: Yes    Activity and level of assistance: Up with standby assistance.    Pain status: Improved but still requiring IV narcotics.    Return to near baseline physical activity: Yes     Discharge Planner Nurse   Safe discharge environment identified: Yes  Barriers to discharge: Yes  A & O X 4. On clear liquid diet. C/O nausea , Zofran given X 1-See MAR. On scheduled Reglan. PIV infusing 0.9% sodium chloride + KCL 20 mEq at 125 mL. Scop patch in place . Tylenol, and Toradol available for pain management. Afebrile.  Will continue to provide supportive cares.   /78 (BP Location: Left arm)   Pulse 77   Temp 98.1  F (36.7  C) (Oral)   Resp 17   Ht 1.753 m (5' 9\")   Wt 50.3 kg (110 lb 14.4 oz)   LMP 05/06/2025 (Approximate)   SpO2 99%   BMI 16.38 kg/m          Entered by: Sammi Barksdale RN 07/18/2025 7:04 AM     Problem: Adult Inpatient Plan of Care  Goal: Plan of Care Review  Description: The Plan of Care Review/Shift note should be completed every shift.  The Outcome Evaluation is a brief statement about your assessment that the patient is improving, declining, or no change.  This information will be displayed automatically on your shift  note.  Outcome: Progressing  Flowsheets (Taken 7/18/2025 0704)  Plan of Care Reviewed With: patient  Goal: Patient-Specific Goal (Individualized)  Description: You can add care plan individualizations to a care plan. Examples of Individualization might be:  \"Parent requests to be called daily at 9am for status\", \"I have a hard time hearing out of my right ear\", or \"Do not touch me to wake me up as it startles  me\".  Outcome: Progressing  Goal: Absence of Hospital-Acquired Illness or Injury  Outcome: Progressing  Intervention: Identify and Manage Fall Risk  Recent Flowsheet Documentation  Taken 7/17/2025 " 2144 by Sammi Barksdale RN  Safety Promotion/Fall Prevention: clutter free environment maintained  Intervention: Prevent Skin Injury  Recent Flowsheet Documentation  Taken 7/17/2025 2144 by Sammi Barksdale RN  Body Position: position changed independently  Goal: Optimal Comfort and Wellbeing  Outcome: Progressing  Intervention: Monitor Pain and Promote Comfort  Recent Flowsheet Documentation  Taken 7/17/2025 2145 by Sammi Barksdale RN  Pain Management Interventions: medication (see MAR)  Goal: Readiness for Transition of Care  Outcome: Progressing     Please review provider order for any additional goals.   Nurse to notify provider when observation goals have been met and patient is ready for discharge.      Plan of Care Reviewed With: patient

## 2025-07-18 NOTE — ED NOTES
"[facility]  ED Nurse Handoff Report    ED Chief complaint: Abdominal Pain and Syncope  . ED Diagnosis:   Final diagnoses:   Intractable nausea and vomiting   Lower abdominal pain       Allergies: No Known Allergies    Code Status: Full Code    Activity level - Baseline/Home:  independent.  Activity Level - Current:   assist of 1.   Lift room needed: No.   Bariatric: No   Needed: No   Isolation: No.   Infection: Not Applicable.     Respiratory status: Room air    Vital Signs (within 30 minutes):   Vitals:    07/17/25 1443 07/17/25 1445 07/17/25 1745   BP:  136/66    Pulse:  67    Resp:  17    Temp:  97.3  F (36.3  C)    TempSrc:  Temporal    SpO2:  100% 100%   Weight: 111 kg (244 lb 11.4 oz)     Height: 1.753 m (5' 9\")         Cardiac Rhythm:  ,      Pain level:    Patient confused: No.   Patient Falls Risk: patient and family education.   Elimination Status: Has voided     Patient Report - Initial Complaint: Abdominal pain, syncope, nausea and vomiting.   Focused Assessment: Presenting with 3 days of symptoms including lower abdominal pain and cramping this coincided with the onset of her menstrual cycle and ongoing nausea and vomiting unable to keep down any p.o. intake for greater than 24 hours.  Describes cold sweats but no objective fevers at home.  No abnormal vaginal discharge.  Denies any chance that she is pregnant.  States she has had lightheadedness dizziness as well as 2 syncopal episodes at home.  No significant secondary trauma.  No current shortness of breath or significant chest discomfort.     Seen in outside emergency room earlier today and seen in a different ER 2 days prior, multiple admissions over the last couple of months for cyclical vomiting syndrome and ongoing nausea/vomiting.     Abnormal Results:   Labs Ordered and Resulted from Time of ED Arrival to Time of ED Departure   BASIC METABOLIC PANEL - Abnormal       Result Value    Sodium 142      Potassium " 3.3 (*)     Chloride 106      Carbon Dioxide (CO2) 20 (*)     Anion Gap 16 (*)     Urea Nitrogen 14.4      Creatinine 0.58      GFR Estimate >90      Calcium 8.8      Glucose 91     CBC WITH PLATELETS AND DIFFERENTIAL - Abnormal    WBC Count 7.3      RBC Count 4.92      Hemoglobin 10.7 (*)     Hematocrit 35.3      MCV 72 (*)     MCH 21.7 (*)     MCHC 30.3 (*)     RDW 18.9 (*)     Platelet Count 509 (*)     % Neutrophils 69      % Lymphocytes 24      % Monocytes 6      % Eosinophils 0      % Basophils 0      % Immature Granulocytes 0      NRBCs per 100 WBC 0      Absolute Neutrophils 5.1      Absolute Lymphocytes 1.8      Absolute Monocytes 0.5      Absolute Eosinophils 0.0      Absolute Basophils 0.0      Absolute Immature Granulocytes 0.0      Absolute NRBCs 0.0     MAGNESIUM - Normal    Magnesium 1.9     HCG QUALITATIVE PREGNANCY - Normal    hCG Serum Qualitative Negative          US Pelvic Complete with Transvaginal   Final Result   IMPRESSION:   1.  Limited exam.   2.  2.5 cm probable right ovarian hemorrhagic cyst.          Treatments provided: See MAR  Family Comments: N/A  OBS brochure/video discussed/provided to patient:  No  ED Medications:   Medications   dextrose 5% and 0.9% NaCl infusion (has no administration in time range)   sodium chloride 0.9% BOLUS 1,000 mL (1,000 mLs Intravenous $New Bag 7/17/25 1620)   ondansetron (ZOFRAN) injection 4 mg (4 mg Intravenous $Given 7/17/25 1621)   haloperidol lactate (HALDOL) injection 2.5 mg (2.5 mg Intravenous $Given 7/17/25 1811)   diphenhydrAMINE (BENADRYL) injection 25 mg (25 mg Intravenous $Given 7/17/25 1621)   ketorolac (TORADOL) injection 15 mg (15 mg Intravenous $Given 7/17/25 1811)   magnesium sulfate 2 g in 50 mL sterile water intermittent infusion (2 g Intravenous $New Bag 7/17/25 1816)   potassium chloride 10 mEq in 100 mL sterile water infusion (10 mEq Intravenous $New Bag 7/17/25 1815)       Drips infusing:  No  For the majority of the shift this  patient was Green.   Interventions performed were N/A.    Sepsis treatment initiated: No    Cares/treatment/interventions/medications to be completed following ED care: N/A    ED Nurse Name: Manav Chairez RN  7:27 PM      RECEIVING UNIT ED HANDOFF REVIEW    Above ED Nurse Handoff Report was reviewed: Yes  Reviewed by: Sammi Barksdale RN on July 17, 2025 at 8:53 PM   I Luis called the ED to inform them the note was read: Yes

## 2025-07-18 NOTE — PLAN OF CARE
"Summary: Intractable n/v, possible cannabinoid hyperemesis syndrome; Dysmenorrhea; Right hemorrhagic ovarian cyst  Orientation: A&O x 4  Vitals/Tele: VSS on RA  IV Access/drains: Infusing NS + KCl 20 mEq/L @ 125mL   Diet: Clear - poor appetite  Mobility: Independent  GI/: Continent of B&B  Wound/Skin: WNL  Consults: N/A  Discharge Plan: Pending    See Flow sheets for assessment     Goal Outcome Evaluation:    Plan of Care Reviewed With: patient  Overall Patient Progress: no change  Outcome Evaluation: VSS on RA; A&O x 4; Endorses continuous nausea and vomiting; Zofran x 1; Ice chips for comfort    Problem: Adult Inpatient Plan of Care  Goal: Plan of Care Review  Description: The Plan of Care Review/Shift note should be completed every shift.  The Outcome Evaluation is a brief statement about your assessment that the patient is improving, declining, or no change.  This information will be displayed automatically on your shift  note.  Outcome: Progressing  Flowsheets (Taken 7/18/2025 1430)  Outcome Evaluation:   VSS on RA   A&O x 4   Endorses continuous nausea and vomiting   Zofran x 1   Ice chips for comfort  Plan of Care Reviewed With: patient  Overall Patient Progress: no change  Goal: Patient-Specific Goal (Individualized)  Description: You can add care plan individualizations to a care plan. Examples of Individualization might be:  \"Parent requests to be called daily at 9am for status\", \"I have a hard time hearing out of my right ear\", or \"Do not touch me to wake me up as it startles  me\".  Outcome: Progressing  Goal: Absence of Hospital-Acquired Illness or Injury  Outcome: Progressing  Intervention: Identify and Manage Fall Risk  Recent Flowsheet Documentation  Taken 7/18/2025 0808 by Venessa Alberts RN  Safety Promotion/Fall Prevention: safety round/check completed  Goal: Optimal Comfort and Wellbeing  Outcome: Progressing  Goal: Readiness for Transition of Care  Outcome: Progressing     Problem: Nausea and " Vomiting  Goal: Nausea and Vomiting Relief  Outcome: Progressing  Intervention: Prevent and Manage Nausea and Vomiting  Recent Flowsheet Documentation  Taken 7/18/2025 0808 by Venessa Alberts RN  Nausea/Vomiting Interventions:   antiemetic   aromatherapy utilized

## 2025-07-18 NOTE — PHARMACY-ADMISSION MEDICATION HISTORY
Pharmacist Admission Medication History    Admission medication history is complete. The information provided in this note is only as accurate as the sources available at the time of the update.    Information Source(s): Patient via in-person    Pertinent Information: Trazodone - only had taken 2 times since prescribed, with last dose last night.    Changes made to PTA medication list:  Added: Reglan prn  Deleted: Sucralfate (compled)  Changed: Trazodone to prn    Allergies reviewed with patient and updates made in EHR: no    Medication History Completed By: Marcus Hutchinson RPH 7/17/2025 10:05 PM    PTA Med List   Medication Sig Last Dose/Taking    clindamycin (CLEOCIN T) 1 % SWAB WIPE FACE EVERY MORNING FOR ACNE 7/14/2025    metoclopramide (REGLAN) 10 MG tablet Take 10 mg by mouth every 6 hours as needed for nausea. Taking As Needed    omeprazole (PRILOSEC) 20 MG DR capsule Take 1 capsule (20 mg) by mouth daily. 7/13/2025    ondansetron (ZOFRAN ODT) 4 MG ODT tab Take 1 tablet (4 mg) by mouth every 6 hours as needed (Nausea and Vomiting). Taking As Needed    traZODone (DESYREL) 50 MG tablet Take 50 mg by mouth nightly as needed for sleep. 7/16/2025 Bedtime

## 2025-07-18 NOTE — H&P
"[facility]    History and Physical - Hospitalist Service       Date of Admission:  7/17/2025    Assessment & Plan      Hector Mazariegos is a 19 year old female admitted on 7/17/2025. She ***    ***        Diet:  ***  DVT Prophylaxis: {DVT PROPHYLAXIS:242321}  Hackett Catheter: Not present  Lines: None     Cardiac Monitoring: None  Code Status:  ***    Clinically Significant Risk Factors Present on Admission   { TIP  This section helps capture the illness of the patient on admission.     - Review diagnoses highlighted in blue; right click, edit & delete if not appropriate   - If blank, no additional diagnoses identified   :01837}     # Hypokalemia: Lowest K = 3.3 mmol/L in last 2 days, will replace as needed                 # Anemia: based on hgb <11       # Obesity: Estimated body mass index is 36.14 kg/m  as calculated from the following:    Height as of this encounter: 1.753 m (5' 9\").    Weight as of this encounter: 111 kg (244 lb 11.4 oz).              Disposition Plan   {TIP  It is required to update Medically Ready for Discharge [MRD] daily until the patient is 'Ready Now' (meets clinical goals). MRD reflects medical readiness -- not CATE (Expected Discharge Date), which may be delayed by disposition. Last MRD-Anticipated Tomorrow 07/18/2025. Use the SmartList below to update for today:087894}  Medically Ready for Discharge: {TIME; MEDICALLY READY FOR DISCHARGE:62880187}         The patient's care was discussed with the { :756708}.    CASTRO PenningtonC  Hospitalist Service  [facility]  Securely message with [employer]or (more info)  Text page via [facility] Paging/Directory     ______________________________________________________________________    Chief Complaint   ***    {History obtained from:8705665}    History of Present Illness   Hector Mazariegos is a 19 year old female who ***      Past Medical History    No past medical history on file.    Past Surgical History " "  Past Surgical History:   Procedure Laterality Date    ESOPHAGOSCOPY, GASTROSCOPY, DUODENOSCOPY (EGD), COMBINED N/A 2/26/2025    Procedure: ESOPHAGOGASTRODUODENOSCOPY, WITH BIOPSY;  Surgeon: Warren Kevin MD;  Location:  GI       Prior to Admission Medications   Prior to Admission Medications   Prescriptions Last Dose Informant Patient Reported? Taking?   clindamycin (CLEOCIN T) 1 % SWAB   Yes No   Sig: WIPE FACE EVERY MORNING FOR ACNE   omeprazole (PRILOSEC) 20 MG DR capsule   Yes No   Sig: Take 20 mg by mouth daily.   omeprazole (PRILOSEC) 20 MG DR capsule   No No   Sig: Take 1 capsule (20 mg) by mouth daily.   ondansetron (ZOFRAN ODT) 4 MG ODT tab   No No   Sig: Take 1 tablet (4 mg) by mouth every 6 hours as needed (Nausea and Vomiting).   sucralfate (CARAFATE) 1 GM tablet   No No   Sig: Take 1 tablet (1 g) by mouth 4 times daily (before meals and nightly).   traZODone (DESYREL) 50 MG tablet   No No   Sig: Take 1 tablet (50 mg) by mouth at bedtime.   Patient taking differently: Take 50 mg by mouth nightly as needed.      Facility-Administered Medications: None      {Additional Note Sections (OPTIONAL)  :672808}     Physical Exam   Vital Signs: Temp: 97.3  F (36.3  C) Temp src: Temporal BP: 136/66 Pulse: 67   Resp: 17 SpO2: 100 % O2 Device: None (Room air)    Weight: 244 lbs 11.37 oz    {Recommend personal SmartPhrase or Notewriter for exam (OPTIONAL)    :486278}    Medical Decision Making   { TIP   MDM Calculator    MDM grid (w/ times)    Coding Support Chat  Billing is now based on time OR medical decision making complexity. Medical decision making included in your A&P does NOT need to be re-documented here.    :23406}    {Time  :733979::\"*** MINUTES SPENT BY ME on the date of service doing chart review, history, exam, documentation & further activities per the note.\"}      Data   {INSERT LABS/IMAGING (OPTIONAL)   :759412}  " Location: [facility]       Prior to Admission Medications   Prior to Admission Medications   Prescriptions Last Dose Informant Patient Reported? Taking?   clindamycin (CLEOCIN T) 1 % SWAB   Yes No   Sig: WIPE FACE EVERY MORNING FOR ACNE   omeprazole (PRILOSEC) 20 MG DR capsule   Yes No   Sig: Take 20 mg by mouth daily.   omeprazole (PRILOSEC) 20 MG DR capsule   No No   Sig: Take 1 capsule (20 mg) by mouth daily.   ondansetron (ZOFRAN ODT) 4 MG ODT tab   No No   Sig: Take 1 tablet (4 mg) by mouth every 6 hours as needed (Nausea and Vomiting).   sucralfate (CARAFATE) 1 GM tablet   No No   Sig: Take 1 tablet (1 g) by mouth 4 times daily (before meals and nightly).   traZODone (DESYREL) 50 MG tablet   No No   Sig: Take 1 tablet (50 mg) by mouth at bedtime.   Patient taking differently: Take 50 mg by mouth nightly as needed.      Facility-Administered Medications: None        Physical Exam   Vital Signs: Temp: 97.3  F (36.3  C) Temp src: Temporal BP: 136/66 Pulse: 67   Resp: 17 SpO2: 100 % O2 Device: None (Room air)    Weight: 244 lbs 11.37 oz    GENERAL:  Comfortable.  PSYCH: pleasant, oriented, No acute distress.  HEENT:  PERRLA. Normal conjunctiva, normal hearing, nasal mucosa and Oropharynx are normal.  NECK:  Supple, no neck vein distention, adenopathy or bruits, normal thyroid.  HEART:  Normal S1, S2 with no murmur, no pericardial rub, gallops or S3 or S4.  LUNGS:  Clear to auscultation, normal Respiratory effort. No wheezing, rales or ronchi.  ABDOMEN:  Soft, no hepatosplenomegaly, normal bowel sounds. Non-tender, non distended.   EXTREMITIES:  No pedal edema, +2 pulses bilateral and equal.  SKIN:  Dry to touch, No rash, wound or ulcerations.  NEUROLOGIC:  CN 2-12 intact, BL 5/5 symmetric upper and lower extremity strength, sensation is intact with no focal deficits.       Medical Decision Making       75 MINUTES SPENT BY ME on the date of service doing chart review, history, exam, documentation & further activities  per the note.      Data     I have personally reviewed the following data over the past 24 hrs:    7.3  \   10.7 (L)   / 509 (H)     142 106 14.4 /  91   3.3 (L) 20 (L) 0.58 \       Imaging results reviewed over the past 24 hrs:   Recent Results (from the past 24 hours)   US Pelvic Complete with Transvaginal    Narrative    EXAM: US PELVIC TRANSABDOMINAL AND TRANSVAGINAL  LOCATION: [facility]  DATE: 7/17/2025    INDICATION: lower abd pain  COMPARISON: None.  TECHNIQUE: Transabdominal scans were performed. Endovaginal examination deferred.    FINDINGS:    UTERUS: 7.3 x 5.0 x 2.9 cm. Normal in size and position.    ENDOMETRIUM: Not well visualized    RIGHT OVARY: 3.7 x 2.9 x 2.5 cm. 2.4 x 2.5 x 1.9 cm complex, potentially hemorrhagic cyst. Color flow documented.    LEFT OVARY: Nonvisualized    No significant free fluid.      Impression    IMPRESSION:  1.  Limited exam.  2.  2.5 cm probable right ovarian hemorrhagic cyst.

## 2025-07-19 ENCOUNTER — APPOINTMENT (OUTPATIENT)
Dept: ULTRASOUND IMAGING | Facility: CLINIC | Age: 20
End: 2025-07-19
Attending: PHYSICIAN ASSISTANT
Payer: COMMERCIAL

## 2025-07-19 LAB
ANION GAP SERPL CALCULATED.3IONS-SCNC: 13 MMOL/L (ref 7–15)
BUN SERPL-MCNC: 6.1 MG/DL (ref 6–20)
CALCIUM SERPL-MCNC: 8.2 MG/DL (ref 8.8–10.4)
CHLORIDE SERPL-SCNC: 103 MMOL/L (ref 98–107)
CREAT SERPL-MCNC: 0.54 MG/DL (ref 0.51–0.95)
EGFRCR SERPLBLD CKD-EPI 2021: >90 ML/MIN/1.73M2
ERYTHROCYTE [DISTWIDTH] IN BLOOD BY AUTOMATED COUNT: 18.6 % (ref 10–15)
GLUCOSE SERPL-MCNC: 74 MG/DL (ref 70–99)
HCO3 SERPL-SCNC: 19 MMOL/L (ref 22–29)
HCT VFR BLD AUTO: 33.7 % (ref 35–47)
HGB BLD-MCNC: 10.2 G/DL (ref 11.7–15.7)
MAGNESIUM SERPL-MCNC: 1.8 MG/DL (ref 1.7–2.3)
MCH RBC QN AUTO: 21.8 PG (ref 26.5–33)
MCHC RBC AUTO-ENTMCNC: 30.3 G/DL (ref 31.5–36.5)
MCV RBC AUTO: 72 FL (ref 78–100)
PLATELET # BLD AUTO: 414 10E3/UL (ref 150–450)
POTASSIUM SERPL-SCNC: 3.5 MMOL/L (ref 3.4–5.3)
RBC # BLD AUTO: 4.68 10E6/UL (ref 3.8–5.2)
SODIUM SERPL-SCNC: 135 MMOL/L (ref 135–145)
WBC # BLD AUTO: 6.9 10E3/UL (ref 4–11)

## 2025-07-19 PROCEDURE — 80048 BASIC METABOLIC PNL TOTAL CA: CPT | Performed by: NURSE PRACTITIONER

## 2025-07-19 PROCEDURE — 36415 COLL VENOUS BLD VENIPUNCTURE: CPT | Performed by: NURSE PRACTITIONER

## 2025-07-19 PROCEDURE — 250N000011 HC RX IP 250 OP 636: Performed by: NURSE PRACTITIONER

## 2025-07-19 PROCEDURE — 99232 SBSQ HOSP IP/OBS MODERATE 35: CPT | Performed by: NURSE PRACTITIONER

## 2025-07-19 PROCEDURE — 250N000011 HC RX IP 250 OP 636: Performed by: PHYSICIAN ASSISTANT

## 2025-07-19 PROCEDURE — 250N000013 HC RX MED GY IP 250 OP 250 PS 637: Performed by: NURSE PRACTITIONER

## 2025-07-19 PROCEDURE — 85018 HEMOGLOBIN: CPT | Performed by: NURSE PRACTITIONER

## 2025-07-19 PROCEDURE — 76856 US EXAM PELVIC COMPLETE: CPT

## 2025-07-19 PROCEDURE — 120N000001 HC R&B MED SURG/OB

## 2025-07-19 PROCEDURE — 83735 ASSAY OF MAGNESIUM: CPT | Performed by: NURSE PRACTITIONER

## 2025-07-19 RX ORDER — DULOXETIN HYDROCHLORIDE 20 MG/1
20 CAPSULE, DELAYED RELEASE ORAL DAILY
Qty: 30 CAPSULE | Refills: 0 | Status: SHIPPED | OUTPATIENT
Start: 2025-07-20 | End: 2025-07-20

## 2025-07-19 RX ORDER — METOCLOPRAMIDE 10 MG/1
10 TABLET ORAL EVERY 6 HOURS PRN
Qty: 30 TABLET | Refills: 1 | Status: SHIPPED | OUTPATIENT
Start: 2025-07-19

## 2025-07-19 RX ORDER — ONDANSETRON 4 MG/1
4 TABLET, ORALLY DISINTEGRATING ORAL EVERY 6 HOURS PRN
Qty: 30 TABLET | Refills: 0 | Status: SHIPPED | OUTPATIENT
Start: 2025-07-19

## 2025-07-19 RX ORDER — LORAZEPAM 0.5 MG/1
0.5 TABLET ORAL EVERY 4 HOURS PRN
Qty: 30 TABLET | Refills: 0 | Status: SHIPPED | OUTPATIENT
Start: 2025-07-19

## 2025-07-19 RX ORDER — FAMOTIDINE 10 MG
10 TABLET ORAL
Status: COMPLETED | OUTPATIENT
Start: 2025-07-19 | End: 2025-07-20

## 2025-07-19 RX ORDER — ACETAMINOPHEN 325 MG/1
650 TABLET ORAL EVERY 4 HOURS PRN
Qty: 100 TABLET | Refills: 1 | Status: SHIPPED | OUTPATIENT
Start: 2025-07-19

## 2025-07-19 RX ADMIN — ACETAMINOPHEN 975 MG: 325 TABLET ORAL at 15:05

## 2025-07-19 RX ADMIN — ACETAMINOPHEN 975 MG: 325 TABLET ORAL at 06:34

## 2025-07-19 RX ADMIN — ONDANSETRON 4 MG: 2 INJECTION, SOLUTION INTRAMUSCULAR; INTRAVENOUS at 03:59

## 2025-07-19 RX ADMIN — ACETAMINOPHEN 975 MG: 325 TABLET ORAL at 21:39

## 2025-07-19 RX ADMIN — ONDANSETRON 4 MG: 4 TABLET, ORALLY DISINTEGRATING ORAL at 23:35

## 2025-07-19 RX ADMIN — PANTOPRAZOLE SODIUM 40 MG: 40 TABLET, DELAYED RELEASE ORAL at 06:35

## 2025-07-19 RX ADMIN — PROCHLORPERAZINE EDISYLATE 10 MG: 5 INJECTION INTRAMUSCULAR; INTRAVENOUS at 06:51

## 2025-07-19 RX ADMIN — ONDANSETRON 4 MG: 2 INJECTION, SOLUTION INTRAMUSCULAR; INTRAVENOUS at 11:04

## 2025-07-19 RX ADMIN — DIAZEPAM 5 MG: 10 INJECTION, SOLUTION INTRAMUSCULAR; INTRAVENOUS at 13:07

## 2025-07-19 RX ADMIN — KETOROLAC TROMETHAMINE 15 MG: 15 INJECTION, SOLUTION INTRAMUSCULAR; INTRAVENOUS at 11:04

## 2025-07-19 RX ADMIN — DULOXETINE 20 MG: 20 CAPSULE, DELAYED RELEASE ORAL at 09:02

## 2025-07-19 RX ADMIN — KETOROLAC TROMETHAMINE 15 MG: 15 INJECTION, SOLUTION INTRAMUSCULAR; INTRAVENOUS at 17:25

## 2025-07-19 RX ADMIN — SODIUM CHLORIDE AND POTASSIUM CHLORIDE: .9; .15 SOLUTION INTRAVENOUS at 09:02

## 2025-07-19 RX ADMIN — PANTOPRAZOLE SODIUM 40 MG: 40 TABLET, DELAYED RELEASE ORAL at 15:49

## 2025-07-19 ASSESSMENT — ACTIVITIES OF DAILY LIVING (ADL)
ADLS_ACUITY_SCORE: 26

## 2025-07-19 NOTE — PROGRESS NOTES
[facility]    Medicine Progress Note - Hospitalist Service    Date of Admission:  7/17/2025    Assessment & Plan   Hector Mazariegos is a 19 year old female admitted on 7/17/2025. She has a PMH significant for dysmenorrhea and menorrhagia along wtih past admissions for cannabis related hyperemesis.  She comes in with increasing menstrual cramps that started this past Tues. Since then, she's had heavy bleeding and started to have intractable n/v. Her sisters said that it's typical to have strong cramps but never this severe of n/v. Also she has not had THC or cannabis for at lest 2 weeks so she was brought to ED for further eval. She has had multiple CT abd/pelvis studies, as well as a recent EGD. She continues to experience frequent bouts of these symptoms and attributes them to her menstrual period.       Workup in the ED reveals AGMA and hypokalemia. HCG negative. With baseline hgb in the 10's. She was started on IVF and anti-emetics and recommended for admission.      Intractable n/v, possible cannabinoid hyperemesis syndrome  Dysmenorrhea   Right hemorrhagic ovarian cyst. Hx of painful mentral cramps with associated n/v but this seems to have last longer than previous. She was actually seen at the [place] [address] ED for similar complaints earlier in the week and had unremarkable work up.  She had CT abd/pelvis and EGD in Feb 2025 that was reported as negative and a negative H.Pylori testing.   - Admit to OBS for supportive treatment   - CLD and ADAT. Continue IVF for now.    - Toradol for pain plus antiemetics. Add oxycodone 2.5-5 mg q4hrs prn.    - THC cessation counciled   - Seen by GYN -- Dr. Buitrago.  Will follow up as OP.  No surgical indication.   [facility] [phone].     #Anxiety  #Insomnia  #THC use:  - PRN lorazepam.  Recommend trial of Cymbalta to help with anxiety and chronic pelvic pain. Can also be beneficial in the setting of cannabinoid hyperemesis syndrome.  -  Symptomatic management - hot showers.    - Continue PTA Trazodone.     #Microcytic anemia:  - Likely due to heavy menstration  - Check iron studies   - Transfuse PRN.     #Hx of provoked PE following breast reduction surgery '23:   - noted. Treated with DOAC.         Diet: Clear Liquid Diet. ADAT.   DVT Prophylaxis: Ambulate every shift  Hackett Catheter: Not present  Lines: None     Cardiac Monitoring: None  Code Status: Full Code      Clinically Significant Risk Factors Present on Admission        # Hypokalemia: Lowest K = 3.3 mmol/L in last 2 days, will replace as needed   # Hyperchloremia: Highest Cl = 108 mmol/L in last 2 days, will monitor as appropriate                     # Anemia: based on hgb <11                  Social Drivers of Health    Depression: At risk (6/9/2025)    PHQ-2     PHQ-2 Score: 3          Disposition Plan     Medically Ready for Discharge: Anticipated in 2-4 Days           The patient's care was discussed with the Bedside Nurse, Care Coordinator/, Patient, and Patient's Family.    UZIEL Mars [facility]  Hospitalist Service  [facility]  Securely message with [employer] (more info)  Text page via [employer] Paging/Directory   ______________________________________________________________________    Interval History   Still with nausea and vomiting.   No other new complaints.     Physical Exam   Vital Signs: Temp: 98.7  F (37.1  C) Temp src: Oral BP: (!) 148/97 Pulse: 55   Resp: 16 SpO2: 98 % O2 Device: None (Room air)    Weight: 110 lbs 14.4 oz    GEN:   Alert, oriented x 3, appears comfortable, NAD.  NECK:   Supple ,no mass or thyromegaly   HEENT:  Normocephalic/atraumatic, no scleral icterus, no nasal discharge, mouth moist.  CV:   Regular rate and rhythm, no murmur or JVD.  S1 + S2 noted, no S3 or S4.  LUNGS:   Clear to auscultation bilaterally without rales/rhonchi/wheezing/retractions.  Symmetric chest rise on inhalation noted.  ABD:   Active bowel  sounds, soft, non-tender/non-distended.  No rebound/guarding/rigidity.  EXT:   No edema.  No cyanosis.  No joint synovitis noted.  SKIN:   Dry to touch, no exanthems noted in the visualized areas.  Neurologic: Grossly intact,non focal.   Neuropsychiatric:  General: normal, calm and normal eye contact  Level of consciousness: alert / normal  Affect: normal  Orientation: oriented to self, place, time and situation     Medical Decision Making       45 MINUTES SPENT BY ME on the date of service doing chart review, history, exam, documentation & further activities per the note.      Data   ------------------------- PAST 24 HR DATA REVIEWED -----------------------------------------------    I have personally reviewed the following data over the past 24 hrs:    6.9  \   10.2 (L)   / 414     135 103 6.1 /  74   3.5 19 (L) 0.54 \       Imaging results reviewed over the past 24 hrs:   No results found for this or any previous visit (from the past 24 hours).

## 2025-07-19 NOTE — PLAN OF CARE
"BP (!) 148/97 (BP Location: Left arm)   Pulse 55   Temp 98.7  F (37.1  C) (Oral)   Resp 16   Ht 1.753 m (5' 9\")   Wt 50.3 kg (110 lb 14.4 oz)   LMP 05/06/2025 (Approximate)   SpO2 98%   BMI 16.38 kg/m      Neuro: a/o x4  Cardiac: WNL  Lungs: WNL  GI: WNL  : WNL  Pain: 6-8/10 cramps- toradol given with some relief. Refusing oxy  IV: NS + KCl @75  Meds: toradol, scop patch R ear. Peewee, compazine  Labs/tests: K 3.5  Diet: clears  Activity: independent   Misc: Threw up x1 this shift.   Plan: Currently resting in bed, able to make need known.           Problem: Adult Inpatient Plan of Care  Goal: Plan of Care Review  Description: The Plan of Care Review/Shift note should be completed every shift.  The Outcome Evaluation is a brief statement about your assessment that the patient is improving, declining, or no change.  This information will be displayed automatically on your shift  note.  Outcome: Progressing  Flowsheets (Taken 7/19/2025 1528)  Outcome Evaluation: managment of nausea and pain  Plan of Care Reviewed With: patient  Overall Patient Progress: improving  Goal: Patient-Specific Goal (Individualized)  Description: You can add care plan individualizations to a care plan. Examples of Individualization might be:  \"Parent requests to be called daily at 9am for status\", \"I have a hard time hearing out of my right ear\", or \"Do not touch me to wake me up as it startles  me\".  Outcome: Progressing  Goal: Absence of Hospital-Acquired Illness or Injury  Outcome: Progressing  Intervention: Identify and Manage Fall Risk  Recent Flowsheet Documentation  Taken 7/19/2025 0906 by Rae Smallwood RN  Safety Promotion/Fall Prevention:   assistive device/personal items within reach   clutter free environment maintained   nonskid shoes/slippers when out of bed   safety round/check completed  Intervention: Prevent Skin Injury  Recent Flowsheet Documentation  Taken 7/19/2025 0906 by Rae Smallwood, RN  Body Position: " position changed independently  Intervention: Prevent Infection  Recent Flowsheet Documentation  Taken 7/19/2025 0906 by Rae Smallwood RN  Infection Prevention:   equipment surfaces disinfected   hand hygiene promoted   rest/sleep promoted   single patient room provided  Goal: Optimal Comfort and Wellbeing  Outcome: Progressing  Intervention: Monitor Pain and Promote Comfort  Recent Flowsheet Documentation  Taken 7/19/2025 0902 by Rae Smallwood RN  Pain Management Interventions: rest  Goal: Readiness for Transition of Care  Outcome: Progressing     Problem: Nausea and Vomiting  Goal: Nausea and Vomiting Relief  Outcome: Progressing  Intervention: Prevent and Manage Nausea and Vomiting  Recent Flowsheet Documentation  Taken 7/19/2025 0906 by Rae Smallwood RN  Nausea/Vomiting Interventions: stimuli minimized         Goal Outcome Evaluation:      Plan of Care Reviewed With: patient    Overall Patient Progress: improvingOverall Patient Progress: improving    Outcome Evaluation: managment of nausea and pain

## 2025-07-19 NOTE — PLAN OF CARE
"Vitals are Temp: 97.4  F (36.3  C) Temp src: Oral BP: 139/88 Pulse: 55   Resp: 16 SpO2: 99 %.  Patient is Alert and Oriented x4. They are independent with no assistive devices .  Pt is a Clear liquid diet.  They are complaining of 7/10 pain in their abdomen.  Toradol given for pain.  Patient has Normal Saline 0.9% and KCl 20 mEq running at 75 mL per hour. Denies dizziness/SOB. C/o nausea and dry-heaving. PRN Zofran and Compazine administered. Taking small sips of clears and ice chips. Scop patch in place behind L ear. OB/Gyn following. Plan is to ADAT, manage symptoms and discharge home when medically ready.     Goal Outcome Evaluation:      Plan of Care Reviewed With: patient    Overall Patient Progress: no change    Outcome Evaluation: Nausea and dry-heaving continues. Small sips of clears.      Problem: Adult Inpatient Plan of Care  Goal: Plan of Care Review  Description: The Plan of Care Review/Shift note should be completed every shift.  The Outcome Evaluation is a brief statement about your assessment that the patient is improving, declining, or no change.  This information will be displayed automatically on your shift  note.  Outcome: Progressing  Flowsheets (Taken 7/19/2025 0612)  Outcome Evaluation: Nausea and dry-heaving continues. Small sips of clears.  Plan of Care Reviewed With: patient  Overall Patient Progress: no change  Goal: Patient-Specific Goal (Individualized)  Description: You can add care plan individualizations to a care plan. Examples of Individualization might be:  \"Parent requests to be called daily at 9am for status\", \"I have a hard time hearing out of my right ear\", or \"Do not touch me to wake me up as it startles  me\".  Outcome: Progressing  Goal: Absence of Hospital-Acquired Illness or Injury  Outcome: Progressing  Intervention: Identify and Manage Fall Risk  Recent Flowsheet Documentation  Taken 7/19/2025 0012 by Peggy Nicole RN  Safety Promotion/Fall Prevention:   clutter free " environment maintained   nonskid shoes/slippers when out of bed   room near nurse's station   safety round/check completed  Intervention: Prevent Skin Injury  Recent Flowsheet Documentation  Taken 7/19/2025 0012 by Peggy Nicole RN  Body Position: position changed independently  Goal: Optimal Comfort and Wellbeing  Outcome: Progressing  Intervention: Monitor Pain and Promote Comfort  Recent Flowsheet Documentation  Taken 7/18/2025 2336 by Peggy Nicole RN  Pain Management Interventions: medication (see MAR)  Goal: Readiness for Transition of Care  Outcome: Progressing     Problem: Nausea and Vomiting  Goal: Nausea and Vomiting Relief  Outcome: Progressing  Intervention: Prevent and Manage Nausea and Vomiting  Recent Flowsheet Documentation  Taken 7/19/2025 0012 by Peggy Nicole RN  Nausea/Vomiting Interventions:   antiemetic   stimuli minimized

## 2025-07-19 NOTE — PLAN OF CARE
"Assumed care for pt from [id number]. Continues to have nausea and abdominal pain with little to no relief with antiemetics and pain meds. Attempted oral oxycodone with minimum relief. RN witness 1 episode of emesis of 150 ml about 1 hour after PRN oxy given. IVF NS&K+ continues, reduced rate to 75 ml/hr due to IV irritation. Potassium 3.3 redraw in the AM. Pt denies BM, continues to be on clear liquid diet. Tolerating ice chips, and water. Pt sleeps between cares and making needs known with call light.     Problem: Adult Inpatient Plan of Care  Goal: Plan of Care Review  Description: The Plan of Care Review/Shift note should be completed every shift.  The Outcome Evaluation is a brief statement about your assessment that the patient is improving, declining, or no change.  This information will be displayed automatically on your shift  note.  7/18/2025 2304 by Velvet Amaya, RN  Outcome: Progressing  7/18/2025 2303 by Velvet Amaya RN  Outcome: Progressing  Goal: Patient-Specific Goal (Individualized)  Description: You can add care plan individualizations to a care plan. Examples of Individualization might be:  \"Parent requests to be called daily at 9am for status\", \"I have a hard time hearing out of my right ear\", or \"Do not touch me to wake me up as it startles  me\".  7/18/2025 2304 by Velvet Amaya, RN  Outcome: Progressing  7/18/2025 2303 by Velvet Amaya, RN  Outcome: Progressing  Goal: Absence of Hospital-Acquired Illness or Injury  7/18/2025 2304 by Velvet Amaya, RN  Outcome: Progressing  7/18/2025 2303 by Velevt Amaya, RN  Outcome: Progressing  Intervention: Identify and Manage Fall Risk  Recent Flowsheet Documentation  Taken 7/18/2025 2003 by Velvet Amaya, RN  Safety Promotion/Fall Prevention: safety round/check completed  Intervention: Prevent Skin Injury  Recent Flowsheet Documentation  Taken 7/18/2025 2003 by Velvet Amaya, RN  Body Position: position changed " independently  Goal: Optimal Comfort and Wellbeing  7/18/2025 2304 by Velvet Amaya RN  Outcome: Progressing  7/18/2025 2303 by Velvet Amaya RN  Outcome: Progressing  Goal: Readiness for Transition of Care  7/18/2025 2304 by Velvet Amaya RN  Outcome: Progressing  7/18/2025 2303 by Velvet Amaya RN  Outcome: Progressing  Intervention: Mutually Develop Transition Plan  Recent Flowsheet Documentation  Taken 7/18/2025 2100 by Velvet Amaya RN  Patient/Family Anticipates Transition to: home  Equipment Currently Used at Home: none     Problem: Nausea and Vomiting  Goal: Nausea and Vomiting Relief  7/18/2025 2304 by Velvet Amaya RN  Outcome: Progressing  7/18/2025 2303 by Velvet Amaya RN  Outcome: Progressing  Intervention: Prevent and Manage Nausea and Vomiting  Recent Flowsheet Documentation  Taken 7/18/2025 2003 by Velvet Amaya, RN  Fluid/Electrolyte Management: electrolyte supplement initiated  Nausea/Vomiting Interventions:   antiemetic   nausea triggers minimized   sips of clear liquids given

## 2025-07-20 VITALS
RESPIRATION RATE: 16 BRPM | SYSTOLIC BLOOD PRESSURE: 129 MMHG | OXYGEN SATURATION: 98 % | BODY MASS INDEX: 16.42 KG/M2 | WEIGHT: 110.9 LBS | HEART RATE: 68 BPM | DIASTOLIC BLOOD PRESSURE: 81 MMHG | TEMPERATURE: 99 F | HEIGHT: 69 IN

## 2025-07-20 PROCEDURE — 250N000011 HC RX IP 250 OP 636: Performed by: NURSE PRACTITIONER

## 2025-07-20 PROCEDURE — 99239 HOSP IP/OBS DSCHRG MGMT >30: CPT | Performed by: NURSE PRACTITIONER

## 2025-07-20 PROCEDURE — 250N000013 HC RX MED GY IP 250 OP 250 PS 637: Performed by: NURSE PRACTITIONER

## 2025-07-20 PROCEDURE — 258N000003 HC RX IP 258 OP 636: Performed by: NURSE PRACTITIONER

## 2025-07-20 PROCEDURE — 250N000013 HC RX MED GY IP 250 OP 250 PS 637: Performed by: INTERNAL MEDICINE

## 2025-07-20 RX ORDER — KETOROLAC TROMETHAMINE 10 MG/1
10 TABLET, FILM COATED ORAL EVERY 6 HOURS PRN
Qty: 20 TABLET | Refills: 0 | Status: SHIPPED | OUTPATIENT
Start: 2025-07-20

## 2025-07-20 RX ORDER — HALOPERIDOL 5 MG/ML
2 INJECTION INTRAMUSCULAR EVERY 6 HOURS PRN
Status: DISCONTINUED | OUTPATIENT
Start: 2025-07-20 | End: 2025-07-20 | Stop reason: HOSPADM

## 2025-07-20 RX ORDER — ALBUTEROL SULFATE 0.83 MG/ML
2.5 SOLUTION RESPIRATORY (INHALATION)
Status: DISCONTINUED | OUTPATIENT
Start: 2025-07-20 | End: 2025-07-20 | Stop reason: HOSPADM

## 2025-07-20 RX ORDER — CAPSAICIN 0.07 G/100G
CREAM TOPICAL 3 TIMES DAILY PRN
Status: DISCONTINUED | OUTPATIENT
Start: 2025-07-20 | End: 2025-07-20 | Stop reason: HOSPADM

## 2025-07-20 RX ORDER — MIRTAZAPINE 7.5 MG/1
15 TABLET, FILM COATED ORAL AT BEDTIME
Status: DISCONTINUED | OUTPATIENT
Start: 2025-07-20 | End: 2025-07-20 | Stop reason: HOSPADM

## 2025-07-20 RX ORDER — HALOPERIDOL 5 MG/ML
2 INJECTION INTRAMUSCULAR EVERY 6 HOURS PRN
Status: DISCONTINUED | OUTPATIENT
Start: 2025-07-20 | End: 2025-07-20

## 2025-07-20 RX ORDER — FERROUS SULFATE 325(65) MG
325 TABLET ORAL
Qty: 30 TABLET | Refills: 3 | Status: SHIPPED | OUTPATIENT
Start: 2025-07-20 | End: 2025-07-20

## 2025-07-20 RX ORDER — MIRTAZAPINE 15 MG/1
15 TABLET, FILM COATED ORAL AT BEDTIME
Qty: 30 TABLET | Refills: 1 | Status: SHIPPED | OUTPATIENT
Start: 2025-07-20

## 2025-07-20 RX ORDER — MAGNESIUM HYDROXIDE/ALUMINUM HYDROXICE/SIMETHICONE 120; 1200; 1200 MG/30ML; MG/30ML; MG/30ML
15 SUSPENSION ORAL 3 TIMES DAILY PRN
Status: DISCONTINUED | OUTPATIENT
Start: 2025-07-20 | End: 2025-07-20 | Stop reason: HOSPADM

## 2025-07-20 RX ORDER — FERROUS SULFATE 325(65) MG
325 TABLET ORAL
Qty: 30 TABLET | Refills: 3 | Status: SHIPPED | OUTPATIENT
Start: 2025-07-20

## 2025-07-20 RX ORDER — DIPHENHYDRAMINE HYDROCHLORIDE 50 MG/ML
50 INJECTION, SOLUTION INTRAMUSCULAR; INTRAVENOUS
Status: DISCONTINUED | OUTPATIENT
Start: 2025-07-20 | End: 2025-07-20 | Stop reason: HOSPADM

## 2025-07-20 RX ORDER — DIPHENHYDRAMINE HYDROCHLORIDE 50 MG/ML
25 INJECTION, SOLUTION INTRAMUSCULAR; INTRAVENOUS
Status: DISCONTINUED | OUTPATIENT
Start: 2025-07-20 | End: 2025-07-20 | Stop reason: HOSPADM

## 2025-07-20 RX ORDER — DEXAMETHASONE SODIUM PHOSPHATE 10 MG/ML
4 INJECTION, SOLUTION INTRAMUSCULAR; INTRAVENOUS ONCE
Status: COMPLETED | OUTPATIENT
Start: 2025-07-20 | End: 2025-07-20

## 2025-07-20 RX ORDER — MEPERIDINE HYDROCHLORIDE 25 MG/ML
25 INJECTION INTRAMUSCULAR; INTRAVENOUS; SUBCUTANEOUS
Refills: 0 | Status: DISCONTINUED | OUTPATIENT
Start: 2025-07-20 | End: 2025-07-20 | Stop reason: HOSPADM

## 2025-07-20 RX ORDER — ALBUTEROL SULFATE 90 UG/1
1-2 INHALANT RESPIRATORY (INHALATION)
Status: DISCONTINUED | OUTPATIENT
Start: 2025-07-20 | End: 2025-07-20 | Stop reason: HOSPADM

## 2025-07-20 RX ORDER — METHYLPREDNISOLONE SODIUM SUCCINATE 40 MG/ML
40 INJECTION INTRAMUSCULAR; INTRAVENOUS
Status: DISCONTINUED | OUTPATIENT
Start: 2025-07-20 | End: 2025-07-20 | Stop reason: HOSPADM

## 2025-07-20 RX ADMIN — OXYCODONE HYDROCHLORIDE 2.5 MG: 5 TABLET ORAL at 02:17

## 2025-07-20 RX ADMIN — DULOXETINE 20 MG: 20 CAPSULE, DELAYED RELEASE ORAL at 10:04

## 2025-07-20 RX ADMIN — SODIUM CHLORIDE AND POTASSIUM CHLORIDE: .9; .15 SOLUTION INTRAVENOUS at 11:24

## 2025-07-20 RX ADMIN — FAMOTIDINE 10 MG: 10 TABLET ORAL at 00:03

## 2025-07-20 RX ADMIN — ACETAMINOPHEN 975 MG: 325 TABLET ORAL at 06:42

## 2025-07-20 RX ADMIN — PANTOPRAZOLE SODIUM 40 MG: 40 TABLET, DELAYED RELEASE ORAL at 06:42

## 2025-07-20 RX ADMIN — DEXAMETHASONE SODIUM PHOSPHATE 4 MG: 10 INJECTION, SOLUTION INTRAMUSCULAR; INTRAVENOUS at 10:48

## 2025-07-20 RX ADMIN — IRON SUCROSE 200 MG: 20 INJECTION, SOLUTION INTRAVENOUS at 11:24

## 2025-07-20 ASSESSMENT — ACTIVITIES OF DAILY LIVING (ADL)
ADLS_ACUITY_SCORE: 26

## 2025-07-20 NOTE — PSYCH
"When we feel anxious, distressed, or panicky, it can be helpful to practice grounding exercises. Grounding exercises are activities that can help to calm us down and bring us back to the present. There are many different grounding exercises available; just a few are listed below. Practice some and see which ones you like; you can always put your own spin on an activity as well.     The Box Breathing Method   When we become distressed, our breathing speeds up and our hearts start pumping faster to prepare our bodies to run or fight. This was a response that evolved to help us run away from physical dangers - like a bear charging at us while we are trying to hunt - thousands of years ago. We (for the most part) no longer face these kinds of danger, but our bodies don't know this, so we have retained this physical response to other types of stress. Since our modern-day types of stress rarely have a clear end point like a bear attack might, our bodies have a hard time knowing when they can stop this physical response. Luckily, our bodies have a type of \"off switch\" for stress- the parasympathetic nervous system- that we can take advantage of. By controlling our breathing, we can communicate to our bodies that we are not in danger and can turn off the alarm system. It may take a moment, but if you practice the exercise below, you will be able to calm your body down.     Close your eyes. Breathe in through your nose while counting to four slowly. Try to take deep breaths that go to your belly- rather than your chest.   Hold your breath inside while counting slowly to four. Try not to clamp your mouth or nose shut.   Begin to slowly exhale for 4 seconds. Purse your lips like you are blowing up a balloon.   Repeat steps 1 through 3 as many times as you need to.     These techniques use your five senses to help you move through distress:   Put your hands in cold water or hold a piece of ice.    or touch items near " "you.   Breathe deeply.   Savor a food or drink. Sour or tangy foods/drinks work best as they grab at your senses to pull you out of the thought on which you are ruminating.   Take a short walk.   Savor a scent.   Move your body.     5,4,3,2,1 Grounding Exercise   The  5,4,3,2,1  exercise is a common sensory awareness grounding exercise that many find helpful to relax or get through difficult moments.     Describe 5 things you can see in the room.   Name 4 things you can feel ( my feet on the floor  or  the air in my nose ).   Name 3 things you can hear right now ( traffic outside ).   Name 2 things you can smell right now (if you can't smell anything right now, you can get up and find scents (describe these to yourself)   Name 1 thing you can taste (again- if you can't taste anything right now, you can go get a snack or a drink and describe this flavor to yourself).     The Butterfly Tapping Method   Cross your arms and put each hand on the opposite shoulder or link your thumbs facing you and put your hands on your chest. Begin tapping your hands against your shoulders/chest at a pace you find calming. Keep doing this for as long as you need to and practice saying affirmations that you find beneficial. For example, you can repeat \"things have been okay before and they will be okay again.\"         Stress Management   Stress is your body's fight-or-flight response. You may feel energy surge through your body if you are in an emergency, or if you are worried or anxious about something. Stress can be found at home, work, school, or in traffic. A situation you find threatening may trigger stress.     What Stress Can Do:   Stress can give you health problems or make a current problem worse. It can increase your breathing, heart rate and blood pressure. Feelings of anger may turn into long-lasting (chronic) irritation and feelings of fear may become anxiety. Long term stress can interfere with your daily life and cause: " illness (weakened immunity), depression, anxiety disorders, ulcers, high blood pressure, phobias, disturbed sleep patterns, and tension headaches.   Stress affects everyone, but reactions to stress vary from person to person. You cannot make stress go away, but you can learn to manage it.     Stress Management  Stress is your body's fight-or-flight response. Here are some tips to manage stress:  Maintain Good Health Habits:  Eat healthful foods and avoid caffeine, alcohol, and nicotine.    Be Physically Active:  Engage in moderate exercise to relieve tension.    Get Plenty of Rest:  Ensure you get enough sleep each night.    Structure Daily Activities:  Plan your activities and include personal time for enjoyment.    Set Realistic Goals:  Ask for help if needed.    Focus on What You Can Control:  Let go of things you cannot change.    Identify Stressors:  Avoid situations that cause stress if possible.    Talk About Stress:  Discuss your stress with a close friend, spouse, or healthcare provider.    Relaxation Practice:  Find a comfortable position on the floor with pillows for support.  Flex and release each muscle group, starting from your forehead down to your toes.  Breathe deeply in a rhythm and rest for a few minutes.     Other Resources:     Websites     www.[employer]   www.mindful.org   Francia.org  Griefshare.org      Apps     Thakur (self-care josué, free)  Insight Timer (free meditation josué)  The Tapping Solution (free with in-josué purchases)  Ten Percent Happier (free josué)  MindShift (free CBT-based tools for anxiety)  PTSD  (free josué for PTSD management)  BellyBio (free josué for deep breathing exercises)  QuitNow! (free josué for quitting smoking)  Take a Break! (free josué for stress relief)  What's Up? (free CBT and ACT tools)  Shine (free josué for People of Color)  Relax with Ruben Pastrana (free josué for better sleep)  Bearable (free overall symptom tracker)  eMoods Classic (free josué for bipolar  disorder)

## 2025-07-20 NOTE — PLAN OF CARE
"Goal Outcome Evaluation:      Plan of Care Reviewed With: patient    Overall Patient Progress: improvingOverall Patient Progress: improving     BP (!) 145/94 (BP Location: Right arm)   Pulse 57   Temp 98  F (36.7  C) (Oral)   Resp 16   Ht 1.753 m (5' 9\")   Wt 50.3 kg (110 lb 14.4 oz)   LMP 05/06/2025 (Approximate)   SpO2 99%   BMI 16.38 kg/m      Pt A & O x 4, able to make needs known, call light with in reach for needs, still reports nausea and pain, offered PRN med's but pt declined, tolerating oral intake and switched to regular diet, new IV placed and restarted IV fluid, Iron was also given, plan id to discharge when medically ready.   Problem: Adult Inpatient Plan of Care  Goal: Plan of Care Review  Description: The Plan of Care Review/Shift note should be completed every shift.  The Outcome Evaluation is a brief statement about your assessment that the patient is improving, declining, or no change.  This information will be displayed automatically on your shift  note.  Outcome: Progressing  Flowsheets (Taken 7/20/2025 1437)  Plan of Care Reviewed With: patient  Overall Patient Progress: improving  Goal: Patient-Specific Goal (Individualized)  Description: You can add care plan individualizations to a care plan. Examples of Individualization might be:  \"Parent requests to be called daily at 9am for status\", \"I have a hard time hearing out of my right ear\", or \"Do not touch me to wake me up as it startles  me\".  Outcome: Progressing  Goal: Absence of Hospital-Acquired Illness or Injury  Outcome: Progressing  Intervention: Identify and Manage Fall Risk  Recent Flowsheet Documentation  Taken 7/20/2025 0900 by Yennifer Hogan, RN  Safety Promotion/Fall Prevention:   clutter free environment maintained   nonskid shoes/slippers when out of bed   room near nurse's station   safety round/check completed  Intervention: Prevent Skin Injury  Recent Flowsheet Documentation  Taken 7/20/2025 0900 by Samira, " Yennifer SORENSEN RN  Body Position: position changed independently  Intervention: Prevent and Manage VTE (Venous Thromboembolism) Risk  Recent Flowsheet Documentation  Taken 7/20/2025 0900 by Yennifer Hogan RN  VTE Prevention/Management: SCDs off (sequential compression devices)  Goal: Optimal Comfort and Wellbeing  Outcome: Progressing  Goal: Readiness for Transition of Care  Outcome: Progressing     Problem: Nausea and Vomiting  Goal: Nausea and Vomiting Relief  Outcome: Progressing

## 2025-07-20 NOTE — DISCHARGE INSTRUCTIONS
"When we feel anxious, distressed, or panicky, it can be helpful to practice grounding exercises. Grounding exercises are activities that can help to calm us down and bring us back to the present. There are many different grounding exercises available; just a few are listed below. Practice some and see which ones you like; you can always put your own spin on an activity as well.     The Box Breathing Method   When we become distressed, our breathing speeds up and our hearts start pumping faster to prepare our bodies to run or fight. This was a response that evolved to help us run away from physical dangers - like a bear charging at us while we are trying to hunt - thousands of years ago. We (for the most part) no longer face these kinds of danger, but our bodies don't know this, so we have retained this physical response to other types of stress. Since our modern-day types of stress rarely have a clear end point like a bear attack might, our bodies have a hard time knowing when they can stop this physical response. Luckily, our bodies have a type of \"off switch\" for stress- the parasympathetic nervous system- that we can take advantage of. By controlling our breathing, we can communicate to our bodies that we are not in danger and can turn off the alarm system. It may take a moment, but if you practice the exercise below, you will be able to calm your body down.     Close your eyes. Breathe in through your nose while counting to four slowly. Try to take deep breaths that go to your belly- rather than your chest.   Hold your breath inside while counting slowly to four. Try not to clamp your mouth or nose shut.   Begin to slowly exhale for 4 seconds. Purse your lips like you are blowing up a balloon.   Repeat steps 1 through 3 as many times as you need to.     These techniques use your five senses to help you move through distress:   Put your hands in cold water or hold a piece of ice.    or touch items near " "you.   Breathe deeply.   Savor a food or drink. Sour or tangy foods/drinks work best as they grab at your senses to pull you out of the thought on which you are ruminating.   Take a short walk.   Savor a scent.   Move your body.     5,4,3,2,1 Grounding Exercise   The  5,4,3,2,1  exercise is a common sensory awareness grounding exercise that many find helpful to relax or get through difficult moments.     Describe 5 things you can see in the room.   Name 4 things you can feel ( my feet on the floor  or  the air in my nose ).   Name 3 things you can hear right now ( traffic outside ).   Name 2 things you can smell right now (if you can't smell anything right now, you can get up and find scents (describe these to yourself)   Name 1 thing you can taste (again- if you can't taste anything right now, you can go get a snack or a drink and describe this flavor to yourself).     The Butterfly Tapping Method   Cross your arms and put each hand on the opposite shoulder or link your thumbs facing you and put your hands on your chest. Begin tapping your hands against your shoulders/chest at a pace you find calming. Keep doing this for as long as you need to and practice saying affirmations that you find beneficial. For example, you can repeat \"things have been okay before and they will be okay again.\"         Stress Management   Stress is your body's fight-or-flight response. You may feel energy surge through your body if you are in an emergency, or if you are worried or anxious about something. Stress can be found at home, work, school, or in traffic. A situation you find threatening may trigger stress.     What Stress Can Do:   Stress can give you health problems or make a current problem worse. It can increase your breathing, heart rate and blood pressure. Feelings of anger may turn into long-lasting (chronic) irritation and feelings of fear may become anxiety. Long term stress can interfere with your daily life and cause: " illness (weakened immunity), depression, anxiety disorders, ulcers, high blood pressure, phobias, disturbed sleep patterns, and tension headaches.   Stress affects everyone, but reactions to stress vary from person to person. You cannot make stress go away, but you can learn to manage it.     Stress Management  Stress is your body's fight-or-flight response. Here are some tips to manage stress:  Maintain Good Health Habits:  Eat healthful foods and avoid caffeine, alcohol, and nicotine.    Be Physically Active:  Engage in moderate exercise to relieve tension.    Get Plenty of Rest:  Ensure you get enough sleep each night.    Structure Daily Activities:  Plan your activities and include personal time for enjoyment.    Set Realistic Goals:  Ask for help if needed.    Focus on What You Can Control:  Let go of things you cannot change.    Identify Stressors:  Avoid situations that cause stress if possible.    Talk About Stress:  Discuss your stress with a close friend, spouse, or healthcare provider.    Relaxation Practice:  Find a comfortable position on the floor with pillows for support.  Flex and release each muscle group, starting from your forehead down to your toes.  Breathe deeply in a rhythm and rest for a few minutes.     Other Resources:     Websites     www.[employer]   www.mindful.org   Francia.org  Griefshare.org      Apps     Thakur (self-care josué, free)  Insight Timer (free meditation josué)  The Tapping Solution (free with in-josué purchases)  Ten Percent Happier (free josué)  MindShift (free CBT-based tools for anxiety)  PTSD  (free josué for PTSD management)  BellyBio (free josué for deep breathing exercises)  QuitNow! (free josué for quitting smoking)  Take a Break! (free josué for stress relief)  What's Up? (free CBT and ACT tools)  Shine (free josué for People of Color)  Relax with Ruben Pastrana (free josué for better sleep)  Bearable (free overall symptom tracker)  eMoods Classic (free josué for bipolar  disorder)

## 2025-07-20 NOTE — PLAN OF CARE
"Vitals are Temp: 98.6  F (37  C) Temp src: Oral BP: 138/83 Pulse: 89   Resp: 16 SpO2: 97 %.  Patient is Alert and Oriented x4. They are independent with no assistive devices .  Pt is a Clear liquid diet.  They are complaining of 9/10 pain in their abdomen.  Oxycodone given for pain.  Patient has no IV access. Denies dizziness/SOB. C/o nausea. PRN Zofran administered. Emesis x1 after administration of PO oxycodone, patient stated emesis was green and paste like. C/o heartburn, PRN Pepcid administered.Taking small sips of clears and ice chips. Scop patch in place behind L ear. OB/Gyn following. Plan is to ADAT, manage symptoms and discharge home when medically ready.     Goal Outcome Evaluation:      Plan of Care Reviewed With: patient    Overall Patient Progress: no change    Outcome Evaluation: Abdominal pain and nausea continue. Emesis x1 after PO oxycodone. Tolerating very little PO intake      Problem: Adult Inpatient Plan of Care  Goal: Plan of Care Review  Description: The Plan of Care Review/Shift note should be completed every shift.  The Outcome Evaluation is a brief statement about your assessment that the patient is improving, declining, or no change.  This information will be displayed automatically on your shift  note.  Outcome: Not Progressing  Flowsheets (Taken 7/20/2025 0545)  Outcome Evaluation: Abdominal pain and nausea continue. Emesis x1 after PO oxycodone. Tolerating very little PO intake  Plan of Care Reviewed With: patient  Overall Patient Progress: no change  Goal: Patient-Specific Goal (Individualized)  Description: You can add care plan individualizations to a care plan. Examples of Individualization might be:  \"Parent requests to be called daily at 9am for status\", \"I have a hard time hearing out of my right ear\", or \"Do not touch me to wake me up as it startles  me\".  Outcome: Not Progressing  Goal: Absence of Hospital-Acquired Illness or Injury  Outcome: Not Progressing  Intervention: " Identify and Manage Fall Risk  Recent Flowsheet Documentation  Taken 7/20/2025 0003 by Peggy Nicole RN  Safety Promotion/Fall Prevention:   clutter free environment maintained   nonskid shoes/slippers when out of bed   room near nurse's station   safety round/check completed  Intervention: Prevent Skin Injury  Recent Flowsheet Documentation  Taken 7/20/2025 0003 by Peggy Nicole RN  Body Position: position changed independently  Intervention: Prevent and Manage VTE (Venous Thromboembolism) Risk  Recent Flowsheet Documentation  Taken 7/20/2025 0003 by Peggy Nicole RN  VTE Prevention/Management: SCDs off (sequential compression devices)  Goal: Optimal Comfort and Wellbeing  Outcome: Not Progressing  Intervention: Monitor Pain and Promote Comfort  Recent Flowsheet Documentation  Taken 7/20/2025 0003 by Peggy Nicole RN  Pain Management Interventions: declines  Goal: Readiness for Transition of Care  Outcome: Not Progressing     Problem: Nausea and Vomiting  Goal: Nausea and Vomiting Relief  Outcome: Not Progressing  Intervention: Prevent and Manage Nausea and Vomiting  Recent Flowsheet Documentation  Taken 7/20/2025 0003 by Peggy Nicole RN  Nausea/Vomiting Interventions: antiemetic

## 2025-07-20 NOTE — DISCHARGE SUMMARY
[facility]  Hospitalist Discharge Summary      Date of Admission:  7/17/2025  Date of Discharge:  7/20/2025  Discharging Provider: UZIEL Mars CNP  Discharge Service: Hospitalist Service    Discharge Diagnoses   See below    Clinically Significant Risk Factors          Follow-ups Needed After Discharge   Follow-up Appointments       Follow Up      Follow up with Dr. Villafana.  [facility]: [phone].  Call to schedule follow up.  Should be seen in the next 1-2 weeks.  Was seen in the hospital.        Hospital to Primary Care - Establish PCP Referral      Please be aware that coverage of these services is subject to the terms and limitations of your health insurance plan.  Call member services at your health plan with any benefit or coverage questions.    Schedule Primary Care visit within: 7 Days               Unresulted Labs Ordered in the Past 30 Days of this Admission       No orders found from 6/17/2025 to 7/18/2025.        These results will be followed up by NA    Discharge Disposition   Discharged to home  Condition at discharge: Stable    Hospital Course   Hector Mazariegos is a 19 year old female admitted on 7/17/2025. She has a PMH significant for dysmenorrhea and menorrhagia along [facility] past admissions for cannabis related hyperemesis.  She comes in with increasing menstrual cramps that started this past Tues. Since then, she's had heavy bleeding and started to have intractable n/v. Her sisters said that it's typical to have strong cramps but never this severe of n/v. Also she has not had THC or cannabis for at lest 2 weeks so she was brought to ED for further eval. She has had multiple CT abd/pelvis studies, as well as a recent EGD. She continues to experience frequent bouts of these symptoms and attributes them to her menstrual period.       Workup in the ED reveals AGMA and hypokalemia. HCG negative. Baseline hgb in the 10's. She was started on IVF  and anti-emetics and recommended for admission.     #Intractable n/v, possible cannabinoid hyperemesis syndrome  #Dysmenorrhea   #Right hemorrhagic ovarian cyst. Hx of painful mentral cramps with associated n/v but this seems to have last longer than previous. She was actually seen at the [place] [address] ED for similar complaints earlier in the week and had unremarkable work up.  She had CT abd/pelvis and EGD in Feb 2025 that was reported as negative and a negative H.Pylori testing.  She was admitted to the Observation unit. She received supportive care including IV fluids, antiemetics, and PRN ativan.  Her pain was treated with Toradol and Oxycodone.   At time of discharge, she was transitioned from Trazodone to Remeron as the antiemetic effect is thought to be due to its antagonism of 5-HT3 and H1 receptors. Remeron can also help with significant underlying anxiety. She was encouraged to cease using THC products.  In terms of the right hemorrhagic ovarian cyst, she was seen by OB/GYN, Dr. Buitrago. She will follow up as outpatient with repeat ultrasound in 6-8 weeks.  Ongoing conversations regarding possible use of oral contraceptions to help with dysmenorrhea.  Outpatient pain management -- APAP 650 mg every 4 hours as needed.  Toradol 10 mg every 6 hours as needed for moderate pain.     #Anxiety  #Insomnia  #THC use: PRN lorazepam.  Stop Cymbalta and start Remeron 15 mg at bedtime. Stop Trazodone. Symptomatic management - hot showers.  Resources regarding anxiety manage were provided.  Follow up with PCP.      #Microcytic anemia:  Iron stores 20 and sat index 5 with normal TIBC.  Hgb 9.7 Likely due to heavy menstruation. She tolerate IV iron -- Fe sucrose 200 mg IVPB on the day of discharge.  Start PO iron at home.  Discussed cause cause nausea and constipation.  Follow up with PCP or OB/GYN as outpatient.     #Hx of provoked PE following breast reduction surgery '23:  noted. Treated with DOAC. Not on treatment and  denies recurrence.   Will defer starting oral contraceptives to either PCP or OB/GYN.  Considering nexplanon.     See AVS.      Consultations This Hospital Stay   GYNECOLOGY IP CONSULT  CONSULT FOR INPATIENT VASCULAR ACCESS CARE    Code Status   Full Code    Time Spent on this Encounter   I, UZIEL Mars CNP, personally saw the patient today and spent greater than 30 minutes discharging this patient.       UZIEL Mars CNP  [facility] OBSERVATION DEPT  [address] [facility] [address]  Phone: [phone]  ______________________________________________________________________    Physical Exam   Vital Signs: Temp: 99  F (37.2  C) Temp src: Oral BP: 129/81 Pulse: 68   Resp: 16 SpO2: 98 % O2 Device: None (Room air)    Weight: 110 lbs 14.4 oz  See daily progress note.       Primary Care Physician   Physician No Ref-Primary    Discharge Orders      Hospital to Primary Care - Establish PCP Referral      Reason for your hospital stay    Cannabinoid hyperemesis syndrome  Anxiety   Hemorrhagic ovarian cyst  Chronic anemia -- iron deficiency  History of provoked pulmonary embolism  Obesity    Consult:  OB/GYN -- Dr. Wendy Villafana, DO -- [facility] [phone]    Medication changes:   -- Stop marijuana.   -- Stop trazodone.  Trial mirtazapine (Remeron)  -- Ativan for nausea and anxiety  -- Can use Reglan to help with nausea.    Procedures:  7/20/2025 IV iron infusion.  Resources: See hand out regarding non medicated ways of managing anxiety.     Activity    Your activity upon discharge: activity as tolerated and no driving while on analgesics.     Follow Up    Follow up with Dr. Villafana.  [facility]: [phone].  Call to schedule follow up.  Should be seen in the next 1-2 weeks.  Was seen in the hospital.     Discharge Instructions    1. Condition Overview: Cannabinoid Hyperemesis Syndrome (CHS) is a condition characterized by  recurrent episodes of severe nausea, vomiting, and abdominal pain associated with chronic cannabis use. Symptoms often improve with cessation of cannabis use.    2. Medication Instructions:    Antiemetics: Take prescribed antiemetic medications as directed to help control nausea and vomiting.  Hydration: Ensure adequate fluid intake to prevent dehydration. Oral rehydration solutions or electrolyte drinks can be beneficial.  Other Medications: Follow any additional medication instructions provided by your healthcare provider.  3. Lifestyle and Dietary Recommendations:    Cannabis Cessation: It is crucial to stop using cannabis completely. Continued use can lead to recurrent symptoms.  Diet: Eat small, frequent meals. Avoid spicy, fatty, or greasy foods that may exacerbate symptoms.  Hydration: Drink plenty of fluids, especially if experiencing vomiting. Aim for clear liquids initially, then gradually reintroduce solid foods as tolerated.  4. Symptom Management:    Hot Showers/Baths: Some patients find relief from symptoms with hot showers or baths. Use caution to avoid burns.  Rest: Ensure adequate rest and avoid stressful situations that may worsen symptoms.  5. Follow-Up Care:    Appointments: See above.     Monitoring: Keep track of your symptoms and any triggers. Report any worsening or new symptoms to your healthcare provider.    6. When to Seek Immediate Medical Attention:    Persistent vomiting leading to dehydration  Severe abdominal pain  Signs of dehydration, such as dizziness, dry mouth, or reduced urination  Any other concerning symptoms    7. Support Resources:    Consider seeking support for cannabis cessation, such as counseling or support groups, to help manage withdrawal and prevent recurrence of CHS.     Diet    Follow this diet upon discharge: Current Diet:Orders Placed This Encounter      Regular Diet Adult    Please make sure that you are staying hydrated.  The goal is to drink a minimum of 60-90  ounces of non carbonated, non caffeinated beverages daily.       Significant Results and Procedures   Most Recent 3 CBC's:  Recent Labs   Lab Test 07/19/25  0543 07/18/25  0626 07/17/25  1545   WBC 6.9 6.7 7.3   HGB 10.2* 9.7* 10.7*   MCV 72* 72* 72*    428 509*     Most Recent 3 BMP's:  Recent Labs   Lab Test 07/19/25  0543 07/18/25  0626 07/17/25  1545    140 142   POTASSIUM 3.5 3.3* 3.3*   CHLORIDE 103 108* 106   CO2 19* 21* 20*   BUN 6.1 9.1 14.4   CR 0.54 0.58 0.58   ANIONGAP 13 11 16*   DANIELLA 8.2* 7.9* 8.8   GLC 74 90 91     Most Recent 2 LFT's:  Recent Labs   Lab Test 06/09/25  1343 06/07/25  1116   AST 22 20   ALT 15 20   ALKPHOS 85 81   BILITOTAL 0.3 0.8       Most Recent 6 glucoses:  Recent Labs   Lab Test 07/19/25  0543 07/18/25  0626 07/17/25  1545 06/09/25  1343 06/08/25  0527 06/07/25  1116   GLC 74 90 91 91 86 79       Results for orders placed or performed during the hospital encounter of 07/17/25   US Pelvic Complete with Transvaginal    Narrative    EXAM: US PELVIC TRANSABDOMINAL AND TRANSVAGINAL  LOCATION: [facility]  DATE: 7/17/2025    INDICATION: lower abd pain  COMPARISON: None.  TECHNIQUE: Transabdominal scans were performed. Endovaginal examination deferred.    FINDINGS:    UTERUS: 7.3 x 5.0 x 2.9 cm. Normal in size and position.    ENDOMETRIUM: Not well visualized    RIGHT OVARY: 3.7 x 2.9 x 2.5 cm. 2.4 x 2.5 x 1.9 cm complex, potentially hemorrhagic cyst. Color flow documented.    LEFT OVARY: Nonvisualized    No significant free fluid.      Impression    IMPRESSION:  1.  Limited exam.  2.  2.5 cm probable right ovarian hemorrhagic cyst.   US Pelvis Cmpl wo Transvaginal w Abd/Pel Duplex Lmt    Narrative    EXAM: US PELVIS CMPL WO TRANSVAGINAL W ABD/PEL DUPLEX LIMITED  LOCATION: [facility]  DATE: 7/19/2025    INDICATION: acute right pelvic pain, r o torsion vs rupture  COMPARISON: 7/17/2025  TECHNIQUE: Transabdominal scans were  performed. Color flow with spectral Doppler and waveform analysis performed.    FINDINGS:    UTERUS: 5.8 x 3.6 x 2.4 cm. Normal in size and position with no masses.    ENDOMETRIUM: 2 mm. Normal smooth endometrium.    RIGHT OVARY: 2.8 x 2.2 x 2.0 cm. Normal with arterial and venous duplex flow identified.    LEFT OVARY: Not visualized, unknown reason.    No significant free fluid.      Impression    IMPRESSION:    1.  Normal uterus and right ovary.  2.  Left ovary not visualized.             Discharge Medications      Review of your medicines        START taking        Dose / Directions   acetaminophen 325 MG tablet  Commonly known as: TYLENOL  Used for: Hemorrhagic ovarian cyst      Dose: 650 mg  Take 2 tablets (650 mg) by mouth every 4 hours as needed for mild pain or other (and adjunct with moderate or severe pain or per patient request).  Quantity: 100 tablet  Refills: 1     ferrous sulfate 325 (65 Fe) MG tablet  Commonly known as: FEROSUL  Used for: Other iron deficiency anemia      Dose: 325 mg  Take 1 tablet (325 mg) by mouth daily (with breakfast).  Quantity: 30 tablet  Refills: 3     ketorolac 10 MG tablet  Commonly known as: TORADOL  Used for: Hemorrhagic ovarian cyst      Dose: 10 mg  Take 1 tablet (10 mg) by mouth every 6 hours as needed for moderate pain.  Quantity: 20 tablet  Refills: 0     LORazepam 0.5 MG tablet  Commonly known as: ATIVAN  Used for: Intractable nausea and vomiting, Anxiety      Dose: 0.5 mg  Take 1 tablet (0.5 mg) by mouth every 4 hours as needed (breakthrough anxiety).  Quantity: 30 tablet  Refills: 0     mirtazapine 15 MG tablet  Commonly known as: REMERON  Used for: Insomnia, unspecified type, Anxiety, Cannabinoid hyperemesis syndrome      Dose: 15 mg  Take 1 tablet (15 mg) by mouth at bedtime.  Quantity: 30 tablet  Refills: 1            CONTINUE these medicines which have NOT CHANGED        Dose / Directions   clindamycin 1 % Swab  Commonly known as: CLEOCIN T      WIPE FACE EVERY  MORNING FOR ACNE  Refills: 0     metoclopramide 10 MG tablet  Commonly known as: REGLAN  Used for: Intractable nausea and vomiting      Dose: 10 mg  Take 1 tablet (10 mg) by mouth every 6 hours as needed for nausea.  Quantity: 30 tablet  Refills: 1     omeprazole 20 MG DR capsule  Commonly known as: PriLOSEC  Used for: Chronic gastritis without bleeding, unspecified gastritis type      Dose: 20 mg  Take 1 capsule (20 mg) by mouth daily.  Quantity: 168 capsule  Refills: 0     ondansetron 4 MG ODT tab  Commonly known as: ZOFRAN ODT  Used for: Intractable nausea and vomiting      Dose: 4 mg  Take 1 tablet (4 mg) by mouth every 6 hours as needed (Nausea and Vomiting).  Quantity: 30 tablet  Refills: 0            STOP taking      traZODone 50 MG tablet  Commonly known as: DESYREL                  Where to get your medicines        These medications were sent to [facility]/pharmacy #[id number] - [facility] [address] [address] [facility]  [address] [address] [address]      Phone: [phone]   ferrous sulfate 325 (65 Fe) MG tablet  ketorolac 10 MG tablet  mirtazapine 15 MG tablet       [place] [facility] [address] [facility]  [address] [facility] [address]      Phone: [phone]   acetaminophen 325 MG tablet  LORazepam 0.5 MG tablet  metoclopramide 10 MG tablet  ondansetron 4 MG ODT tab       Allergies   No Known Allergies

## 2025-07-20 NOTE — PLAN OF CARE
"Patient's After Visit Summary was reviewed with patient and/or friend.   Patient verbalized understanding of After Visit Summary, recommended follow up and was given an opportunity to ask questions.   Discharge medications sent home with patient/family: YES   Discharged with friend, pt is medically ready to be discharged, pt's all belongings sent home with pt, pt's friend transporting the pt to home.      Problem: Adult Inpatient Plan of Care  Goal: Plan of Care Review  Description: The Plan of Care Review/Shift note should be completed every shift.  The Outcome Evaluation is a brief statement about your assessment that the patient is improving, declining, or no change.  This information will be displayed automatically on your shift  note.  7/20/2025 1624 by Yennifer Hogan RN  Outcome: Met  Flowsheets (Taken 7/20/2025 1624)  Plan of Care Reviewed With: patient  Overall Patient Progress: improving  7/20/2025 1437 by Yennifer Hogan RN  Outcome: Progressing  Flowsheets (Taken 7/20/2025 1437)  Plan of Care Reviewed With: patient  Overall Patient Progress: improving  Goal: Patient-Specific Goal (Individualized)  Description: You can add care plan individualizations to a care plan. Examples of Individualization might be:  \"Parent requests to be called daily at 9am for status\", \"I have a hard time hearing out of my right ear\", or \"Do not touch me to wake me up as it startles  me\".  7/20/2025 1624 by Yennifer Hogan RN  Outcome: Met  7/20/2025 1437 by Yennifer Hogan RN  Outcome: Progressing  Goal: Absence of Hospital-Acquired Illness or Injury  7/20/2025 1624 by Yennifer Hogan RN  Outcome: Met  7/20/2025 1437 by Yennifer Hogan RN  Outcome: Progressing  Intervention: Identify and Manage Fall Risk  Recent Flowsheet Documentation  Taken 7/20/2025 1500 by Yennifer Hogan RN  Safety Promotion/Fall Prevention:   clutter free environment maintained   nonskid shoes/slippers when out of bed   " room near nurse's station   safety round/check completed  Taken 7/20/2025 0900 by Yennifer Hogan RN  Safety Promotion/Fall Prevention:   clutter free environment maintained   nonskid shoes/slippers when out of bed   room near nurse's station   safety round/check completed  Intervention: Prevent Skin Injury  Recent Flowsheet Documentation  Taken 7/20/2025 1500 by Yennifer Hogan RN  Body Position: position changed independently  Taken 7/20/2025 0900 by Yennifer Hogan RN  Body Position: position changed independently  Intervention: Prevent and Manage VTE (Venous Thromboembolism) Risk  Recent Flowsheet Documentation  Taken 7/20/2025 1500 by Yennifer Hogan RN  VTE Prevention/Management: SCDs off (sequential compression devices)  Taken 7/20/2025 0900 by Yennifer Hogan RN  VTE Prevention/Management: SCDs off (sequential compression devices)  Goal: Optimal Comfort and Wellbeing  7/20/2025 1624 by Yennifer Hogan RN  Outcome: Met  7/20/2025 1437 by Yennifer Hogan RN  Outcome: Progressing  Goal: Readiness for Transition of Care  7/20/2025 1624 by Yennifer Hogan RN  Outcome: Met  7/20/2025 1437 by Yennifer Hogan RN  Outcome: Progressing     Problem: Nausea and Vomiting  Goal: Nausea and Vomiting Relief  7/20/2025 1624 by Yennifer Hogan RN  Outcome: Met  7/20/2025 1437 by Yennifer Hogan RN  Outcome: Progressing   Goal Outcome Evaluation:      Plan of Care Reviewed With: patient    Overall Patient Progress: improvingOverall Patient Progress: improving

## 2025-07-20 NOTE — PROGRESS NOTES
Cross cover note:    Patient requested medication for heartburn.  States Tums and GI cocktail caused her to vomit previously.  The patient has no IV access.    Rx'd famotidine 10 mg as needed x 1.

## 2025-07-20 NOTE — PLAN OF CARE
Assumed care for pt from [id number].     Pt A&O x4. VSS. Pt reported sudden sharp right sided abd pain, provider notified and US obtained. Pain controlled with IV Toradol and oral tylenol. Reports nausea is better today with oral pantoprazole. No episode of emesis. Tolerating oral intake with ice chips, water, and broth. IV infiltrated removed and stopped fluids. Making needs known with call light.     Pt concerned about possible discharge home if unable to eat solids foods. RN talked with pt in length and provided education of advancing diet as tolerated and continuing oral antiemetics.     Problem: Adult Inpatient Plan of Care  Goal: Plan of Care Review  Description: The Plan of Care Review/Shift note should be completed every shift.  The Outcome Evaluation is a brief statement about your assessment that the patient is improving, declining, or no change.  This information will be displayed automatically on your shift  note.  Outcome: Progressing  Flowsheets (Taken 7/19/2025 2301)  Overall Patient Progress: improving  Goal: Absence of Hospital-Acquired Illness or Injury  Intervention: Identify and Manage Fall Risk  Recent Flowsheet Documentation  Taken 7/19/2025 2205 by Velvet Amaya RN  Safety Promotion/Fall Prevention: safety round/check completed  Intervention: Prevent Skin Injury  Recent Flowsheet Documentation  Taken 7/19/2025 2205 by Velvet Amaya RN  Body Position: position changed independently  Intervention: Prevent and Manage VTE (Venous Thromboembolism) Risk  Recent Flowsheet Documentation  Taken 7/19/2025 2205 by Velvet Amaya RN  VTE Prevention/Management: (pt independent and steady in room) patient refused intervention     Problem: Nausea and Vomiting  Goal: Nausea and Vomiting Relief  Intervention: Prevent and Manage Nausea and Vomiting  Recent Flowsheet Documentation  Taken 7/19/2025 2205 by Velvet Amaya RN  Fluid/Electrolyte Management: electrolyte supplement  initiated  Nausea/Vomiting Interventions:   antiemetic   nausea triggers minimized   sips of clear liquids given  Environmental Support: calm environment promoted

## 2025-07-20 NOTE — PROGRESS NOTES
[facility]    Medicine Progress Note - Hospitalist Service    Date of Admission:  7/17/2025    Assessment & Plan   Hector Mazariegos is a 19 year old female admitted on 7/17/2025. She has a PMH significant for dysmenorrhea and menorrhagia along wtih past admissions for cannabis related hyperemesis.  She comes in with increasing menstrual cramps that started this past Tues. Since then, she's had heavy bleeding and started to have intractable n/v. Her sisters said that it's typical to have strong cramps but never this severe of n/v. Also she has not had THC or cannabis for at lest 2 weeks so she was brought to ED for further eval. She has had multiple CT abd/pelvis studies, as well as a recent EGD. She continues to experience frequent bouts of these symptoms and attributes them to her menstrual period.       Workup in the ED reveals AGMA and hypokalemia. HCG negative. With baseline hgb in the 10's. She was started on IVF and anti-emetics and recommended for admission.     Interval events:  Continues to endorse nausea and vomiting. Loss IV access 7/19/2025.  Still with abdominal pain.     Intractable n/v, possible cannabinoid hyperemesis syndrome  Dysmenorrhea   Right hemorrhagic ovarian cyst. Hx of painful mentral cramps with associated n/v but this seems to have last longer than previous. She was actually seen at the [place] [address] ED for similar complaints earlier in the week and had unremarkable work up.  She had CT abd/pelvis and EGD in Feb 2025 that was reported as negative and a negative H.Pylori testing.   - Admit to OBS for supportive treatment   - CLD and ADAT. Continue IVF for now.  Repeat H.pylor stool antigen given to complete work up.   - Toradol/oxycodone for pain plus antiemetics. Added Capsaicin cream 7/20/2025.  Trial Remeron (antiemetic effect is thought to be due to its antagonism of 5-HT3 and H1 receptors). Dexamethasone 4 mg IV x 1.   - THC cessation counciled   - Seen by GYN  -- Dr. Buitrago.  Will follow up as OP.  No surgical indication.   [facility] [phone].     #Anxiety  #Insomnia  #THC use:  - PRN lorazepam.  Stop Cymbalta and start Remeron 15 mg at bedtime. Stop Trazodone.  - Symptomatic management - hot showers.         #Microcytic anemia:  Iron stores 20 and sat index 5 with normal TIBC.  Hgb 9.7  - Likely due to heavy menstruation.  - Recommend outpatient PO iron at discharge.  Will give Fe sucrose 200 mg IVPB x 1 done.  - Transfuse PRN.     #Hx of provoked PE following breast reduction surgery '23:   - noted. Treated with DOAC.         Diet: Clear Liquid Diet. ADAT.   DVT Prophylaxis: Ambulate every shift  Hackett Catheter: Not present  Lines: None     Cardiac Monitoring: None  Code Status: Full Code      Clinically Significant Risk Factors                                           Social Drivers of Health    Depression: At risk (6/9/2025)    PHQ-2     PHQ-2 Score: 3          Disposition Plan     Medically Ready for Discharge: Anticipated Tomorrow           The patient's care was discussed with the Bedside Nurse, Care Coordinator/, Patient, and Patient's Family.    UZIEL Mars [facility]  Hospitalist Service  [facility]  Securely message with [employer] (more info)  Text page via [employer] Paging/Directory   ______________________________________________________________________    Interval History   As above.    No CP or SOB.  Tolerating clear liquids.  Scared to advance diet.   Highly anxious.       Physical Exam   Vital Signs: Temp: 98.3  F (36.8  C) Temp src: Oral BP: (!) 147/91 Pulse: 60   Resp: 16 SpO2: 97 % O2 Device: None (Room air)    Weight: 110 lbs 14.4 oz    GEN:   Alert, oriented x 3, appears comfortable, NAD.  NECK:   Supple ,no mass or thyromegaly   HEENT:  Normocephalic/atraumatic, no scleral icterus, no nasal discharge, mouth moist.  CV:   Regular rate and rhythm, no murmur or JVD.  S1 + S2 noted, no S3 or S4.  LUNGS:    Clear to auscultation bilaterally without rales/rhonchi/wheezing/retractions.  Symmetric chest rise on inhalation noted.  ABD:   Active bowel sounds, soft, non-tender/non-distended.  No rebound/guarding/rigidity.  EXT:   No edema.  No cyanosis.  No joint synovitis noted.  SKIN:   Dry to touch, no exanthems noted in the visualized areas.  Neurologic: Grossly intact,non focal.   Neuropsychiatric:  General: normal, calm and normal eye contact  Level of consciousness: alert / normal  Affect: normal  Orientation: oriented to self, place, time and situation     Medical Decision Making       45 MINUTES SPENT BY ME on the date of service doing chart review, history, exam, documentation & further activities per the note.      Data   ------------------------- PAST 24 HR DATA REVIEWED -----------------------------------------------    6.9>10.2<414  BMP WNL. Mg 1.8

## 2025-07-22 ENCOUNTER — PATIENT OUTREACH (OUTPATIENT)
Dept: CARE COORDINATION | Facility: CLINIC | Age: 20
End: 2025-07-22
Payer: COMMERCIAL

## 2025-07-22 NOTE — PROGRESS NOTES
[facility] Contact  [facility]/Voicemail     Clinical Data: Post-Discharge Outreach     Outreach attempted x 2.  Left message on patient's voicemail, providing [facility]'s central phone number of [phone] ([phone]) for questions/concerns and/or to schedule an appt with an [facility] provider, if they do not have a PCP.      Plan:  [facility] will do no further outreaches at this time.           LONNIE Arzate  [phone]  [facility]

## 2025-08-06 ENCOUNTER — APPOINTMENT (OUTPATIENT)
Dept: URBAN - METROPOLITAN AREA CLINIC 256 | Age: 20
Setting detail: DERMATOLOGY
End: 2025-08-07

## 2025-08-06 VITALS — WEIGHT: 240 LBS | HEIGHT: 69 IN

## 2025-08-06 DIAGNOSIS — L74.51 PRIMARY FOCAL HYPERHIDROSIS: ICD-10-CM

## 2025-08-06 PROBLEM — L74.519 PRIMARY FOCAL HYPERHIDROSIS, UNSPECIFIED: Status: ACTIVE | Noted: 2025-08-06

## 2025-08-06 PROCEDURE — OTHER MIPS QUALITY: OTHER

## 2025-08-06 PROCEDURE — OTHER PRESCRIPTION: OTHER

## 2025-08-06 PROCEDURE — OTHER PATIENT SPECIFIC COUNSELING: OTHER

## 2025-08-06 PROCEDURE — OTHER PRESCRIPTION MEDICATION MANAGEMENT: OTHER

## 2025-08-06 PROCEDURE — 99214 OFFICE O/P EST MOD 30 MIN: CPT

## 2025-08-06 PROCEDURE — OTHER COUNSELING: OTHER

## 2025-08-06 RX ORDER — GLYCOPYRROLATE 1 MG/1
TABLET ORAL
Qty: 240 | Refills: 0 | Status: ERX | COMMUNITY
Start: 2025-08-06

## (undated) DEVICE — LUBRICATING JELLY 4.25OZ

## (undated) DEVICE — KIT ENDO TURNOVER/PROCEDURE CARRY-ON 101822

## (undated) DEVICE — TUBING SUCTION 12"X1/4" N612

## (undated) DEVICE — ENDO FORCEP ENDOJAW BIOPSY 2.8MMX230CM FB-220U